# Patient Record
Sex: FEMALE | Race: WHITE | Employment: UNEMPLOYED | URBAN - METROPOLITAN AREA
[De-identification: names, ages, dates, MRNs, and addresses within clinical notes are randomized per-mention and may not be internally consistent; named-entity substitution may affect disease eponyms.]

---

## 2017-01-03 ENCOUNTER — HOSPITAL ENCOUNTER (OUTPATIENT)
Dept: PHYSICAL THERAPY | Age: 62
Discharge: HOME | End: 2017-01-03
Payer: MEDICARE

## 2017-01-03 PROCEDURE — 97035 APP MDLTY 1+ULTRASOUND EA 15: CPT

## 2017-01-03 PROCEDURE — 97110 THERAPEUTIC EXERCISES: CPT

## 2017-01-03 NOTE — PROGRESS NOTES
PT DAILY TREATMENT NOTE - [facility] 3-16    Patient Name: Charissa Miguel  Date:1/3/2017  : 1955  [x]  Patient  Verified  Payor: Rosas Scales / Plan: [place] & [place] / Product Type: Medicare /    In time:1:00  Out time:2:04  Total Treatment Time (min):53   Total Timed Codes (min): 43  1:1 Treatment Time ( [address] only): 43  Visit #: 7 of 10    Treatment Area: Other idiopathic scoliosis, site unspecified [M41.20]  Other intervertebral disc degeneration, lumbar region [M51.36]    SUBJECTIVE  Pain Level (0-10 scale): 3  Any medication changes, allergies to medications, adverse drug reactions, diagnosis change, or new procedure performed?: [x] No    [] Yes (see summary sheet for update)  Subjective functional status/changes:   [] No changes reported  Hurts  A little  Bit.     OBJECTIVE    Modality rationale: decrease edema, decrease inflammation, decrease pain and increase tissue extensibility to improve the patients ability to perform ADL   Min Type Additional Details    [] Estim:  []Unatt       []IFC  []Premod                        []Other:  []w/ice   []w/heat  Position:  Location:    [] Estim: []Att    []TENS instruct  []NMES                    []Other:  []w/US   []w/ice   []w/heat  Position:  Location:    []  Traction: [] Cervical       []Lumbar                       [] Prone          []Supine                       []Intermittent   []Continuous Lbs:  [] before manual  [] after manual    [x]  Ultrasound: [x]Continuous   [] Pulsed                8           [x]1MHz   []3MHz Location:(B) LSP  W/cm2:1.3    []  Iontophoresis with dexamethasone         Location: [] Take home patch   [] In clinic   10 []  Ice     [x]  Heat  post  []  Ice massage  []  Laser   []  Anodyne Position:prone  Location:(B) LSP    []  Laser with stim  []  Other: Position:  Location:    []  Vasopneumatic Device Pressure:       [] lo [] med [] hi   Temperature: [] lo [] med [] hi   [x] Skin assessment post-treatment:  [x]intact []redness- no adverse reaction    []redness  adverse reaction:      min []Eval                  []Re-Eval       35 min Therapeutic Exercise:  [x] See flow sheet :   Rationale: increase ROM and increase strength to improve the patients ability to perform ADL     min Therapeutic Activity:  []  See flow sheet :   Rationale:   to improve the patients ability to       min Neuromuscular Re-education:  []  See flow sheet :   Rationale:   to improve the patients ability to      min Manual Therapy:     Rationale: decrease pain, increase ROM, increase tissue extensibility and decrease edema  to perform ADL      min Gait Training:  ___ feet with ___ device on level surfaces with ___ level of assist   Rationale: With   [x] TE   [] TA   [] neuro   [] other: Patient Education: [x] Review HEP    [] Progressed/Changed HEP based on:   [] positioning   [] body mechanics   [] transfers   [] heat/ice application    [] other:      Other Objective/Functional Measures:  Completed  Each there ex  Fairly weli. Pain Level (0-10 scale) post treatment:  0    ASSESSMENT/Changes in Function: Benefited  With  Treatment  Today. Patient will continue to benefit from skilled PT services to address functional mobility deficits, address ROM deficits, address strength deficits, analyze and address soft tissue restrictions, analyze and cue movement patterns and instruct in home and community integration to attain remaining goals. [x]  See Plan of Care  []  See progress note/recertification  []  See Discharge Summary         Progress towards goals / Updated goals:  1 patient will have FOTO improved to 58 to show improved function and tolerance to ADLs  PN 47  CURRENT  41 12/23/16  2 patient will tolerate TM 1/4-1/2 mile and no increase pain to aid with increase tolerance to community ambulation for activities    PN not initiated  CURRENT . 10 miles, TM 5 minutes 12/28/16  3 patient will have tolerance to 3 way hip core strengthening exercises Blue band 10 reps for increase tolerance to ADLS    PN green 35C  CURRENT   Green 15x 1/3/17  4 Patient will have average pain 3/10 to aid with increase tolerance to ADLs and activities  PN 7  CURRENT no number \"hurts a little bit\" 1/3/17    PLAN  []  Upgrade activities as tolerated     [x]  Continue plan of care  []  Update interventions per flow sheet       []  Discharge due to:_  []  Other:_      Heidy Billings, PTA 1/3/2017  1:06 PM

## 2017-01-05 ENCOUNTER — HOSPITAL ENCOUNTER (OUTPATIENT)
Dept: PHYSICAL THERAPY | Age: 62
Discharge: HOME | End: 2017-01-05
Payer: MEDICARE

## 2017-01-05 PROCEDURE — 97110 THERAPEUTIC EXERCISES: CPT

## 2017-01-05 PROCEDURE — 97032 APPL MODALITY 1+ESTIM EA 15: CPT

## 2017-01-05 NOTE — PROGRESS NOTES
PT DAILY TREATMENT NOTE - [facility] 3-16    Patient Name: Pipe Melissa  Date:2017  : 1955  [x]  Patient  Verified  Payor: Luis Elise / Plan: [place] MEDICARE PART A & B / Product Type: Medicare /    In time:12:04  Out time:1:01  Total Treatment Time (min): 48  Total Timed Codes (min): 38  1:1 Treatment Time ( [address] only): 38  Visit #: 8 of 10    Treatment Area: Other idiopathic scoliosis, site unspecified [M41.20]  Other intervertebral disc degeneration, lumbar region [M51.36]    SUBJECTIVE  Pain Level (0-10 scale): 1  Any medication changes, allergies to medications, adverse drug reactions, diagnosis change, or new procedure performed?: [x] No    [] Yes (see summary sheet for update)  Subjective functional status/changes:   [] No changes reported  Little pain.     OBJECTIVE    Modality rationale: decrease edema, decrease inflammation, decrease pain and increase tissue extensibility to improve the patients ability to perform ADL   Min Type Additional Details    [] Estim:  []Unatt       []IFC  []Premod                        []Other:  []w/ice   []w/heat  Position:  Location:    [] Estim: []Att    []TENS instruct  []NMES                    []Other:  []w/US   []w/ice   []w/heat  Position:  Location:    []  Traction: [] Cervical       []Lumbar                       [] Prone          []Supine                       []Intermittent   []Continuous Lbs:  [] before manual  [] after manual    [x]  Ultrasound: [x]Continuous   [] Pulsed                  8         [x]1MHz   []3MHz Location:(B) LSP  W/cm2:1.3    []  Iontophoresis with dexamethasone         Location: [] Take home patch   [] In clinic   10 []  Ice     [x]  Heat post  []  Ice massage  []  Laser   []  Anodyne Position:prone  Location:(B) LSP    []  Laser with stim  []  Other: Position:  Location:    []  Vasopneumatic Device Pressure:       [] lo [] med [] hi   Temperature: [] lo [] med [] hi   [x] Skin assessment post-treatment:  [x]intact []redness- no adverse reaction    []redness  adverse reaction:      min []Eval                  []Re-Eval       30 min Therapeutic Exercise:  [x] See flow sheet :   Rationale: increase ROM and increase strength to improve the patients ability to perform ADL     min Therapeutic Activity:  []  See flow sheet :   Rationale:   to improve the patients ability to       min Neuromuscular Re-education:  []  See flow sheet :   Rationale:   to improve the patients ability to      min Manual Therapy:     Rationale:  to      min Gait Training:  ___ feet with ___ device on level surfaces with ___ level of assist   Rationale: With   [x] TE   [] TA   [] neuro   [] other: Patient Education: [x] Review HEP    [] Progressed/Changed HEP based on:   [] positioning   [] body mechanics   [] transfers   [] heat/ice application    [] other:      Other Objective/Functional Measures:  Increased  Rep per  Flow  sheet    Pain Level (0-10 scale) post treatment: 0    ASSESSMENT/Changes in Function:  Completed  Each there ex  Fairly  Well  With cues. Patient will continue to benefit from skilled PT services to address functional mobility deficits, address ROM deficits, address strength deficits, analyze and address soft tissue restrictions, analyze and cue movement patterns and instruct in home and community integration to attain remaining goals. [x]  See Plan of Care  []  See progress note/recertification  []  See Discharge Summary         Progress towards goals / Updated goals:  1 patient will have FOTO improved to 58 to show improved function and tolerance to ADLs  PN 47  CURRENT  41 12/23/16  2 patient will tolerate TM 1/4-1/2 mile and no increase pain to aid with increase tolerance to community ambulation for activities    PN not initiated  CURRENT . 10 miles, TM 5 minutes 12/28/16  3 patient will have tolerance to 3 way hip core strengthening exercises Blue band 10 reps for increase tolerance to ADLS    PN green 13G  CURRENT   Green 15x 1/3/17  4 Patient will have average pain 3/10 to aid with increase tolerance to ADLs and activities  PN 7  CURRENT no number \"hurts a little bit\" 1/3/17    PLAN  []  Upgrade activities as tolerated     [x]  Continue plan of care  []  Update interventions per flow sheet       []  Discharge due to:_  []  Other:_      Heidy Billings, ZULEYKA 1/5/2017  12:53 PM

## 2017-01-06 DIAGNOSIS — M41.20 OTHER IDIOPATHIC SCOLIOSIS: ICD-10-CM

## 2017-01-06 DIAGNOSIS — M51.36 DDD (DEGENERATIVE DISC DISEASE), LUMBAR: ICD-10-CM

## 2017-01-10 ENCOUNTER — APPOINTMENT (OUTPATIENT)
Dept: PHYSICAL THERAPY | Age: 62
End: 2017-01-10
Payer: MEDICARE

## 2017-01-12 ENCOUNTER — HOSPITAL ENCOUNTER (OUTPATIENT)
Dept: PHYSICAL THERAPY | Age: 62
Discharge: HOME | End: 2017-01-12
Payer: MEDICARE

## 2017-01-12 PROCEDURE — 97530 THERAPEUTIC ACTIVITIES: CPT

## 2017-01-12 PROCEDURE — 97110 THERAPEUTIC EXERCISES: CPT

## 2017-01-12 PROCEDURE — 97035 APP MDLTY 1+ULTRASOUND EA 15: CPT

## 2017-01-12 NOTE — PROGRESS NOTES
PT DAILY TREATMENT NOTE - [facility] 3-16    Patient Name: Bailey Celeste  Date:2017  : 1955  [x]  Patient  Verified  Payor: Annalise Vazquez / Plan: [place] MEDICARE PART A & B / Product Type: Medicare /    In time:2:00  Out time:3:12  Total Treatment Time (min): 63  Total Timed Codes (min): 53  1:1 Treatment Time ( [address] only):  53  Visit #: 9 of 10    Treatment Area:  Other idiopathic scoliosis, site unspecified [M41.20]  Other intervertebral disc degeneration, lumbar region [M51.36]    SUBJECTIVE  Pain Level (0-10 scale): 1  Any medication changes, allergies to medications, adverse drug reactions, diagnosis change, or new procedure performed?: [x] No    [] Yes (see summary sheet for update)  Subjective functional status/changes:   [] No changes reported  Little  Bit  Of  pain    OBJECTIVE    Modality rationale: decrease edema, decrease inflammation, decrease pain and increase tissue extensibility to improve the patients ability to perform ADL   Min Type Additional Details    [] Estim:  []Unatt       []IFC  []Premod                        []Other:  []w/ice   []w/heat  Position:  Location:    [] Estim: []Att    []TENS instruct  []NMES                    []Other:  []w/US   []w/ice   []w/heat  Position:  Location:    []  Traction: [] Cervical       []Lumbar                       [] Prone          []Supine                       []Intermittent   []Continuous Lbs:  [] before manual  [] after manual    [x]  Ultrasound: [x]Continuous   [] Pulsed               8            [x]1MHz   []3MHz Location:(B) LSP  W/cm2:1.3    []  Iontophoresis with dexamethasone         Location: [] Take home patch   [] In clinic   10 []  Ice     [x]  Heat  post  []  Ice massage  []  Laser   []  Anodyne Position:prone  Location:(B) LSP    []  Laser with stim  []  Other: Position:  Location:    []  Vasopneumatic Device Pressure:       [] lo [] med [] hi   Temperature: [] lo [] med [] hi   [x] Skin assessment post-treatment:  [x]intact []redness- no adverse reaction    []redness  adverse reaction:      min []Eval                  []Re-Eval       32 min Therapeutic Exercise:  [x] See flow sheet :   Rationale: increase ROM and increase strength to improve the patients ability to perform ADL    13 min Therapeutic Activity:  []  See flow sheet :   Rationale:   to improve the patients ability to      min Neuromuscular Re-education:  []  See flow sheet :   Rationale:   to improve the patients ability to      min Manual Therapy:     Rationale: decrease pain, increase ROM, increase tissue extensibility and decrease edema  to perform ADL     min Gait Training:  ___ feet with ___ device on level surfaces with ___ level of assist   Rationale: With   [x] TE   [] TA   [] neuro   [] other: Patient Education: [x] Review HEP    [] Progressed/Changed HEP based on:   [] positioning   [] body mechanics   [] transfers   [] heat/ice application    [] other:      Other Objective/Functional Measures: TM  .15  Miles intermittent  Decreased  Heel  strike     Pain Level (0-10 scale) post treatment: 0    ASSESSMENT/Changes in Function: Completed each there ex  Fairly well  With cues. Patient will continue to benefit from skilled PT services to address functional mobility deficits, address ROM deficits, address strength deficits, analyze and address soft tissue restrictions, analyze and cue movement patterns and instruct in home and community integration to attain remaining goals. [x]  See Plan of Care  []  See progress note/recertification  []  See Discharge Summary         Progress towards goals / Updated goals:  1 patient will have FOTO improved to 58 to show improved function and tolerance to ADLs  PN 47  CURRENT  41 12/23/16  2 patient will tolerate TM 1/4-1/2 mile and no increase pain to aid with increase tolerance to community ambulation for activities    PN not initiated  CURRENT . 15 miles, TM  8 minutes 1/12/17  met  3 patient will have tolerance to 3 way hip core strengthening exercises Blue band 10 reps for increase tolerance to ADLS    PN green 09G  CURRENT   Green 15x 1/3/17  4 Patient will have average pain 3/10 to aid with increase tolerance to ADLs and activities  PN 7  CURRENT no number \"hurts a little bit\" 1/3/17    PLAN  []  Upgrade activities as tolerated     [x]  Continue plan of care  []  Update interventions per flow sheet       []  Discharge due to:_  []  Other:_      Heidy Billings, ZULEYKA 1/12/2017  2:32 PM

## 2017-01-17 ENCOUNTER — HOSPITAL ENCOUNTER (OUTPATIENT)
Dept: PHYSICAL THERAPY | Age: 62
Discharge: HOME | End: 2017-01-17
Payer: MEDICARE

## 2017-01-17 PROCEDURE — 97110 THERAPEUTIC EXERCISES: CPT

## 2017-01-17 PROCEDURE — 97035 APP MDLTY 1+ULTRASOUND EA 15: CPT

## 2017-01-17 NOTE — PROGRESS NOTES
PT DAILY TREATMENT NOTE - [facility] 3-16    Patient Name: Thomas Nuñez  Date:2017  : 1955  [x]  Patient  Verified  Payor: Maritza Pierce / Plan: [place] MEDICARE PART A & B / Product Type: Medicare /    In time:1:05  Out time:2:00  Total Treatment Time (min): 44  Total Timed Codes (min): 44  1:1 Treatment Time ( [address] only): 44  Visit #: 10 of 10    Treatment Area: Other idiopathic scoliosis, site unspecified [M41.20]  Other intervertebral disc degeneration, lumbar region [M51.36]    SUBJECTIVE  Pain Level (0-10 scale): 1  Any medication changes, allergies to medications, adverse drug reactions, diagnosis change, or new procedure performed?: [x] No    [] Yes (see summary sheet for update)  Subjective functional status/changes:   [] No changes reported  Little  Pain.     OBJECTIVE    Modality rationale: decrease edema, decrease inflammation, decrease pain and increase tissue extensibility to improve the patients ability to perform ADL   Min Type Additional Details    [] Estim:  []Unatt       []IFC  []Premod                        []Other:  []w/ice   []w/heat  Position:  Location:    [] Estim: []Att    []TENS instruct  []NMES                    []Other:  []w/US   []w/ice   []w/heat  Position:  Location:    []  Traction: [] Cervical       []Lumbar                       [] Prone          []Supine                       []Intermittent   []Continuous Lbs:  [] before manual  [] after manual    [x]  Ultrasound: [x]Continuous   [] Pulsed                8           [x]1MHz   []3MHz Location:(R) LSP  W/cm2:1.3    []  Iontophoresis with dexamethasone         Location: [] Take home patch   [] In clinic    []  Ice     []  heat  []  Ice massage  []  Laser   []  Anodyne Position:  Location:    []  Laser with stim  []  Other: Position:  Location:    []  Vasopneumatic Device Pressure:       [] lo [] med [] hi   Temperature: [] lo [] med [] hi   [x] Skin assessment post-treatment:  [x]intact []redness- no adverse reaction    []redness  adverse reaction:      min []Eval                  []Re-Eval       36 min Therapeutic Exercise:  [x] See flow sheet :   Rationale: increase ROM and increase strength to improve the patients ability to perform ADL     min Therapeutic Activity:  []  See flow sheet :   Rationale:   to improve the patients ability to       min Neuromuscular Re-education:  []  See flow sheet :   Rationale:   to improve the patients ability to      min Manual Therapy:     Rationale: decrease pain, increase ROM, increase tissue extensibility and decrease edema  to perform ADL     min Gait Training:  ___ feet with ___ device on level surfaces with ___ level of assist   Rationale: With   [x] TE   [] TA   [] neuro   [] other: Patient Education: [x] Review HEP    [] Progressed/Changed HEP based on:   [] positioning   [] body mechanics   [] transfers   [] heat/ice application    [x] other: REASSESS GOALS     Other Objective/Functional Measures: FOTO:35    Pain Level (0-10 scale) post treatment: 0    ASSESSMENT/Changes in Function: Ms. Jose Holley  Reports  80%  Improvement. Ambulated  On TM . [address] no difficulty. FOTO has decreased  Slightly but  Reports  She's better. Patient will continue to benefit from skilled PT services to address functional mobility deficits, address ROM deficits, address strength deficits, analyze and address soft tissue restrictions and instruct in home and community integration to attain remaining goals. [x]  See Plan of Care  []  See progress note/recertification  []  See Discharge Summary         Progress towards goals / Updated goals:  1 patient will have FOTO improved to 58 to show improved function and tolerance to ADLs  PN 47  CURRENT 35 1/17/16  2 patient will tolerate TM 1/4-1/2 mile and no increase pain to aid with increase tolerance to community ambulation for activities    PN not initiated  CURRENT . 15 miles, TM 8 minutes 1/12/17   3 patient will have tolerance to 3 way hip core strengthening exercises Blue band 10 reps for increase tolerance to ADLS    PN green 73N  CURRENT   blue 15x 1/17/17  met  4 Patient will have average pain 3/10 to aid with increase tolerance to ADLs and activities  PN 7  CURRENT no number \"hurts a little bit\" 1/17/17 met       PLAN  []  Upgrade activities as tolerated     []  Continue plan of care  []  Update interventions per flow sheet       []  Discharge due to:_  [x]  Other:_ FAX  MD  NOTE     [address] [facility] 1/17/2017  1:07 PM

## 2017-01-18 NOTE — PROGRESS NOTES
[facility]  [address] [facility], [address] [address]  [phone] fax    Medicare Progress Report    Patient name: Charissa Miguel Start of Care: 10/3/16   Referral source: Laura Kee MD : 1955   Medical/Treatment Diagnosis: Other idiopathic scoliosis, site unspecified [M41.20]  Other intervertebral disc degeneration, lumbar region [M51.36] Onset Date:longstanding     Prior Hospitalization: see medical history Provider#: [id number]   Medications: Verified on Patient Summary List    Comorbidities:DM, Thyroid problems, Scoliosis, visual impaired  Prior Level of Function:I all areas of ADLs and activities, no AD, Disabled, enjoys reading , and watching TV     Visits from Start of Care: 17    Missed Visits: 0    Reporting Period:10/3/16 to 17    Subjective Reports: residual reports of LBP varies from right to left. Due to her cognitive issues obtaining an accurate pain scale is sometimes difficult. Current Status/ treatment goals Objective measures   1. patient will have FOTO improved to 58 to show improved function and tolerance to ADLs   [] met                 [x] not met  [] progressing 35    2. patient will tolerate TM 1/4-1/2 mile and no increase pain to aid with increase tolerance to community ambulation    [] met                 [x] not met  [] progressing Not initiated   3. patient will have tolerance to 3 way hip core strengthening exercises Blue band 10 reps for increase tolerance to ADLS     [x] met                 [] not met  [] progressing Blue band 15 repetitions   4. Patient will have average pain 3/10 to aid with increase tolerance to ADLs and activities [x] met                 [] not met  [x] progressing Latest report 1/10 with verbal reports average \"little bit\", \"sore\"     Key functional changes:no significant improvements seen via [employer].  Her score declined from previous 47 to 35                              She tolerates all exercises well. Problems/ barriers to goal attainment: residual C/O LBP that are chronic and longstanding     Assessment / Recommendations:patient appears appropriate to attempt self management at this time. She has had 17 sessions over a 3 month time frame. She tolerates sessions well. With her chronic history and our familiarity with her condition we anticipate intervention in the future and wish to remain aware of insurance cap issues. Please advise . Patient has MD follow up in February. Problem List: pain affecting function, decrease ADL/ functional abilitiies, decrease flexibility/ joint mobility and other LATEST FOTO 35   Treatment Plan: Other: attempt self management    Patient Goal (s) has been updated and includes: continue therapy     Updated Goals to be accomplished in  TBA pending MD response    Frequency / Duration: pending MD response  G-Codes (GP)  Mobility  [id number] Current  CL= 60-79%   Goal  CK= 40-59%  D/C  CL= 60-79%   The severity rating is based on clinical judgment and the FOTO score.         Emerita Felix, PT 1/18/2017 7:26 AM

## 2017-01-19 ENCOUNTER — APPOINTMENT (OUTPATIENT)
Dept: PHYSICAL THERAPY | Age: 62
End: 2017-01-19
Payer: MEDICARE

## 2017-01-25 DIAGNOSIS — M51.36 DDD (DEGENERATIVE DISC DISEASE), LUMBAR: ICD-10-CM

## 2017-01-25 DIAGNOSIS — M41.20 OTHER IDIOPATHIC SCOLIOSIS: ICD-10-CM

## 2017-01-25 RX ORDER — TRAMADOL HYDROCHLORIDE 50 MG/1
TABLET ORAL
Qty: 270 TAB | Refills: 0 | OUTPATIENT
Start: 2017-01-25 | End: 2020-01-01

## 2017-01-25 NOTE — TELEPHONE ENCOUNTER
PHONE IN RX    Last Visit: 09/28/2016 with MD Ortiz Millard    Next Appointment: 03/29/2017 with MD Ortiz Millard   Previous Refill Encounters: 09/28/2016 per MD Ortiz iMllard #[id number]     Requested Prescriptions     Pending Prescriptions Disp Refills    traMADol (ULTRAM) 50 mg tablet 270 Tab 0     Sig: Take 1/2-1 tab po daily-tid prn pain

## 2017-02-09 NOTE — PROGRESS NOTES
[facility]  [address] [facility] [address] [facility], [address] [address]  [phone] fax    Discharge Summary    Patient name: Shaista Morales     Start of Care:10/3/16  Referral source: Donna Navarrete MD    : 1955  Medical/Treatment Diagnosis: Other idiopathic scoliosis, site unspecified [M41.20]  Other intervertebral disc degeneration, lumbar region [M51.36]  Onset Date:longstanding  Prior Hospitalization: see medical history   Provider#: [id number]  Comorbidities: DM, Thyroid problems, Scoliosis, visual impaired  Prior Level of Function:I all areas of ADLs and activities, no AD, Disabled, enjoys reading , and watching TV      Medications: Verified on Patient Summary List    Visits from Start of Care: 17    Missed Visits: 0  Reporting Period : 10/3/16 to 17      Summary of Care:Please see latest MD note for specifics . Patient was to follow up with MD on 2/3/17 and there has been no further contact. She has exercises for a HEP and should continue to remain compliant with them. She should follow up with her MD as needed. Thank you.    Isabel Gregg will have established and be independent with HEP to aid with progression of skilled PT program  Status at last note/certification:eval  Status at discharge: met    Goal:patient will have FOTO improved to 49 to show improved function and tolerance to ADLs  Status at last note/certification:eval  Status at discharge: not met    Goal:patient will tolerate bike 6-8 minutes to aid with improved hip and pelvic mobility and not have any increased painStatus at last note/certification:eval  Status at discharge: met    Goal: patient will have tolerance to 3 way hip core strengthening exercises red band 10 reps for increase tolerance to ADLS   Status at last note/certification:eval  Status at discharge: met    G-Codes (GP)     NO G CODES TO REPORT AFTER NO FURTHER CONTACT FOLLOWING MD VISIT    ASSESSMENT/RECOMMENDATIONS:  []Discontinue therapy progressing towards or have reached established goals  []Discontinue therapy due to lack of appreciable progress towards goals  [x]Discontinue therapy due to lack of attendance or compliance  [x]Other:NO FURTHER CONTACT AFTER MD NOTE SENT/MD FOLLOW UP  Thank you for this referral.     Parisa Oconnell, PT 2/9/2017 12:49 PM

## 2017-04-06 ENCOUNTER — TELEPHONE (OUTPATIENT)
Dept: ORTHOPEDIC SURGERY | Age: 62
End: 2017-04-06

## 2017-06-08 ENCOUNTER — PATIENT OUTREACH (OUTPATIENT)
Dept: INTERNAL MEDICINE CLINIC | Age: 62
End: 2017-06-08

## 2017-06-08 NOTE — PROGRESS NOTES
Nurse Navigator contacted Mrs. Bao Prater, the patients mother to follow up on missed appointment with Dr. Ronny Saucedo today. Mrs. Cline Cargo for missing the appointment and said she was just about to call to reschedule. Appointment rescheduled for 6/28/2017 at 1300, per Dr. Ronny Saucedo, Mrs. Bao Prater agreed. Physical address for office provided to Mrs. Bao Prater.

## 2017-06-10 RX ORDER — LISINOPRIL 20 MG/1
TABLET ORAL
Qty: 90 TAB | Refills: 1 | Status: SHIPPED | OUTPATIENT
Start: 2017-06-10 | End: 2017-12-19 | Stop reason: SDUPTHER

## 2017-06-29 ENCOUNTER — PATIENT OUTREACH (OUTPATIENT)
Dept: INTERNAL MEDICINE CLINIC | Age: 62
End: 2017-06-29

## 2017-06-29 NOTE — PROGRESS NOTES
Nurse Navigator contacted Mrs. Dada Link, the patients mother to follow up on missed appointment with Dr. Nava oRss yesterday. Mrs. Talat Arboleda for missing the appointment and said she got lost and called to reschedule. Appointment rescheduled for 10/11/2017 at 1100. Physical address for office provided to Mrs. Dada Link.

## 2017-10-06 ENCOUNTER — TELEPHONE (OUTPATIENT)
Dept: INTERNAL MEDICINE CLINIC | Age: 62
End: 2017-10-06

## 2017-10-06 DIAGNOSIS — I10 ESSENTIAL HYPERTENSION: Primary | ICD-10-CM

## 2017-10-06 DIAGNOSIS — N18.30 TYPE 2 DIABETES MELLITUS WITH STAGE 3 CHRONIC KIDNEY DISEASE, WITHOUT LONG-TERM CURRENT USE OF INSULIN (HCC): ICD-10-CM

## 2017-10-06 DIAGNOSIS — E11.22 TYPE 2 DIABETES MELLITUS WITH STAGE 3 CHRONIC KIDNEY DISEASE, WITHOUT LONG-TERM CURRENT USE OF INSULIN (HCC): ICD-10-CM

## 2017-10-06 DIAGNOSIS — N18.30 CHRONIC KIDNEY DISEASE, STAGE III (MODERATE) (HCC): ICD-10-CM

## 2017-10-06 DIAGNOSIS — I42.0 DILATED CARDIOMYOPATHY (HCC): ICD-10-CM

## 2017-10-06 DIAGNOSIS — I48.0 PAROXYSMAL ATRIAL FIBRILLATION (HCC): ICD-10-CM

## 2017-10-06 DIAGNOSIS — E03.9 HYPOTHYROIDISM, UNSPECIFIED TYPE: ICD-10-CM

## 2017-10-06 NOTE — TELEPHONE ENCOUNTER
Please schedule lab appointment. Orders placed. Please confirm that she and her mother will come to appointments on Wednesday as has been an issue in the past.    Thanks.

## 2017-10-09 NOTE — TELEPHONE ENCOUNTER
Spoke to Mother. She didn't think they could get labs. She took down information and will give it to daughter.

## 2017-12-14 ENCOUNTER — TELEPHONE (OUTPATIENT)
Dept: INTERNAL MEDICINE CLINIC | Age: 62
End: 2017-12-14

## 2017-12-19 RX ORDER — LISINOPRIL 20 MG/1
20 TABLET ORAL DAILY
Qty: 90 TAB | Refills: 1 | Status: SHIPPED | OUTPATIENT
Start: 2017-12-19 | End: 2020-01-01

## 2017-12-19 NOTE — TELEPHONE ENCOUNTER
Last Visit: 09/06/2016 with MD Adam Whitley    Next Appointment: noted to f/u in 3 months   Previous Refill Encounters: 06/10/2017 per MD Adam Whitley #[id number] with 1 refill     Requested Prescriptions     Pending Prescriptions Disp Refills    lisinopril (PRINIVIL, ZESTRIL) 20 mg tablet 90 Tab 1     Sig: Take 1 Tab by mouth daily.

## 2018-05-29 RX ORDER — LISINOPRIL 20 MG/1
TABLET ORAL
Qty: 90 TAB | Refills: 1 | OUTPATIENT
Start: 2018-05-29

## 2018-05-30 NOTE — TELEPHONE ENCOUNTER
Called mother. Says patient has not been to doctor. Mother has been sick and hasn't been able to take her. She said if we schedule appt she will try and get her here to at least refill the medication.  Appt scheduled with Pasquale Salinas
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
## 2020-01-01 ENCOUNTER — APPOINTMENT (OUTPATIENT)
Dept: CT IMAGING | Age: 65
DRG: 291 | End: 2020-01-01
Attending: EMERGENCY MEDICINE
Payer: MEDICARE

## 2020-01-01 ENCOUNTER — APPOINTMENT (OUTPATIENT)
Dept: GENERAL RADIOLOGY | Age: 65
DRG: 208 | End: 2020-01-01
Attending: STUDENT IN AN ORGANIZED HEALTH CARE EDUCATION/TRAINING PROGRAM
Payer: MEDICARE

## 2020-01-01 ENCOUNTER — HOSPITAL ENCOUNTER (OUTPATIENT)
Dept: CT IMAGING | Age: 65
Discharge: HOME | DRG: 208 | End: 2020-10-20
Attending: EMERGENCY MEDICINE
Payer: MEDICARE

## 2020-01-01 ENCOUNTER — HOSPITAL ENCOUNTER (OUTPATIENT)
Dept: LAB | Age: 65
Discharge: HOME | End: 2020-09-29

## 2020-01-01 ENCOUNTER — APPOINTMENT (OUTPATIENT)
Dept: GENERAL RADIOLOGY | Age: 65
DRG: 208 | End: 2020-01-01
Attending: EMERGENCY MEDICINE
Payer: MEDICARE

## 2020-01-01 ENCOUNTER — OFFICE VISIT (OUTPATIENT)
Dept: CARDIOLOGY CLINIC | Age: 65
End: 2020-01-01

## 2020-01-01 ENCOUNTER — APPOINTMENT (OUTPATIENT)
Dept: GENERAL RADIOLOGY | Age: 65
End: 2020-01-01
Attending: EMERGENCY MEDICINE
Payer: MEDICARE

## 2020-01-01 ENCOUNTER — APPOINTMENT (OUTPATIENT)
Dept: VASCULAR SURGERY | Age: 65
DRG: 291 | End: 2020-01-01
Attending: EMERGENCY MEDICINE
Payer: MEDICARE

## 2020-01-01 ENCOUNTER — HOSPITAL ENCOUNTER (INPATIENT)
Age: 65
LOS: 4 days | Discharge: NURSING, REHAB OR LONG-TERM CARE | DRG: 291 | End: 2020-08-29
Attending: EMERGENCY MEDICINE | Admitting: EMERGENCY MEDICINE
Payer: MEDICARE

## 2020-01-01 ENCOUNTER — APPOINTMENT (OUTPATIENT)
Dept: GENERAL RADIOLOGY | Age: 65
DRG: 308 | End: 2020-01-01
Attending: EMERGENCY MEDICINE
Payer: MEDICARE

## 2020-01-01 ENCOUNTER — HOSPITAL ENCOUNTER (INPATIENT)
Age: 65
LOS: 2 days | DRG: 208 | End: 2020-10-22
Attending: EMERGENCY MEDICINE | Admitting: INTERNAL MEDICINE
Payer: MEDICARE

## 2020-01-01 ENCOUNTER — HOSPITAL ENCOUNTER (EMERGENCY)
Age: 65
Discharge: HOME | End: 2020-10-07
Attending: EMERGENCY MEDICINE | Admitting: EMERGENCY MEDICINE
Payer: MEDICARE

## 2020-01-01 ENCOUNTER — PATIENT OUTREACH (OUTPATIENT)
Dept: CASE MANAGEMENT | Age: 65
End: 2020-01-01

## 2020-01-01 ENCOUNTER — HOSPITAL ENCOUNTER (OUTPATIENT)
Dept: LAB | Age: 65
Discharge: HOME | End: 2020-08-31

## 2020-01-01 ENCOUNTER — ANESTHESIA (OUTPATIENT)
Dept: EMERGENCY DEPT | Age: 65
DRG: 291 | End: 2020-01-01
Payer: MEDICARE

## 2020-01-01 ENCOUNTER — APPOINTMENT (OUTPATIENT)
Dept: ULTRASOUND IMAGING | Age: 65
DRG: 208 | End: 2020-01-01
Attending: STUDENT IN AN ORGANIZED HEALTH CARE EDUCATION/TRAINING PROGRAM
Payer: MEDICARE

## 2020-01-01 ENCOUNTER — APPOINTMENT (OUTPATIENT)
Dept: CT IMAGING | Age: 65
DRG: 308 | End: 2020-01-01
Attending: PSYCHIATRY & NEUROLOGY
Payer: MEDICARE

## 2020-01-01 ENCOUNTER — APPOINTMENT (OUTPATIENT)
Dept: VASCULAR SURGERY | Age: 65
DRG: 308 | End: 2020-01-01
Attending: INTERNAL MEDICINE
Payer: MEDICARE

## 2020-01-01 ENCOUNTER — ANESTHESIA EVENT (OUTPATIENT)
Dept: EMERGENCY DEPT | Age: 65
DRG: 291 | End: 2020-01-01
Payer: MEDICARE

## 2020-01-01 ENCOUNTER — APPOINTMENT (OUTPATIENT)
Dept: CT IMAGING | Age: 65
DRG: 308 | End: 2020-01-01
Attending: EMERGENCY MEDICINE
Payer: MEDICARE

## 2020-01-01 ENCOUNTER — APPOINTMENT (OUTPATIENT)
Dept: NON INVASIVE DIAGNOSTICS | Age: 65
DRG: 308 | End: 2020-01-01
Attending: INTERNAL MEDICINE
Payer: MEDICARE

## 2020-01-01 ENCOUNTER — APPOINTMENT (OUTPATIENT)
Dept: NON INVASIVE DIAGNOSTICS | Age: 65
DRG: 208 | End: 2020-01-01
Attending: NURSE PRACTITIONER
Payer: MEDICARE

## 2020-01-01 ENCOUNTER — APPOINTMENT (OUTPATIENT)
Dept: GENERAL RADIOLOGY | Age: 65
DRG: 291 | End: 2020-01-01
Attending: EMERGENCY MEDICINE
Payer: MEDICARE

## 2020-01-01 ENCOUNTER — HOSPITAL ENCOUNTER (INPATIENT)
Age: 65
LOS: 12 days | Discharge: NURSING, REHAB OR LONG-TERM CARE | DRG: 308 | End: 2020-07-20
Attending: EMERGENCY MEDICINE | Admitting: INTERNAL MEDICINE
Payer: MEDICARE

## 2020-01-01 VITALS
DIASTOLIC BLOOD PRESSURE: 56 MMHG | WEIGHT: 130.73 LBS | OXYGEN SATURATION: 94 % | HEIGHT: 60 IN | HEART RATE: 94 BPM | SYSTOLIC BLOOD PRESSURE: 112 MMHG | TEMPERATURE: 98.2 F | RESPIRATION RATE: 20 BRPM | BODY MASS INDEX: 25.67 KG/M2

## 2020-01-01 VITALS
BODY MASS INDEX: 25.09 KG/M2 | RESPIRATION RATE: 20 BRPM | HEART RATE: 78 BPM | SYSTOLIC BLOOD PRESSURE: 137 MMHG | DIASTOLIC BLOOD PRESSURE: 82 MMHG | TEMPERATURE: 97.4 F | OXYGEN SATURATION: 96 % | HEIGHT: 63 IN | WEIGHT: 141.6 LBS

## 2020-01-01 VITALS
HEART RATE: 142 BPM | DIASTOLIC BLOOD PRESSURE: 99 MMHG | SYSTOLIC BLOOD PRESSURE: 114 MMHG | OXYGEN SATURATION: 100 % | BODY MASS INDEX: 24.99 KG/M2 | WEIGHT: 150 LBS | HEIGHT: 65 IN | RESPIRATION RATE: 18 BRPM | TEMPERATURE: 97.8 F

## 2020-01-01 VITALS — HEART RATE: 92 BPM | OXYGEN SATURATION: 92 % | DIASTOLIC BLOOD PRESSURE: 85 MMHG | SYSTOLIC BLOOD PRESSURE: 130 MMHG

## 2020-01-01 VITALS
RESPIRATION RATE: 19 BRPM | OXYGEN SATURATION: 97 % | SYSTOLIC BLOOD PRESSURE: 121 MMHG | TEMPERATURE: 97.8 F | HEART RATE: 93 BPM | DIASTOLIC BLOOD PRESSURE: 79 MMHG

## 2020-01-01 DIAGNOSIS — I50.43 ACUTE ON CHRONIC COMBINED SYSTOLIC AND DIASTOLIC ACC/AHA STAGE C CONGESTIVE HEART FAILURE (HCC): ICD-10-CM

## 2020-01-01 DIAGNOSIS — N17.9 AKI (ACUTE KIDNEY INJURY) (HCC): ICD-10-CM

## 2020-01-01 DIAGNOSIS — I50.23 ACUTE ON CHRONIC SYSTOLIC CONGESTIVE HEART FAILURE (HCC): Primary | ICD-10-CM

## 2020-01-01 DIAGNOSIS — J96.01 ACUTE RESPIRATORY FAILURE WITH HYPOXIA (HCC): ICD-10-CM

## 2020-01-01 DIAGNOSIS — I63.9 CEREBROVASCULAR ACCIDENT (CVA), UNSPECIFIED MECHANISM (HCC): ICD-10-CM

## 2020-01-01 DIAGNOSIS — I50.9 ACUTE ON CHRONIC CONGESTIVE HEART FAILURE, UNSPECIFIED HEART FAILURE TYPE (HCC): ICD-10-CM

## 2020-01-01 DIAGNOSIS — J18.9 COMMUNITY ACQUIRED PNEUMONIA, UNSPECIFIED LATERALITY: Primary | ICD-10-CM

## 2020-01-01 DIAGNOSIS — I50.82 BIVENTRICULAR CONGESTIVE HEART FAILURE (HCC): ICD-10-CM

## 2020-01-01 DIAGNOSIS — F79 INTELLECTUAL DISABILITY: ICD-10-CM

## 2020-01-01 DIAGNOSIS — Z71.89 GOALS OF CARE, COUNSELING/DISCUSSION: ICD-10-CM

## 2020-01-01 DIAGNOSIS — H10.33 ACUTE BACTERIAL CONJUNCTIVITIS OF BOTH EYES: ICD-10-CM

## 2020-01-01 DIAGNOSIS — I48.0 PAROXYSMAL ATRIAL FIBRILLATION (HCC): ICD-10-CM
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
## 2020-01-01 DIAGNOSIS — I42.0 DILATED CARDIOMYOPATHY (HCC): ICD-10-CM

## 2020-01-01 DIAGNOSIS — R53.81 DEBILITY: ICD-10-CM

## 2020-01-01 DIAGNOSIS — I48.0 PAROXYSMAL ATRIAL FIBRILLATION WITH RVR (HCC): Primary | ICD-10-CM

## 2020-01-01 DIAGNOSIS — I10 ESSENTIAL HYPERTENSION: ICD-10-CM

## 2020-01-01 DIAGNOSIS — N18.30 CHRONIC KIDNEY DISEASE, STAGE III (MODERATE) (HCC): ICD-10-CM

## 2020-01-01 DIAGNOSIS — I48.0 PAROXYSMAL ATRIAL FIBRILLATION (HCC): Primary | ICD-10-CM

## 2020-01-01 LAB
25(OH)D3 SERPL-MCNC: 22.7 NG/ML (ref 30–100)
ALBUMIN SERPL-MCNC: 2.1 G/DL (ref 3.4–5)
ALBUMIN SERPL-MCNC: 2.3 G/DL (ref 3.4–5)
ALBUMIN SERPL-MCNC: 2.5 G/DL (ref 3.4–5)
ALBUMIN SERPL-MCNC: 2.5 G/DL (ref 3.4–5)
ALBUMIN SERPL-MCNC: 2.6 G/DL (ref 3.4–5)
ALBUMIN SERPL-MCNC: 2.8 G/DL (ref 3.4–5)
ALBUMIN/GLOB SERPL: 0.7 {RATIO} (ref 0.8–1.7)
ALBUMIN/GLOB SERPL: 0.7 {RATIO} (ref 0.8–1.7)
ALBUMIN/GLOB SERPL: 0.8 {RATIO} (ref 0.8–1.7)
ALP SERPL-CCNC: 115 U/L (ref 45–117)
ALP SERPL-CCNC: 138 U/L (ref 45–117)
ALP SERPL-CCNC: 148 U/L (ref 45–117)
ALP SERPL-CCNC: 172 U/L (ref 45–117)
ALP SERPL-CCNC: 223 U/L (ref 45–117)
ALP SERPL-CCNC: 90 U/L (ref 45–117)
ALT SERPL-CCNC: 112 U/L (ref 13–56)
ALT SERPL-CCNC: 149 U/L (ref 13–56)
ALT SERPL-CCNC: 208 U/L (ref 13–56)
ALT SERPL-CCNC: 38 U/L (ref 13–56)
ALT SERPL-CCNC: 42 U/L (ref 13–56)
ALT SERPL-CCNC: 52 U/L (ref 13–56)
AMMONIA PLAS-SCNC: 60 UMOL/L (ref 11–32)
ANION GAP SERPL CALC-SCNC: 10 MMOL/L (ref 3–18)
ANION GAP SERPL CALC-SCNC: 11 MMOL/L (ref 3–18)
ANION GAP SERPL CALC-SCNC: 3 MMOL/L (ref 3–18)
ANION GAP SERPL CALC-SCNC: 3 MMOL/L (ref 3–18)
ANION GAP SERPL CALC-SCNC: 4 MMOL/L (ref 3–18)
ANION GAP SERPL CALC-SCNC: 5 MMOL/L (ref 3–18)
ANION GAP SERPL CALC-SCNC: 6 MMOL/L (ref 3–18)
ANION GAP SERPL CALC-SCNC: 6 MMOL/L (ref 3–18)
ANION GAP SERPL CALC-SCNC: 7 MMOL/L (ref 3–18)
ANION GAP SERPL CALC-SCNC: 7 MMOL/L (ref 3–18)
ANION GAP SERPL CALC-SCNC: 8 MMOL/L (ref 3–18)
ANION GAP SERPL CALC-SCNC: 9 MMOL/L (ref 3–18)
APAP SERPL-MCNC: 7 UG/ML (ref 10–30)
APPEARANCE UR: CLEAR
APPEARANCE UR: CLEAR
APTT PPP: 146.4 SEC (ref 23–36.4)
APTT PPP: 170.7 SEC (ref 23–36.4)
APTT PPP: 29.1 SEC (ref 23–36.4)
APTT PPP: 31 SEC (ref 23–36.4)
APTT PPP: 31.8 SEC (ref 23–36.4)
APTT PPP: 32.7 SEC (ref 23–36.4)
APTT PPP: 76.7 SEC (ref 23–36.4)
APTT PPP: 80.2 SEC (ref 23–36.4)
APTT PPP: 82 SEC (ref 23–36.4)
APTT PPP: 88.4 SEC (ref 23–36.4)
APTT PPP: >180 SEC (ref 23–36.4)
ARTERIAL PATENCY WRIST A: ABNORMAL
ARTERIAL PATENCY WRIST A: NO
ARTERIAL PATENCY WRIST A: NO
ARTERIAL PATENCY WRIST A: YES
ARTERIAL PATENCY WRIST A: YES
AST SERPL-CCNC: 110 U/L (ref 10–38)
AST SERPL-CCNC: 22 U/L (ref 10–38)
AST SERPL-CCNC: 263 U/L (ref 10–38)
AST SERPL-CCNC: 35 U/L (ref 10–38)
AST SERPL-CCNC: 49 U/L (ref 10–38)
AST SERPL-CCNC: 55 U/L (ref 10–38)
ATRIAL RATE: 104 BPM
ATRIAL RATE: 144 BPM
ATRIAL RATE: 147 BPM
ATRIAL RATE: 187 BPM
ATRIAL RATE: 258 BPM
ATRIAL RATE: 81 BPM
BACTERIA SPEC CULT: NORMAL
BACTERIA SPEC CULT: NORMAL
BACTERIA URNS QL MICRO: ABNORMAL /HPF
BASE DEFICIT BLD-SCNC: 2 MMOL/L
BASE EXCESS BLD CALC-SCNC: 2 MMOL/L
BASE EXCESS BLD CALC-SCNC: 4 MMOL/L
BASE EXCESS BLD CALC-SCNC: 6 MMOL/L
BASE EXCESS BLD CALC-SCNC: 7 MMOL/L
BASOPHILS # BLD: 0 K/UL (ref 0–0.1)
BASOPHILS NFR BLD: 0 % (ref 0–2)
BDY SITE: ABNORMAL
BILIRUB SERPL-MCNC: 0.3 MG/DL (ref 0.2–1)
BILIRUB SERPL-MCNC: 0.3 MG/DL (ref 0.2–1)
BILIRUB SERPL-MCNC: 0.6 MG/DL (ref 0.2–1)
BILIRUB SERPL-MCNC: 0.6 MG/DL (ref 0.2–1)
BILIRUB SERPL-MCNC: 0.7 MG/DL (ref 0.2–1)
BILIRUB SERPL-MCNC: 0.8 MG/DL (ref 0.2–1)
BILIRUB UR QL: NEGATIVE
BILIRUB UR QL: NEGATIVE
BNP SERPL-MCNC: ABNORMAL PG/ML (ref 0–900)
BODY TEMPERATURE: 36.6
BODY TEMPERATURE: 36.9
BODY TEMPERATURE: 37
BUN SERPL-MCNC: 27 MG/DL (ref 7–18)
BUN SERPL-MCNC: 28 MG/DL (ref 7–18)
BUN SERPL-MCNC: 29 MG/DL (ref 7–18)
BUN SERPL-MCNC: 29 MG/DL (ref 7–18)
BUN SERPL-MCNC: 31 MG/DL (ref 7–18)
BUN SERPL-MCNC: 35 MG/DL (ref 7–18)
BUN SERPL-MCNC: 39 MG/DL (ref 7–18)
BUN SERPL-MCNC: 41 MG/DL (ref 7–18)
BUN SERPL-MCNC: 46 MG/DL (ref 7–18)
BUN SERPL-MCNC: 47 MG/DL (ref 7–18)
BUN SERPL-MCNC: 53 MG/DL (ref 7–18)
BUN SERPL-MCNC: 53 MG/DL (ref 7–18)
BUN SERPL-MCNC: 54 MG/DL (ref 7–18)
BUN SERPL-MCNC: 59 MG/DL (ref 7–18)
BUN SERPL-MCNC: 66 MG/DL (ref 7–18)
BUN SERPL-MCNC: 78 MG/DL (ref 7–18)
BUN SERPL-MCNC: 80 MG/DL (ref 7–18)
BUN/CREAT SERPL: 21 (ref 12–20)
BUN/CREAT SERPL: 22 (ref 12–20)
BUN/CREAT SERPL: 25 (ref 12–20)
BUN/CREAT SERPL: 26 (ref 12–20)
BUN/CREAT SERPL: 28 (ref 12–20)
BUN/CREAT SERPL: 30 (ref 12–20)
BUN/CREAT SERPL: 31 (ref 12–20)
BUN/CREAT SERPL: 33 (ref 12–20)
BUN/CREAT SERPL: 36 (ref 12–20)
BUN/CREAT SERPL: 38 (ref 12–20)
BUN/CREAT SERPL: 40 (ref 12–20)
BUN/CREAT SERPL: 43 (ref 12–20)
C TRACH RRNA SPEC QL NAA+PROBE: NEGATIVE
CALCIUM SERPL-MCNC: 7.2 MG/DL (ref 8.5–10.1)
CALCIUM SERPL-MCNC: 8 MG/DL (ref 8.5–10.1)
CALCIUM SERPL-MCNC: 8.2 MG/DL (ref 8.5–10.1)
CALCIUM SERPL-MCNC: 8.2 MG/DL (ref 8.5–10.1)
CALCIUM SERPL-MCNC: 8.5 MG/DL (ref 8.5–10.1)
CALCIUM SERPL-MCNC: 8.5 MG/DL (ref 8.5–10.1)
CALCIUM SERPL-MCNC: 8.7 MG/DL (ref 8.5–10.1)
CALCIUM SERPL-MCNC: 8.8 MG/DL (ref 8.5–10.1)
CALCIUM SERPL-MCNC: 8.9 MG/DL (ref 8.5–10.1)
CALCIUM SERPL-MCNC: 8.9 MG/DL (ref 8.5–10.1)
CALCIUM SERPL-MCNC: 9 MG/DL (ref 8.5–10.1)
CALCIUM SERPL-MCNC: 9 MG/DL (ref 8.5–10.1)
CALCIUM SERPL-MCNC: 9.3 MG/DL (ref 8.5–10.1)
CALCIUM SERPL-MCNC: 9.4 MG/DL (ref 8.5–10.1)
CALCULATED R AXIS, ECG10: -20 DEGREES
CALCULATED R AXIS, ECG10: -21 DEGREES
CALCULATED R AXIS, ECG10: -26 DEGREES
CALCULATED R AXIS, ECG10: -49 DEGREES
CALCULATED R AXIS, ECG10: -76 DEGREES
CALCULATED R AXIS, ECG10: -8 DEGREES
CALCULATED T AXIS, ECG11: -100 DEGREES
CALCULATED T AXIS, ECG11: -102 DEGREES
CALCULATED T AXIS, ECG11: -125 DEGREES
CALCULATED T AXIS, ECG11: -126 DEGREES
CALCULATED T AXIS, ECG11: -129 DEGREES
CALCULATED T AXIS, ECG11: 101 DEGREES
CHLORIDE SERPL-SCNC: 102 MMOL/L (ref 100–111)
CHLORIDE SERPL-SCNC: 103 MMOL/L (ref 100–111)
CHLORIDE SERPL-SCNC: 104 MMOL/L (ref 100–111)
CHLORIDE SERPL-SCNC: 105 MMOL/L (ref 100–111)
CHLORIDE SERPL-SCNC: 106 MMOL/L (ref 100–111)
CHLORIDE SERPL-SCNC: 107 MMOL/L (ref 100–111)
CHLORIDE SERPL-SCNC: 107 MMOL/L (ref 100–111)
CHLORIDE SERPL-SCNC: 109 MMOL/L (ref 100–111)
CHLORIDE SERPL-SCNC: 110 MMOL/L (ref 100–111)
CHLORIDE SERPL-SCNC: 110 MMOL/L (ref 100–111)
CHLORIDE SERPL-SCNC: 112 MMOL/L (ref 100–111)
CHLORIDE SERPL-SCNC: 113 MMOL/L (ref 100–111)
CHLORIDE SERPL-SCNC: 114 MMOL/L (ref 100–111)
CHLORIDE SERPL-SCNC: 115 MMOL/L (ref 100–111)
CHOLEST SERPL-MCNC: 145 MG/DL
CK MB CFR SERPL CALC: 3.3 % (ref 0–4)
CK MB CFR SERPL CALC: 4.3 % (ref 0–4)
CK MB CFR SERPL CALC: 4.4 % (ref 0–4)
CK MB CFR SERPL CALC: 5.2 % (ref 0–4)
CK MB CFR SERPL CALC: 5.5 % (ref 0–4)
CK MB CFR SERPL CALC: 6.3 % (ref 0–4)
CK MB CFR SERPL CALC: 6.7 % (ref 0–4)
CK MB CFR SERPL CALC: ABNORMAL % (ref 0–4)
CK MB SERPL-MCNC: 1.5 NG/ML (ref 5–25)
CK MB SERPL-MCNC: 1.7 NG/ML (ref 5–25)
CK MB SERPL-MCNC: 1.8 NG/ML (ref 5–25)
CK MB SERPL-MCNC: 17.9 NG/ML (ref 5–25)
CK MB SERPL-MCNC: 2.3 NG/ML (ref 5–25)
CK MB SERPL-MCNC: 2.7 NG/ML (ref 5–25)
CK MB SERPL-MCNC: 2.9 NG/ML (ref 5–25)
CK MB SERPL-MCNC: 3 NG/ML (ref 5–25)
CK SERPL-CCNC: 29 U/L (ref 26–192)
CK SERPL-CCNC: 41 U/L (ref 26–192)
CK SERPL-CCNC: 42 U/L (ref 26–192)
CK SERPL-CCNC: 43 U/L (ref 26–192)
CK SERPL-CCNC: 43 U/L (ref 26–192)
CK SERPL-CCNC: 52 U/L (ref 26–192)
CK SERPL-CCNC: 566 U/L (ref 26–192)
CK SERPL-CCNC: 70 U/L (ref 26–192)
CK SERPL-CCNC: <7 U/L (ref 26–192)
CO2 SERPL-SCNC: 21 MMOL/L (ref 21–32)
CO2 SERPL-SCNC: 24 MMOL/L (ref 21–32)
CO2 SERPL-SCNC: 25 MMOL/L (ref 21–32)
CO2 SERPL-SCNC: 26 MMOL/L (ref 21–32)
CO2 SERPL-SCNC: 26 MMOL/L (ref 21–32)
CO2 SERPL-SCNC: 28 MMOL/L (ref 21–32)
CO2 SERPL-SCNC: 29 MMOL/L (ref 21–32)
CO2 SERPL-SCNC: 29 MMOL/L (ref 21–32)
CO2 SERPL-SCNC: 30 MMOL/L (ref 21–32)
CO2 SERPL-SCNC: 31 MMOL/L (ref 21–32)
CO2 SERPL-SCNC: 31 MMOL/L (ref 21–32)
CO2 SERPL-SCNC: 33 MMOL/L (ref 21–32)
CO2 SERPL-SCNC: 34 MMOL/L (ref 21–32)
CO2 SERPL-SCNC: 35 MMOL/L (ref 21–32)
COHGB MFR BLD: 2.8 % (ref 0–3.6)
COLOR UR: YELLOW
COLOR UR: YELLOW
CREAT SERPL-MCNC: 1.04 MG/DL (ref 0.6–1.3)
CREAT SERPL-MCNC: 1.3 MG/DL (ref 0.6–1.3)
CREAT SERPL-MCNC: 1.31 MG/DL (ref 0.6–1.3)
CREAT SERPL-MCNC: 1.31 MG/DL (ref 0.6–1.3)
CREAT SERPL-MCNC: 1.33 MG/DL (ref 0.6–1.3)
CREAT SERPL-MCNC: 1.36 MG/DL (ref 0.6–1.3)
CREAT SERPL-MCNC: 1.44 MG/DL (ref 0.6–1.3)
CREAT SERPL-MCNC: 1.53 MG/DL (ref 0.6–1.3)
CREAT SERPL-MCNC: 1.58 MG/DL (ref 0.6–1.3)
CREAT SERPL-MCNC: 1.6 MG/DL (ref 0.6–1.3)
CREAT SERPL-MCNC: 1.63 MG/DL (ref 0.6–1.3)
CREAT SERPL-MCNC: 1.72 MG/DL (ref 0.6–1.3)
CREAT SERPL-MCNC: 1.76 MG/DL (ref 0.6–1.3)
CREAT SERPL-MCNC: 1.81 MG/DL (ref 0.6–1.3)
CREAT SERPL-MCNC: 1.88 MG/DL (ref 0.6–1.3)
CREAT SERPL-MCNC: 1.94 MG/DL (ref 0.6–1.3)
CREAT SERPL-MCNC: 1.94 MG/DL (ref 0.6–1.3)
DIAGNOSIS, 93000: NORMAL
DIFFERENTIAL METHOD BLD: ABNORMAL
ECHO AO ROOT DIAM: 2.97 CM
ECHO LA AREA 4C: 20.9 CM2
ECHO LA VOL 2C: 80.05 ML (ref 22–52)
ECHO LA VOL 4C: 56.88 ML (ref 22–52)
ECHO LA VOL BP: 73.77 ML (ref 22–52)
ECHO LA VOL/BSA BIPLANE: 51.15 ML/M2 (ref 16–28)
ECHO LA VOLUME INDEX A2C: 55.51 ML/M2 (ref 16–28)
ECHO LA VOLUME INDEX A4C: 39.44 ML/M2 (ref 16–28)
ECHO LV EDV TEICHHOLZ: 0.75 ML
ECHO LV ESV TEICHHOLZ: 0.56 ML
ECHO LV INTERNAL DIMENSION DIASTOLIC: 4.54 CM (ref 3.9–5.3)
ECHO LV INTERNAL DIMENSION DIASTOLIC: 4.93 CM (ref 3.9–5.3)
ECHO LV INTERNAL DIMENSION SYSTOLIC: 4.35 CM
ECHO LV INTERNAL DIMENSION SYSTOLIC: 4.39 CM
ECHO LV IVSD: 1.2 CM (ref 0.6–0.9)
ECHO LV IVSD: 1.53 CM (ref 0.6–0.9)
ECHO LV MASS 2D: 224.6 G (ref 67–162)
ECHO LV MASS 2D: 237.2 G (ref 67–162)
ECHO LV MASS INDEX 2D: 135.5 G/M2 (ref 43–95)
ECHO LV MASS INDEX 2D: 155.7 G/M2 (ref 43–95)
ECHO LV POSTERIOR WALL DIASTOLIC: 1.16 CM (ref 0.6–0.9)
ECHO LV POSTERIOR WALL DIASTOLIC: 1.17 CM (ref 0.6–0.9)
ECHO LVOT DIAM: 1.89 CM
ECHO LVOT PEAK GRADIENT: 2.2 MMHG
ECHO LVOT PEAK VELOCITY: 73.91 CM/S
ECHO LVOT SV: 35.7 ML
ECHO LVOT VTI: 12.73 CM
ECHO TV REGURGITANT MAX VELOCITY: 277.11 CM/S
ECHO TV REGURGITANT PEAK GRADIENT: 30.7 MMHG
EOSINOPHIL # BLD: 0 K/UL (ref 0–0.4)
EOSINOPHIL # BLD: 0 K/UL (ref 0–0.4)
EOSINOPHIL # BLD: 0.1 K/UL (ref 0–0.4)
EOSINOPHIL # BLD: 0.2 K/UL (ref 0–0.4)
EOSINOPHIL # BLD: 0.2 K/UL (ref 0–0.4)
EOSINOPHIL NFR BLD: 0 % (ref 0–5)
EOSINOPHIL NFR BLD: 0 % (ref 0–5)
EOSINOPHIL NFR BLD: 1 % (ref 0–5)
EOSINOPHIL NFR BLD: 2 % (ref 0–5)
EOSINOPHIL NFR BLD: 2 % (ref 0–5)
EOSINOPHIL NFR BLD: 3 % (ref 0–5)
EPITH CASTS URNS QL MICRO: ABNORMAL /LPF (ref 0–5)
ERYTHROCYTE [DISTWIDTH] IN BLOOD BY AUTOMATED COUNT: 14.1 % (ref 11.6–14.5)
ERYTHROCYTE [DISTWIDTH] IN BLOOD BY AUTOMATED COUNT: 14.1 % (ref 11.6–14.5)
ERYTHROCYTE [DISTWIDTH] IN BLOOD BY AUTOMATED COUNT: 14.2 % (ref 11.6–14.5)
ERYTHROCYTE [DISTWIDTH] IN BLOOD BY AUTOMATED COUNT: 14.3 % (ref 11.6–14.5)
ERYTHROCYTE [DISTWIDTH] IN BLOOD BY AUTOMATED COUNT: 14.3 % (ref 11.6–14.5)
ERYTHROCYTE [DISTWIDTH] IN BLOOD BY AUTOMATED COUNT: 14.5 % (ref 11.6–14.5)
ERYTHROCYTE [DISTWIDTH] IN BLOOD BY AUTOMATED COUNT: 15.6 % (ref 11.6–14.5)
ERYTHROCYTE [DISTWIDTH] IN BLOOD BY AUTOMATED COUNT: 15.7 % (ref 11.6–14.5)
ERYTHROCYTE [DISTWIDTH] IN BLOOD BY AUTOMATED COUNT: 15.8 % (ref 11.6–14.5)
ERYTHROCYTE [DISTWIDTH] IN BLOOD BY AUTOMATED COUNT: 15.9 % (ref 11.6–14.5)
ERYTHROCYTE [DISTWIDTH] IN BLOOD BY AUTOMATED COUNT: 16.2 % (ref 11.6–14.5)
EST. AVERAGE GLUCOSE BLD GHB EST-MCNC: 177 MG/DL
EST. AVERAGE GLUCOSE BLD GHB EST-MCNC: 200 MG/DL
FOLATE SERPL-MCNC: >20 NG/ML (ref 3.1–17.5)
GAS FLOW.O2 O2 DELIVERY SYS: ABNORMAL L/MIN
GAS FLOW.O2 SETTING OXYMISER: 12 BPM
GAS FLOW.O2 SETTING OXYMISER: 12 BPM
GAS FLOW.O2 SETTING OXYMISER: 15 BPM
GAS FLOW.O2 SETTING OXYMISER: 17 BPM
GAS FLOW.O2 SETTING OXYMISER: 3 L/M
GGT SERPL-CCNC: 395 U/L (ref 5–55)
GLOBULIN SER CALC-MCNC: 2.7 G/DL (ref 2–4)
GLOBULIN SER CALC-MCNC: 3 G/DL (ref 2–4)
GLOBULIN SER CALC-MCNC: 3.2 G/DL (ref 2–4)
GLOBULIN SER CALC-MCNC: 3.3 G/DL (ref 2–4)
GLOBULIN SER CALC-MCNC: 3.7 G/DL (ref 2–4)
GLOBULIN SER CALC-MCNC: 4 G/DL (ref 2–4)
GLUCOSE BLD STRIP.AUTO-MCNC: 100 MG/DL (ref 70–110)
GLUCOSE BLD STRIP.AUTO-MCNC: 102 MG/DL (ref 70–110)
GLUCOSE BLD STRIP.AUTO-MCNC: 104 MG/DL (ref 70–110)
GLUCOSE BLD STRIP.AUTO-MCNC: 106 MG/DL (ref 70–110)
GLUCOSE BLD STRIP.AUTO-MCNC: 108 MG/DL (ref 70–110)
GLUCOSE BLD STRIP.AUTO-MCNC: 108 MG/DL (ref 70–110)
GLUCOSE BLD STRIP.AUTO-MCNC: 110 MG/DL (ref 70–110)
GLUCOSE BLD STRIP.AUTO-MCNC: 110 MG/DL (ref 70–110)
GLUCOSE BLD STRIP.AUTO-MCNC: 115 MG/DL (ref 70–110)
GLUCOSE BLD STRIP.AUTO-MCNC: 116 MG/DL (ref 70–110)
GLUCOSE BLD STRIP.AUTO-MCNC: 118 MG/DL (ref 70–110)
GLUCOSE BLD STRIP.AUTO-MCNC: 121 MG/DL (ref 70–110)
GLUCOSE BLD STRIP.AUTO-MCNC: 121 MG/DL (ref 70–110)
GLUCOSE BLD STRIP.AUTO-MCNC: 124 MG/DL (ref 70–110)
GLUCOSE BLD STRIP.AUTO-MCNC: 126 MG/DL (ref 70–110)
GLUCOSE BLD STRIP.AUTO-MCNC: 128 MG/DL (ref 70–110)
GLUCOSE BLD STRIP.AUTO-MCNC: 132 MG/DL (ref 70–110)
GLUCOSE BLD STRIP.AUTO-MCNC: 135 MG/DL (ref 70–110)
GLUCOSE BLD STRIP.AUTO-MCNC: 136 MG/DL (ref 70–110)
GLUCOSE BLD STRIP.AUTO-MCNC: 137 MG/DL (ref 70–110)
GLUCOSE BLD STRIP.AUTO-MCNC: 140 MG/DL (ref 70–110)
GLUCOSE BLD STRIP.AUTO-MCNC: 141 MG/DL (ref 70–110)
GLUCOSE BLD STRIP.AUTO-MCNC: 143 MG/DL (ref 70–110)
GLUCOSE BLD STRIP.AUTO-MCNC: 147 MG/DL (ref 70–110)
GLUCOSE BLD STRIP.AUTO-MCNC: 149 MG/DL (ref 70–110)
GLUCOSE BLD STRIP.AUTO-MCNC: 154 MG/DL (ref 70–110)
GLUCOSE BLD STRIP.AUTO-MCNC: 155 MG/DL (ref 70–110)
GLUCOSE BLD STRIP.AUTO-MCNC: 156 MG/DL (ref 70–110)
GLUCOSE BLD STRIP.AUTO-MCNC: 158 MG/DL (ref 70–110)
GLUCOSE BLD STRIP.AUTO-MCNC: 163 MG/DL (ref 70–110)
GLUCOSE BLD STRIP.AUTO-MCNC: 169 MG/DL (ref 70–110)
GLUCOSE BLD STRIP.AUTO-MCNC: 170 MG/DL (ref 70–110)
GLUCOSE BLD STRIP.AUTO-MCNC: 172 MG/DL (ref 70–110)
GLUCOSE BLD STRIP.AUTO-MCNC: 173 MG/DL (ref 70–110)
GLUCOSE BLD STRIP.AUTO-MCNC: 175 MG/DL (ref 70–110)
GLUCOSE BLD STRIP.AUTO-MCNC: 175 MG/DL (ref 70–110)
GLUCOSE BLD STRIP.AUTO-MCNC: 176 MG/DL (ref 70–110)
GLUCOSE BLD STRIP.AUTO-MCNC: 181 MG/DL (ref 70–110)
GLUCOSE BLD STRIP.AUTO-MCNC: 181 MG/DL (ref 70–110)
GLUCOSE BLD STRIP.AUTO-MCNC: 185 MG/DL (ref 70–110)
GLUCOSE BLD STRIP.AUTO-MCNC: 188 MG/DL (ref 70–110)
GLUCOSE BLD STRIP.AUTO-MCNC: 196 MG/DL (ref 70–110)
GLUCOSE BLD STRIP.AUTO-MCNC: 196 MG/DL (ref 70–110)
GLUCOSE BLD STRIP.AUTO-MCNC: 200 MG/DL (ref 70–110)
GLUCOSE BLD STRIP.AUTO-MCNC: 201 MG/DL (ref 70–110)
GLUCOSE BLD STRIP.AUTO-MCNC: 203 MG/DL (ref 70–110)
GLUCOSE BLD STRIP.AUTO-MCNC: 204 MG/DL (ref 70–110)
GLUCOSE BLD STRIP.AUTO-MCNC: 207 MG/DL (ref 70–110)
GLUCOSE BLD STRIP.AUTO-MCNC: 208 MG/DL (ref 70–110)
GLUCOSE BLD STRIP.AUTO-MCNC: 208 MG/DL (ref 70–110)
GLUCOSE BLD STRIP.AUTO-MCNC: 213 MG/DL (ref 70–110)
GLUCOSE BLD STRIP.AUTO-MCNC: 214 MG/DL (ref 70–110)
GLUCOSE BLD STRIP.AUTO-MCNC: 217 MG/DL (ref 70–110)
GLUCOSE BLD STRIP.AUTO-MCNC: 218 MG/DL (ref 70–110)
GLUCOSE BLD STRIP.AUTO-MCNC: 221 MG/DL (ref 70–110)
GLUCOSE BLD STRIP.AUTO-MCNC: 226 MG/DL (ref 70–110)
GLUCOSE BLD STRIP.AUTO-MCNC: 228 MG/DL (ref 70–110)
GLUCOSE BLD STRIP.AUTO-MCNC: 231 MG/DL (ref 70–110)
GLUCOSE BLD STRIP.AUTO-MCNC: 232 MG/DL (ref 70–110)
GLUCOSE BLD STRIP.AUTO-MCNC: 238 MG/DL (ref 70–110)
GLUCOSE BLD STRIP.AUTO-MCNC: 265 MG/DL (ref 70–110)
GLUCOSE BLD STRIP.AUTO-MCNC: 296 MG/DL (ref 70–110)
GLUCOSE BLD STRIP.AUTO-MCNC: 304 MG/DL (ref 70–110)
GLUCOSE BLD STRIP.AUTO-MCNC: 337 MG/DL (ref 70–110)
GLUCOSE BLD STRIP.AUTO-MCNC: 38 MG/DL (ref 70–110)
GLUCOSE BLD STRIP.AUTO-MCNC: 39 MG/DL (ref 70–110)
GLUCOSE BLD STRIP.AUTO-MCNC: 56 MG/DL (ref 70–110)
GLUCOSE BLD STRIP.AUTO-MCNC: 61 MG/DL (ref 70–110)
GLUCOSE BLD STRIP.AUTO-MCNC: 65 MG/DL (ref 70–110)
GLUCOSE BLD STRIP.AUTO-MCNC: 71 MG/DL (ref 70–110)
GLUCOSE BLD STRIP.AUTO-MCNC: 76 MG/DL (ref 70–110)
GLUCOSE BLD STRIP.AUTO-MCNC: 81 MG/DL (ref 70–110)
GLUCOSE BLD STRIP.AUTO-MCNC: 87 MG/DL (ref 70–110)
GLUCOSE BLD STRIP.AUTO-MCNC: 87 MG/DL (ref 70–110)
GLUCOSE BLD STRIP.AUTO-MCNC: 92 MG/DL (ref 70–110)
GLUCOSE BLD STRIP.AUTO-MCNC: 96 MG/DL (ref 70–110)
GLUCOSE SERPL-MCNC: 122 MG/DL (ref 74–99)
GLUCOSE SERPL-MCNC: 123 MG/DL (ref 74–99)
GLUCOSE SERPL-MCNC: 133 MG/DL (ref 74–99)
GLUCOSE SERPL-MCNC: 135 MG/DL (ref 74–99)
GLUCOSE SERPL-MCNC: 143 MG/DL (ref 74–99)
GLUCOSE SERPL-MCNC: 169 MG/DL (ref 74–99)
GLUCOSE SERPL-MCNC: 196 MG/DL (ref 74–99)
GLUCOSE SERPL-MCNC: 201 MG/DL (ref 74–99)
GLUCOSE SERPL-MCNC: 204 MG/DL (ref 74–99)
GLUCOSE SERPL-MCNC: 208 MG/DL (ref 74–99)
GLUCOSE SERPL-MCNC: 234 MG/DL (ref 74–99)
GLUCOSE SERPL-MCNC: 267 MG/DL (ref 74–99)
GLUCOSE SERPL-MCNC: 276 MG/DL (ref 74–99)
GLUCOSE SERPL-MCNC: 338 MG/DL (ref 74–99)
GLUCOSE SERPL-MCNC: 41 MG/DL (ref 74–99)
GLUCOSE SERPL-MCNC: 64 MG/DL (ref 74–99)
GLUCOSE SERPL-MCNC: 75 MG/DL (ref 74–99)
GLUCOSE UR STRIP.AUTO-MCNC: NEGATIVE MG/DL
GLUCOSE UR STRIP.AUTO-MCNC: NEGATIVE MG/DL
HBA1C MFR BLD: 7.8 % (ref 4.2–5.6)
HBA1C MFR BLD: 8.6 % (ref 4.2–5.6)
HCO3 BLD-SCNC: 22.1 MMOL/L (ref 22–26)
HCO3 BLD-SCNC: 28 MMOL/L (ref 22–26)
HCO3 BLD-SCNC: 28 MMOL/L (ref 22–26)
HCO3 BLD-SCNC: 28.1 MMOL/L (ref 22–26)
HCO3 BLD-SCNC: 30.1 MMOL/L (ref 22–26)
HCT VFR BLD AUTO: 34.6 % (ref 35–45)
HCT VFR BLD AUTO: 38 % (ref 35–45)
HCT VFR BLD AUTO: 39.3 % (ref 35–45)
HCT VFR BLD AUTO: 39.8 % (ref 35–45)
HCT VFR BLD AUTO: 40 % (ref 35–45)
HCT VFR BLD AUTO: 40.4 % (ref 35–45)
HCT VFR BLD AUTO: 40.7 % (ref 35–45)
HCT VFR BLD AUTO: 41 % (ref 35–45)
HCT VFR BLD AUTO: 41.6 % (ref 35–45)
HCT VFR BLD AUTO: 42.4 % (ref 35–45)
HCT VFR BLD AUTO: 42.8 % (ref 35–45)
HCT VFR BLD AUTO: 43.5 % (ref 35–45)
HCT VFR BLD AUTO: 45.4 % (ref 35–45)
HCT VFR BLD AUTO: 46 % (ref 35–45)
HCT VFR BLD AUTO: 53.1 % (ref 35–45)
HDLC SERPL-MCNC: 56 MG/DL (ref 40–60)
HDLC SERPL: 2.6 {RATIO} (ref 0–5)
HGB BLD-MCNC: 11.5 G/DL (ref 12–16)
HGB BLD-MCNC: 12.7 G/DL (ref 12–16)
HGB BLD-MCNC: 12.8 G/DL (ref 12–16)
HGB BLD-MCNC: 13.3 G/DL (ref 12–16)
HGB BLD-MCNC: 13.4 G/DL (ref 12–16)
HGB BLD-MCNC: 13.4 G/DL (ref 12–16)
HGB BLD-MCNC: 13.5 G/DL (ref 12–16)
HGB BLD-MCNC: 13.7 G/DL (ref 12–16)
HGB BLD-MCNC: 13.8 G/DL (ref 12–16)
HGB BLD-MCNC: 13.9 G/DL (ref 12–16)
HGB BLD-MCNC: 14.1 G/DL (ref 12–16)
HGB BLD-MCNC: 14.2 G/DL (ref 12–16)
HGB BLD-MCNC: 14.4 G/DL (ref 12–16)
HGB BLD-MCNC: 15 G/DL (ref 12–16)
HGB BLD-MCNC: 18.8 G/DL (ref 12–16)
HGB UR QL STRIP: ABNORMAL
HGB UR QL STRIP: ABNORMAL
HYALINE CASTS URNS QL MICRO: ABNORMAL /LPF (ref 0–2)
HYALINE CASTS URNS QL MICRO: NORMAL /LPF (ref 0–2)
INR PPP: 1.2 (ref 0.8–1.2)
INR PPP: 1.3 (ref 0.8–1.2)
INSPIRATION.DURATION SETTING TIME VENT: 0.9 SEC
INSPIRATION.DURATION SETTING TIME VENT: 0.9 SEC
INSPIRATION.DURATION SETTING TIME VENT: 1 SEC
INSPIRATION.DURATION SETTING TIME VENT: 1.2 SEC
IRON SATN MFR SERPL: 13 % (ref 20–50)
IRON SERPL-MCNC: 28 UG/DL (ref 50–175)
KETONES UR QL STRIP.AUTO: NEGATIVE MG/DL
KETONES UR QL STRIP.AUTO: NEGATIVE MG/DL
LACTATE BLD-SCNC: 1.59 MMOL/L (ref 0.4–2)
LACTATE BLD-SCNC: 2.29 MMOL/L (ref 0.4–2)
LACTATE BLD-SCNC: 3.7 MMOL/L (ref 0.4–2)
LACTATE SERPL-SCNC: 1.8 MMOL/L (ref 0.4–2)
LDLC SERPL CALC-MCNC: 69 MG/DL (ref 0–100)
LEUKOCYTE ESTERASE UR QL STRIP.AUTO: NEGATIVE
LEUKOCYTE ESTERASE UR QL STRIP.AUTO: NEGATIVE
LIPID PROFILE,FLP: NORMAL
LVFS 2D: 11.87 %
LVOT MG: 1.16 MMHG
LVOT MV: 0.49 CM/S
LVSV (TEICH): 20.08 ML
LYMPHOCYTES # BLD: 1 K/UL (ref 0.9–3.6)
LYMPHOCYTES # BLD: 1.1 K/UL (ref 0.9–3.6)
LYMPHOCYTES # BLD: 1.2 K/UL (ref 0.9–3.6)
LYMPHOCYTES # BLD: 1.4 K/UL (ref 0.9–3.6)
LYMPHOCYTES # BLD: 1.5 K/UL (ref 0.9–3.6)
LYMPHOCYTES # BLD: 1.5 K/UL (ref 0.9–3.6)
LYMPHOCYTES # BLD: 1.6 K/UL (ref 0.9–3.6)
LYMPHOCYTES # BLD: 1.6 K/UL (ref 0.9–3.6)
LYMPHOCYTES # BLD: 1.7 K/UL (ref 0.9–3.6)
LYMPHOCYTES # BLD: 2 K/UL (ref 0.9–3.6)
LYMPHOCYTES NFR BLD: 10 % (ref 21–52)
LYMPHOCYTES NFR BLD: 10 % (ref 21–52)
LYMPHOCYTES NFR BLD: 14 % (ref 21–52)
LYMPHOCYTES NFR BLD: 16 % (ref 21–52)
LYMPHOCYTES NFR BLD: 17 % (ref 21–52)
LYMPHOCYTES NFR BLD: 18 % (ref 21–52)
LYMPHOCYTES NFR BLD: 18 % (ref 21–52)
LYMPHOCYTES NFR BLD: 19 % (ref 21–52)
LYMPHOCYTES NFR BLD: 20 % (ref 21–52)
LYMPHOCYTES NFR BLD: 22 % (ref 21–52)
MAGNESIUM SERPL-MCNC: 1.6 MG/DL (ref 1.6–2.6)
MAGNESIUM SERPL-MCNC: 1.7 MG/DL (ref 1.6–2.6)
MAGNESIUM SERPL-MCNC: 1.9 MG/DL (ref 1.6–2.6)
MAGNESIUM SERPL-MCNC: 1.9 MG/DL (ref 1.6–2.6)
MAGNESIUM SERPL-MCNC: 2 MG/DL (ref 1.6–2.6)
MCH RBC QN AUTO: 31.6 PG (ref 24–34)
MCH RBC QN AUTO: 31.7 PG (ref 24–34)
MCH RBC QN AUTO: 31.8 PG (ref 24–34)
MCH RBC QN AUTO: 32.1 PG (ref 24–34)
MCH RBC QN AUTO: 32.3 PG (ref 24–34)
MCH RBC QN AUTO: 32.7 PG (ref 24–34)
MCH RBC QN AUTO: 32.8 PG (ref 24–34)
MCH RBC QN AUTO: 33 PG (ref 24–34)
MCH RBC QN AUTO: 33.2 PG (ref 24–34)
MCH RBC QN AUTO: 33.3 PG (ref 24–34)
MCH RBC QN AUTO: 33.4 PG (ref 24–34)
MCH RBC QN AUTO: 33.4 PG (ref 24–34)
MCH RBC QN AUTO: 33.5 PG (ref 24–34)
MCH RBC QN AUTO: 33.6 PG (ref 24–34)
MCH RBC QN AUTO: 33.8 PG (ref 24–34)
MCHC RBC AUTO-ENTMCNC: 31.3 G/DL (ref 31–37)
MCHC RBC AUTO-ENTMCNC: 32 G/DL (ref 31–37)
MCHC RBC AUTO-ENTMCNC: 32.5 G/DL (ref 31–37)
MCHC RBC AUTO-ENTMCNC: 32.6 G/DL (ref 31–37)
MCHC RBC AUTO-ENTMCNC: 32.9 G/DL (ref 31–37)
MCHC RBC AUTO-ENTMCNC: 32.9 G/DL (ref 31–37)
MCHC RBC AUTO-ENTMCNC: 33 G/DL (ref 31–37)
MCHC RBC AUTO-ENTMCNC: 33.2 G/DL (ref 31–37)
MCHC RBC AUTO-ENTMCNC: 33.3 G/DL (ref 31–37)
MCHC RBC AUTO-ENTMCNC: 33.4 G/DL (ref 31–37)
MCHC RBC AUTO-ENTMCNC: 33.5 G/DL (ref 31–37)
MCHC RBC AUTO-ENTMCNC: 33.7 G/DL (ref 31–37)
MCHC RBC AUTO-ENTMCNC: 33.7 G/DL (ref 31–37)
MCHC RBC AUTO-ENTMCNC: 33.9 G/DL (ref 31–37)
MCHC RBC AUTO-ENTMCNC: 35.4 G/DL (ref 31–37)
MCV RBC AUTO: 100.7 FL (ref 74–97)
MCV RBC AUTO: 100.9 FL (ref 74–97)
MCV RBC AUTO: 102.6 FL (ref 74–97)
MCV RBC AUTO: 105.5 FL (ref 74–97)
MCV RBC AUTO: 105.8 FL (ref 74–97)
MCV RBC AUTO: 94.6 FL (ref 74–97)
MCV RBC AUTO: 94.8 FL (ref 74–97)
MCV RBC AUTO: 95.2 FL (ref 74–97)
MCV RBC AUTO: 95.3 FL (ref 74–97)
MCV RBC AUTO: 95.6 FL (ref 74–97)
MCV RBC AUTO: 96 FL (ref 74–97)
MCV RBC AUTO: 99.5 FL (ref 74–97)
MCV RBC AUTO: 99.8 FL (ref 74–97)
MONOCYTES # BLD: 0.6 K/UL (ref 0.05–1.2)
MONOCYTES # BLD: 0.7 K/UL (ref 0.05–1.2)
MONOCYTES # BLD: 0.8 K/UL (ref 0.05–1.2)
MONOCYTES # BLD: 1 K/UL (ref 0.05–1.2)
MONOCYTES # BLD: 1 K/UL (ref 0.05–1.2)
MONOCYTES # BLD: 1.1 K/UL (ref 0.05–1.2)
MONOCYTES # BLD: 1.1 K/UL (ref 0.05–1.2)
MONOCYTES NFR BLD: 10 % (ref 3–10)
MONOCYTES NFR BLD: 12 % (ref 3–10)
MONOCYTES NFR BLD: 6 % (ref 3–10)
MONOCYTES NFR BLD: 7 % (ref 3–10)
MONOCYTES NFR BLD: 8 % (ref 3–10)
MONOCYTES NFR BLD: 8 % (ref 3–10)
MONOCYTES NFR BLD: 9 % (ref 3–10)
MUCOUS THREADS URNS QL MICRO: ABNORMAL /LPF
MYOGLOBIN UR QL: POSITIVE
MYOGLOBIN UR-MCNC: 2 NG/ML (ref 0–13)
N GONORRHOEA RRNA SPEC QL NAA+PROBE: NEGATIVE
NEUTS SEG # BLD: 5.3 K/UL (ref 1.8–8)
NEUTS SEG # BLD: 5.5 K/UL (ref 1.8–8)
NEUTS SEG # BLD: 5.7 K/UL (ref 1.8–8)
NEUTS SEG # BLD: 5.8 K/UL (ref 1.8–8)
NEUTS SEG # BLD: 6 K/UL (ref 1.8–8)
NEUTS SEG # BLD: 6.1 K/UL (ref 1.8–8)
NEUTS SEG # BLD: 6.1 K/UL (ref 1.8–8)
NEUTS SEG # BLD: 6.3 K/UL (ref 1.8–8)
NEUTS SEG # BLD: 6.3 K/UL (ref 1.8–8)
NEUTS SEG # BLD: 6.7 K/UL (ref 1.8–8)
NEUTS SEG # BLD: 7.7 K/UL (ref 1.8–8)
NEUTS SEG # BLD: 8.2 K/UL (ref 1.8–8)
NEUTS SEG NFR BLD: 64 % (ref 40–73)
NEUTS SEG NFR BLD: 67 % (ref 40–73)
NEUTS SEG NFR BLD: 70 % (ref 40–73)
NEUTS SEG NFR BLD: 70 % (ref 40–73)
NEUTS SEG NFR BLD: 71 % (ref 40–73)
NEUTS SEG NFR BLD: 72 % (ref 40–73)
NEUTS SEG NFR BLD: 73 % (ref 40–73)
NEUTS SEG NFR BLD: 73 % (ref 40–73)
NEUTS SEG NFR BLD: 76 % (ref 40–73)
NEUTS SEG NFR BLD: 77 % (ref 40–73)
NEUTS SEG NFR BLD: 82 % (ref 40–73)
NEUTS SEG NFR BLD: 83 % (ref 40–73)
NITRITE UR QL STRIP.AUTO: NEGATIVE
NITRITE UR QL STRIP.AUTO: NEGATIVE
O2/TOTAL GAS SETTING VFR VENT: 28 %
O2/TOTAL GAS SETTING VFR VENT: 28 %
O2/TOTAL GAS SETTING VFR VENT: 32 %
O2/TOTAL GAS SETTING VFR VENT: 35 %
O2/TOTAL GAS SETTING VFR VENT: 50 %
PCO2 BLD: 32.1 MMHG (ref 35–45)
PCO2 BLD: 35 MMHG (ref 35–45)
PCO2 BLD: 35.2 MMHG (ref 35–45)
PCO2 BLD: 39.2 MMHG (ref 35–45)
PCO2 BLD: 49.4 MMHG (ref 35–45)
PEEP RESPIRATORY: 5 CMH2O
PEEP RESPIRATORY: 5 CMH2O
PEEP RESPIRATORY: 6 CMH2O
PEEP RESPIRATORY: 6 CMH2O
PH BLD: 7.36 [PH] (ref 7.35–7.45)
PH BLD: 7.41 [PH] (ref 7.35–7.45)
PH BLD: 7.46 [PH] (ref 7.35–7.45)
PH BLD: 7.54 [PH] (ref 7.35–7.45)
PH BLD: 7.55 [PH] (ref 7.35–7.45)
PH UR STRIP: 5 [PH] (ref 5–8)
PH UR STRIP: 6.5 [PH] (ref 5–8)
PHOSPHATE SERPL-MCNC: 2.3 MG/DL (ref 2.5–4.9)
PHOSPHATE SERPL-MCNC: 3.5 MG/DL (ref 2.5–4.9)
PHOSPHATE SERPL-MCNC: 3.6 MG/DL (ref 2.5–4.9)
PHOSPHATE SERPL-MCNC: 3.7 MG/DL (ref 2.5–4.9)
PHOSPHATE SERPL-MCNC: 4 MG/DL (ref 2.5–4.9)
PIP ISTAT,IPIP: 20
PIP ISTAT,IPIP: 21
PIP ISTAT,IPIP: 22
PLATELET # BLD AUTO: 145 K/UL (ref 135–420)
PLATELET # BLD AUTO: 146 K/UL (ref 135–420)
PLATELET # BLD AUTO: 154 K/UL (ref 135–420)
PLATELET # BLD AUTO: 160 K/UL (ref 135–420)
PLATELET # BLD AUTO: 161 K/UL (ref 135–420)
PLATELET # BLD AUTO: 216 K/UL (ref 135–420)
PLATELET # BLD AUTO: 218 K/UL (ref 135–420)
PLATELET # BLD AUTO: 219 K/UL (ref 135–420)
PLATELET # BLD AUTO: 244 K/UL (ref 135–420)
PLATELET # BLD AUTO: 246 K/UL (ref 135–420)
PLATELET # BLD AUTO: 247 K/UL (ref 135–420)
PLATELET # BLD AUTO: 248 K/UL (ref 135–420)
PLATELET # BLD AUTO: 253 K/UL (ref 135–420)
PLATELET # BLD AUTO: 254 K/UL (ref 135–420)
PLATELET # BLD AUTO: 284 K/UL (ref 135–420)
PMV BLD AUTO: 11 FL (ref 9.2–11.8)
PMV BLD AUTO: 11.2 FL (ref 9.2–11.8)
PMV BLD AUTO: 11.3 FL (ref 9.2–11.8)
PMV BLD AUTO: 11.4 FL (ref 9.2–11.8)
PMV BLD AUTO: 11.5 FL (ref 9.2–11.8)
PMV BLD AUTO: 11.6 FL (ref 9.2–11.8)
PMV BLD AUTO: 11.6 FL (ref 9.2–11.8)
PMV BLD AUTO: 11.8 FL (ref 9.2–11.8)
PMV BLD AUTO: 12.3 FL (ref 9.2–11.8)
PMV BLD AUTO: 12.6 FL (ref 9.2–11.8)
PMV BLD AUTO: 12.9 FL (ref 9.2–11.8)
PO2 BLD: 125 MMHG (ref 80–100)
PO2 BLD: 159 MMHG (ref 80–100)
PO2 BLD: 79 MMHG (ref 80–100)
PO2 BLD: 91 MMHG (ref 80–100)
PO2 BLD: 96 MMHG (ref 80–100)
POTASSIUM SERPL-SCNC: 3.2 MMOL/L (ref 3.5–5.5)
POTASSIUM SERPL-SCNC: 3.3 MMOL/L (ref 3.5–5.5)
POTASSIUM SERPL-SCNC: 3.4 MMOL/L (ref 3.5–5.5)
POTASSIUM SERPL-SCNC: 3.5 MMOL/L (ref 3.5–5.5)
POTASSIUM SERPL-SCNC: 3.6 MMOL/L (ref 3.5–5.5)
POTASSIUM SERPL-SCNC: 3.8 MMOL/L (ref 3.5–5.5)
POTASSIUM SERPL-SCNC: 4 MMOL/L (ref 3.5–5.5)
POTASSIUM SERPL-SCNC: 4 MMOL/L (ref 3.5–5.5)
POTASSIUM SERPL-SCNC: 4.1 MMOL/L (ref 3.5–5.5)
POTASSIUM SERPL-SCNC: 4.1 MMOL/L (ref 3.5–5.5)
POTASSIUM SERPL-SCNC: 4.2 MMOL/L (ref 3.5–5.5)
POTASSIUM SERPL-SCNC: 4.3 MMOL/L (ref 3.5–5.5)
POTASSIUM SERPL-SCNC: 4.4 MMOL/L (ref 3.5–5.5)
POTASSIUM SERPL-SCNC: 4.5 MMOL/L (ref 3.5–5.5)
POTASSIUM SERPL-SCNC: 4.6 MMOL/L (ref 3.5–5.5)
POTASSIUM SERPL-SCNC: 4.9 MMOL/L (ref 3.5–5.5)
POTASSIUM SERPL-SCNC: 5 MMOL/L (ref 3.5–5.5)
POTASSIUM SERPL-SCNC: 5.9 MMOL/L (ref 3.5–5.5)
PROCALCITONIN SERPL-MCNC: 0.76 NG/ML
PROCALCITONIN SERPL-MCNC: 2.94 NG/ML
PROT SERPL-MCNC: 4.8 G/DL (ref 6.4–8.2)
PROT SERPL-MCNC: 5.3 G/DL (ref 6.4–8.2)
PROT SERPL-MCNC: 5.7 G/DL (ref 6.4–8.2)
PROT SERPL-MCNC: 5.9 G/DL (ref 6.4–8.2)
PROT SERPL-MCNC: 6.2 G/DL (ref 6.4–8.2)
PROT SERPL-MCNC: 6.8 G/DL (ref 6.4–8.2)
PROT UR STRIP-MCNC: 100 MG/DL
PROT UR STRIP-MCNC: 100 MG/DL
PROTHROMBIN TIME: 15.4 SEC (ref 11.5–15.2)
PROTHROMBIN TIME: 15.6 SEC (ref 11.5–15.2)
Q-T INTERVAL, ECG07: 260 MS
Q-T INTERVAL, ECG07: 314 MS
Q-T INTERVAL, ECG07: 320 MS
Q-T INTERVAL, ECG07: 372 MS
Q-T INTERVAL, ECG07: 402 MS
Q-T INTERVAL, ECG07: 438 MS
QRS DURATION, ECG06: 104 MS
QRS DURATION, ECG06: 72 MS
QRS DURATION, ECG06: 76 MS
QRS DURATION, ECG06: 86 MS
QRS DURATION, ECG06: 88 MS
QRS DURATION, ECG06: 96 MS
QTC CALCULATION (BEZET), ECG08: 392 MS
QTC CALCULATION (BEZET), ECG08: 411 MS
QTC CALCULATION (BEZET), ECG08: 466 MS
QTC CALCULATION (BEZET), ECG08: 498 MS
QTC CALCULATION (BEZET), ECG08: 511 MS
QTC CALCULATION (BEZET), ECG08: 544 MS
RBC # BLD AUTO: 3.63 M/UL (ref 4.2–5.3)
RBC # BLD AUTO: 3.83 M/UL (ref 4.2–5.3)
RBC # BLD AUTO: 3.99 M/UL (ref 4.2–5.3)
RBC # BLD AUTO: 4 M/UL (ref 4.2–5.3)
RBC # BLD AUTO: 4.01 M/UL (ref 4.2–5.3)
RBC # BLD AUTO: 4.08 M/UL (ref 4.2–5.3)
RBC # BLD AUTO: 4.11 M/UL (ref 4.2–5.3)
RBC # BLD AUTO: 4.13 M/UL (ref 4.2–5.3)
RBC # BLD AUTO: 4.24 M/UL (ref 4.2–5.3)
RBC # BLD AUTO: 4.25 M/UL (ref 4.2–5.3)
RBC # BLD AUTO: 4.27 M/UL (ref 4.2–5.3)
RBC # BLD AUTO: 4.27 M/UL (ref 4.2–5.3)
RBC # BLD AUTO: 4.36 M/UL (ref 4.2–5.3)
RBC # BLD AUTO: 4.75 M/UL (ref 4.2–5.3)
RBC # BLD AUTO: 5.6 M/UL (ref 4.2–5.3)
RBC #/AREA URNS HPF: ABNORMAL /HPF (ref 0–5)
RBC #/AREA URNS HPF: NORMAL /HPF (ref 0–5)
SAO2 % BLD: 96 % (ref 92–97)
SAO2 % BLD: 98 % (ref 92–97)
SAO2 % BLD: 98 % (ref 92–97)
SAO2 % BLD: 99 % (ref 92–97)
SAO2 % BLD: 99 % (ref 92–97)
SARS-COV-2, COV2NT: NOT DETECTED
SERVICE CMNT-IMP: ABNORMAL
SERVICE CMNT-IMP: NORMAL
SODIUM SERPL-SCNC: 137 MMOL/L (ref 136–145)
SODIUM SERPL-SCNC: 138 MMOL/L (ref 136–145)
SODIUM SERPL-SCNC: 139 MMOL/L (ref 136–145)
SODIUM SERPL-SCNC: 141 MMOL/L (ref 136–145)
SODIUM SERPL-SCNC: 142 MMOL/L (ref 136–145)
SODIUM SERPL-SCNC: 143 MMOL/L (ref 136–145)
SODIUM SERPL-SCNC: 143 MMOL/L (ref 136–145)
SODIUM SERPL-SCNC: 144 MMOL/L (ref 136–145)
SODIUM SERPL-SCNC: 144 MMOL/L (ref 136–145)
SODIUM SERPL-SCNC: 145 MMOL/L (ref 136–145)
SODIUM SERPL-SCNC: 145 MMOL/L (ref 136–145)
SODIUM SERPL-SCNC: 146 MMOL/L (ref 136–145)
SODIUM SERPL-SCNC: 146 MMOL/L (ref 136–145)
SODIUM SERPL-SCNC: 147 MMOL/L (ref 136–145)
SODIUM SERPL-SCNC: 148 MMOL/L (ref 136–145)
SOURCE, COVRS: NORMAL
SP GR UR REFRACTOMETRY: 1.01 (ref 1–1.03)
SP GR UR REFRACTOMETRY: 1.02 (ref 1–1.03)
SPECIMEN SOURCE: NORMAL
SPECIMEN TYPE, XMCV1T: NORMAL
SPECIMEN TYPE, XMCV1T: NORMAL
SPECIMEN TYPE: ABNORMAL
T3FREE SERPL-MCNC: 0.7 PG/ML (ref 2.18–3.98)
T4 FREE SERPL-MCNC: 0.6 NG/DL (ref 0.7–1.5)
T4 FREE SERPL-MCNC: 0.7 NG/DL (ref 0.7–1.5)
T4 FREE SERPL-MCNC: 0.8 NG/DL (ref 0.7–1.5)
T4 FREE SERPL-MCNC: 0.8 NG/DL (ref 0.7–1.5)
T4 FREE SERPL-MCNC: 1.4 NG/DL (ref 0.7–1.5)
TIBC SERPL-MCNC: 223 UG/DL (ref 250–450)
TOTAL RESP. RATE, ITRR: 12
TOTAL RESP. RATE, ITRR: 15
TOTAL RESP. RATE, ITRR: 15
TOTAL RESP. RATE, ITRR: 17
TOTAL RESP. RATE, ITRR: 18
TRIGL SERPL-MCNC: 100 MG/DL (ref ?–150)
TROPONIN I SERPL-MCNC: 0.05 NG/ML (ref 0–0.04)
TROPONIN I SERPL-MCNC: 0.12 NG/ML (ref 0–0.04)
TROPONIN I SERPL-MCNC: 0.13 NG/ML (ref 0–0.04)
TROPONIN I SERPL-MCNC: 0.13 NG/ML (ref 0–0.04)
TROPONIN I SERPL-MCNC: 0.15 NG/ML (ref 0–0.04)
TROPONIN I SERPL-MCNC: 0.53 NG/ML (ref 0–0.04)
TROPONIN I SERPL-MCNC: 0.62 NG/ML (ref 0–0.04)
TROPONIN I SERPL-MCNC: 0.72 NG/ML (ref 0–0.04)
TROPONIN I SERPL-MCNC: 0.8 NG/ML (ref 0–0.04)
TROPONIN I SERPL-MCNC: 0.86 NG/ML (ref 0–0.04)
TSH SERPL DL<=0.05 MIU/L-ACNC: 0.9 UIU/ML (ref 0.36–3.74)
TSH SERPL DL<=0.05 MIU/L-ACNC: 4.9 UIU/ML (ref 0.36–3.74)
UROBILINOGEN UR QL STRIP.AUTO: 0.2 EU/DL (ref 0.2–1)
UROBILINOGEN UR QL STRIP.AUTO: 1 EU/DL (ref 0.2–1)
VENTILATION MODE VENT: ABNORMAL
VENTRICULAR RATE, ECG03: 103 BPM
VENTRICULAR RATE, ECG03: 129 BPM
VENTRICULAR RATE, ECG03: 137 BPM
VENTRICULAR RATE, ECG03: 146 BPM
VENTRICULAR RATE, ECG03: 81 BPM
VENTRICULAR RATE, ECG03: 82 BPM
VIT B1 BLD-SCNC: 122.3 NMOL/L (ref 66.5–200)
VIT B12 SERPL-MCNC: >2000 PG/ML (ref 211–911)
VIT C SERPL-MCNC: 0.4 MG/DL (ref 0.4–2)
VLDLC SERPL CALC-MCNC: 20 MG/DL
VOLUME CONTROL PLUS IVLCP: YES
VOLUME CONTROL PLUS IVLCP: YES
VT SETTING VENT: 400 ML
WBC # BLD AUTO: 10.3 K/UL (ref 4.6–13.2)
WBC # BLD AUTO: 7.1 K/UL (ref 4.6–13.2)
WBC # BLD AUTO: 7.6 K/UL (ref 4.6–13.2)
WBC # BLD AUTO: 8.1 K/UL (ref 4.6–13.2)
WBC # BLD AUTO: 8.2 K/UL (ref 4.6–13.2)
WBC # BLD AUTO: 8.4 K/UL (ref 4.6–13.2)
WBC # BLD AUTO: 8.5 K/UL (ref 4.6–13.2)
WBC # BLD AUTO: 8.7 K/UL (ref 4.6–13.2)
WBC # BLD AUTO: 8.8 K/UL (ref 4.6–13.2)
WBC # BLD AUTO: 9 K/UL (ref 4.6–13.2)
WBC # BLD AUTO: 9 K/UL (ref 4.6–13.2)
WBC # BLD AUTO: 9.3 K/UL (ref 4.6–13.2)
WBC # BLD AUTO: 9.4 K/UL (ref 4.6–13.2)
WBC # BLD AUTO: 9.5 K/UL (ref 4.6–13.2)
WBC # BLD AUTO: 9.9 K/UL (ref 4.6–13.2)
WBC URNS QL MICRO: ABNORMAL /HPF (ref 0–4)
WBC URNS QL MICRO: NORMAL /HPF (ref 0–4)
WET PREP GENITAL: NORMAL

## 2020-01-01 PROCEDURE — 82962 GLUCOSE BLOOD TEST: CPT

## 2020-01-01 PROCEDURE — 74011250636 HC RX REV CODE- 250/636: Performed by: STUDENT IN AN ORGANIZED HEALTH CARE EDUCATION/TRAINING PROGRAM

## 2020-01-01 PROCEDURE — 94400 HC END TIDAL CO2 RESPONSE CURVE: CPT

## 2020-01-01 PROCEDURE — 65660000000 HC RM CCU STEPDOWN

## 2020-01-01 PROCEDURE — 74011250637 HC RX REV CODE- 250/637: Performed by: INTERNAL MEDICINE

## 2020-01-01 PROCEDURE — 65610000006 HC RM INTENSIVE CARE

## 2020-01-01 PROCEDURE — 74011250636 HC RX REV CODE- 250/636: Performed by: PHYSICIAN ASSISTANT

## 2020-01-01 PROCEDURE — 74011250637 HC RX REV CODE- 250/637: Performed by: NURSE PRACTITIONER

## 2020-01-01 PROCEDURE — 74011000250 HC RX REV CODE- 250: Performed by: EMERGENCY MEDICINE

## 2020-01-01 PROCEDURE — 83735 ASSAY OF MAGNESIUM: CPT

## 2020-01-01 PROCEDURE — 76450000000

## 2020-01-01 PROCEDURE — 83880 ASSAY OF NATRIURETIC PEPTIDE: CPT

## 2020-01-01 PROCEDURE — 93971 EXTREMITY STUDY: CPT

## 2020-01-01 PROCEDURE — 76060000032 HC ANESTHESIA 0.5 TO 1 HR

## 2020-01-01 PROCEDURE — 93005 ELECTROCARDIOGRAM TRACING: CPT

## 2020-01-01 PROCEDURE — 74011250636 HC RX REV CODE- 250/636: Performed by: INTERNAL MEDICINE

## 2020-01-01 PROCEDURE — 36600 WITHDRAWAL OF ARTERIAL BLOOD: CPT

## 2020-01-01 PROCEDURE — 74011250637 HC RX REV CODE- 250/637: Performed by: EMERGENCY MEDICINE

## 2020-01-01 PROCEDURE — 65270000029 HC RM PRIVATE

## 2020-01-01 PROCEDURE — 85730 THROMBOPLASTIN TIME PARTIAL: CPT

## 2020-01-01 PROCEDURE — 74011000636 HC RX REV CODE- 636: Performed by: EMERGENCY MEDICINE

## 2020-01-01 PROCEDURE — 80048 BASIC METABOLIC PNL TOTAL CA: CPT

## 2020-01-01 PROCEDURE — 71045 X-RAY EXAM CHEST 1 VIEW: CPT

## 2020-01-01 PROCEDURE — 99285 EMERGENCY DEPT VISIT HI MDM: CPT

## 2020-01-01 PROCEDURE — 97166 OT EVAL MOD COMPLEX 45 MIN: CPT

## 2020-01-01 PROCEDURE — 74011636637 HC RX REV CODE- 636/637: Performed by: EMERGENCY MEDICINE

## 2020-01-01 PROCEDURE — 77030040393 HC DRSG OPTIFOAM GENT MDII -B

## 2020-01-01 PROCEDURE — 74011636637 HC RX REV CODE- 636/637: Performed by: HOSPITALIST

## 2020-01-01 PROCEDURE — 82306 VITAMIN D 25 HYDROXY: CPT

## 2020-01-01 PROCEDURE — 36415 COLL VENOUS BLD VENIPUNCTURE: CPT

## 2020-01-01 PROCEDURE — 82977 ASSAY OF GGT: CPT

## 2020-01-01 PROCEDURE — 87491 CHLMYD TRACH DNA AMP PROBE: CPT

## 2020-01-01 PROCEDURE — 84100 ASSAY OF PHOSPHORUS: CPT

## 2020-01-01 PROCEDURE — 84439 ASSAY OF FREE THYROXINE: CPT

## 2020-01-01 PROCEDURE — 77030040392 HC DRSG OPTIFOAM MDII -A

## 2020-01-01 PROCEDURE — 82140 ASSAY OF AMMONIA: CPT

## 2020-01-01 PROCEDURE — 94762 N-INVAS EAR/PLS OXIMTRY CONT: CPT

## 2020-01-01 PROCEDURE — 82550 ASSAY OF CK (CPK): CPT

## 2020-01-01 PROCEDURE — 99291 CRITICAL CARE FIRST HOUR: CPT | Performed by: INTERNAL MEDICINE

## 2020-01-01 PROCEDURE — 84145 PROCALCITONIN (PCT): CPT

## 2020-01-01 PROCEDURE — C9113 INJ PANTOPRAZOLE SODIUM, VIA: HCPCS | Performed by: STUDENT IN AN ORGANIZED HEALTH CARE EDUCATION/TRAINING PROGRAM

## 2020-01-01 PROCEDURE — 74011000250 HC RX REV CODE- 250: Performed by: INTERNAL MEDICINE

## 2020-01-01 PROCEDURE — 99222 1ST HOSP IP/OBS MODERATE 55: CPT | Performed by: NURSE PRACTITIONER

## 2020-01-01 PROCEDURE — 51702 INSERT TEMP BLADDER CATH: CPT

## 2020-01-01 PROCEDURE — 92526 ORAL FUNCTION THERAPY: CPT

## 2020-01-01 PROCEDURE — 94002 VENT MGMT INPAT INIT DAY: CPT

## 2020-01-01 PROCEDURE — 74011250636 HC RX REV CODE- 250/636: Performed by: EMERGENCY MEDICINE

## 2020-01-01 PROCEDURE — 74011636637 HC RX REV CODE- 636/637: Performed by: INTERNAL MEDICINE

## 2020-01-01 PROCEDURE — 80053 COMPREHEN METABOLIC PANEL: CPT

## 2020-01-01 PROCEDURE — 96365 THER/PROPH/DIAG IV INF INIT: CPT

## 2020-01-01 PROCEDURE — 82375 ASSAY CARBOXYHB QUANT: CPT

## 2020-01-01 PROCEDURE — 97110 THERAPEUTIC EXERCISES: CPT

## 2020-01-01 PROCEDURE — 93306 TTE W/DOPPLER COMPLETE: CPT

## 2020-01-01 PROCEDURE — P9047 ALBUMIN (HUMAN), 25%, 50ML: HCPCS | Performed by: PHYSICIAN ASSISTANT

## 2020-01-01 PROCEDURE — 83540 ASSAY OF IRON: CPT

## 2020-01-01 PROCEDURE — 97162 PT EVAL MOD COMPLEX 30 MIN: CPT

## 2020-01-01 PROCEDURE — 96361 HYDRATE IV INFUSION ADD-ON: CPT

## 2020-01-01 PROCEDURE — 74011000250 HC RX REV CODE- 250: Performed by: NURSE ANESTHETIST, CERTIFIED REGISTERED

## 2020-01-01 PROCEDURE — 97530 THERAPEUTIC ACTIVITIES: CPT

## 2020-01-01 PROCEDURE — 77010033678 HC OXYGEN DAILY

## 2020-01-01 PROCEDURE — 82803 BLOOD GASES ANY COMBINATION: CPT

## 2020-01-01 PROCEDURE — 81002 URINALYSIS NONAUTO W/O SCOPE: CPT

## 2020-01-01 PROCEDURE — 84443 ASSAY THYROID STIM HORMONE: CPT

## 2020-01-01 PROCEDURE — 83036 HEMOGLOBIN GLYCOSYLATED A1C: CPT

## 2020-01-01 PROCEDURE — 85027 COMPLETE CBC AUTOMATED: CPT

## 2020-01-01 PROCEDURE — 83605 ASSAY OF LACTIC ACID: CPT

## 2020-01-01 PROCEDURE — 85610 PROTHROMBIN TIME: CPT

## 2020-01-01 PROCEDURE — 74011000250 HC RX REV CODE- 250

## 2020-01-01 PROCEDURE — 85025 COMPLETE CBC W/AUTO DIFF WBC: CPT

## 2020-01-01 PROCEDURE — 96366 THER/PROPH/DIAG IV INF ADDON: CPT

## 2020-01-01 PROCEDURE — 74011000258 HC RX REV CODE- 258: Performed by: EMERGENCY MEDICINE

## 2020-01-01 PROCEDURE — 82180 ASSAY OF ASCORBIC ACID: CPT

## 2020-01-01 PROCEDURE — 74176 CT ABD & PELVIS W/O CONTRAST: CPT

## 2020-01-01 PROCEDURE — 74011250636 HC RX REV CODE- 250/636

## 2020-01-01 PROCEDURE — 81001 URINALYSIS AUTO W/SCOPE: CPT

## 2020-01-01 PROCEDURE — 97535 SELF CARE MNGMENT TRAINING: CPT

## 2020-01-01 PROCEDURE — 74011636637 HC RX REV CODE- 636/637: Performed by: STUDENT IN AN ORGANIZED HEALTH CARE EDUCATION/TRAINING PROGRAM

## 2020-01-01 PROCEDURE — 74011250637 HC RX REV CODE- 250/637: Performed by: HOSPITALIST

## 2020-01-01 PROCEDURE — 86580 TB INTRADERMAL TEST: CPT | Performed by: HOSPITALIST

## 2020-01-01 PROCEDURE — 0BH17EZ INSERTION OF ENDOTRACHEAL AIRWAY INTO TRACHEA, VIA NATURAL OR ARTIFICIAL OPENING: ICD-10-PCS | Performed by: EMERGENCY MEDICINE

## 2020-01-01 PROCEDURE — 92610 EVALUATE SWALLOWING FUNCTION: CPT

## 2020-01-01 PROCEDURE — 77030038269 HC DRN EXT URIN PURWCK BARD -A

## 2020-01-01 PROCEDURE — 96374 THER/PROPH/DIAG INJ IV PUSH: CPT

## 2020-01-01 PROCEDURE — 87040 BLOOD CULTURE FOR BACTERIA: CPT

## 2020-01-01 PROCEDURE — 74011250637 HC RX REV CODE- 250/637: Performed by: PHYSICIAN ASSISTANT

## 2020-01-01 PROCEDURE — 82607 VITAMIN B-12: CPT

## 2020-01-01 PROCEDURE — 87635 SARS-COV-2 COVID-19 AMP PRB: CPT

## 2020-01-01 PROCEDURE — 74011000250 HC RX REV CODE- 250: Performed by: STUDENT IN AN ORGANIZED HEALTH CARE EDUCATION/TRAINING PROGRAM

## 2020-01-01 PROCEDURE — 84484 ASSAY OF TROPONIN QUANT: CPT

## 2020-01-01 PROCEDURE — 80307 DRUG TEST PRSMV CHEM ANLYZR: CPT

## 2020-01-01 PROCEDURE — 70450 CT HEAD/BRAIN W/O DYE: CPT

## 2020-01-01 PROCEDURE — 74011250637 HC RX REV CODE- 250/637: Performed by: STUDENT IN AN ORGANIZED HEALTH CARE EDUCATION/TRAINING PROGRAM

## 2020-01-01 PROCEDURE — 74011250636 HC RX REV CODE- 250/636: Performed by: NURSE PRACTITIONER

## 2020-01-01 PROCEDURE — 74011000258 HC RX REV CODE- 258: Performed by: NURSE ANESTHETIST, CERTIFIED REGISTERED

## 2020-01-01 PROCEDURE — 5A1945Z RESPIRATORY VENTILATION, 24-96 CONSECUTIVE HOURS: ICD-10-PCS | Performed by: EMERGENCY MEDICINE

## 2020-01-01 PROCEDURE — 77030038554 HC DRSG WND THERAHONEY MDII -Z

## 2020-01-01 PROCEDURE — 94003 VENT MGMT INPAT SUBQ DAY: CPT

## 2020-01-01 PROCEDURE — 80061 LIPID PANEL: CPT

## 2020-01-01 PROCEDURE — 77030040831 HC BAG URINE DRNG MDII -A

## 2020-01-01 PROCEDURE — 31500 INSERT EMERGENCY AIRWAY: CPT

## 2020-01-01 PROCEDURE — 77030037878 HC DRSG MEPILEX >48IN BORD MOLN -B

## 2020-01-01 PROCEDURE — 74011000258 HC RX REV CODE- 258: Performed by: INTERNAL MEDICINE

## 2020-01-01 PROCEDURE — P9045 ALBUMIN (HUMAN), 5%, 250 ML: HCPCS | Performed by: PHYSICIAN ASSISTANT

## 2020-01-01 PROCEDURE — 83874 ASSAY OF MYOGLOBIN: CPT

## 2020-01-01 PROCEDURE — 72192 CT PELVIS W/O DYE: CPT

## 2020-01-01 PROCEDURE — 74011000258 HC RX REV CODE- 258: Performed by: PHYSICIAN ASSISTANT

## 2020-01-01 PROCEDURE — 93970 EXTREMITY STUDY: CPT

## 2020-01-01 PROCEDURE — 2709999900 HC NON-CHARGEABLE SUPPLY

## 2020-01-01 PROCEDURE — 77030040830 HC CATH URETH FOL MDII -A

## 2020-01-01 PROCEDURE — 74011000302 HC RX REV CODE- 302: Performed by: HOSPITALIST

## 2020-01-01 PROCEDURE — 74011250636 HC RX REV CODE- 250/636: Performed by: NURSE ANESTHETIST, CERTIFIED REGISTERED

## 2020-01-01 PROCEDURE — 97168 OT RE-EVAL EST PLAN CARE: CPT

## 2020-01-01 PROCEDURE — 87210 SMEAR WET MOUNT SALINE/INK: CPT

## 2020-01-01 PROCEDURE — 84481 FREE ASSAY (FT-3): CPT

## 2020-01-01 PROCEDURE — 96375 TX/PRO/DX INJ NEW DRUG ADDON: CPT

## 2020-01-01 PROCEDURE — 77030041247 HC PROTECTOR HEEL HEELMEDIX MDII -B

## 2020-01-01 PROCEDURE — 71275 CT ANGIOGRAPHY CHEST: CPT

## 2020-01-01 PROCEDURE — 84132 ASSAY OF SERUM POTASSIUM: CPT

## 2020-01-01 PROCEDURE — 84425 ASSAY OF VITAMIN B-1: CPT

## 2020-01-01 PROCEDURE — 77030012890

## 2020-01-01 PROCEDURE — 93308 TTE F-UP OR LMTD: CPT

## 2020-01-01 PROCEDURE — 82553 CREATINE MB FRACTION: CPT

## 2020-01-01 RX ORDER — AMIODARONE HYDROCHLORIDE 150 MG/3ML
INJECTION, SOLUTION INTRAVENOUS
Status: DISPENSED
Start: 2020-01-01 | End: 2020-01-01

## 2020-01-01 RX ORDER — AMIODARONE HYDROCHLORIDE 150 MG/3ML
150 INJECTION, SOLUTION INTRAVENOUS
Status: ACTIVE | OUTPATIENT
Start: 2020-01-01 | End: 2020-01-01

## 2020-01-01 RX ORDER — CEFPODOXIME PROXETIL 100 MG/1
100 TABLET, FILM COATED ORAL 2 TIMES DAILY
Qty: 8 TAB | Refills: 0 | Status: SHIPPED
Start: 2020-01-01 | End: 2020-01-01

## 2020-01-01 RX ORDER — MILRINONE LACTATE 0.2 MG/ML
0.38 INJECTION, SOLUTION INTRAVENOUS CONTINUOUS
Status: DISCONTINUED | OUTPATIENT
Start: 2020-01-01 | End: 2020-01-01

## 2020-01-01 RX ORDER — FENTANYL CITRATE 50 UG/ML
50-100 INJECTION, SOLUTION INTRAMUSCULAR; INTRAVENOUS
Status: DISCONTINUED | OUTPATIENT
Start: 2020-01-01 | End: 2020-10-23 | Stop reason: HOSPADM

## 2020-01-01 RX ORDER — CHLORHEXIDINE GLUCONATE 1.2 MG/ML
10 RINSE ORAL EVERY 12 HOURS
Status: DISCONTINUED | OUTPATIENT
Start: 2020-01-01 | End: 2020-01-01

## 2020-01-01 RX ORDER — ACETAMINOPHEN 325 MG/1
650 TABLET ORAL
Status: DISCONTINUED | OUTPATIENT
Start: 2020-01-01 | End: 2020-10-23 | Stop reason: HOSPADM

## 2020-01-01 RX ORDER — DIGOXIN 0.25 MG/ML
125 INJECTION INTRAMUSCULAR; INTRAVENOUS DAILY
Status: DISCONTINUED | OUTPATIENT
Start: 2020-10-23 | End: 2020-01-01

## 2020-01-01 RX ORDER — MIDAZOLAM IN 0.9 % SOD.CHLORID 1 MG/ML
0-10 PLASTIC BAG, INJECTION (ML) INTRAVENOUS
Status: DISCONTINUED | OUTPATIENT
Start: 2020-01-01 | End: 2020-10-23 | Stop reason: HOSPADM

## 2020-01-01 RX ORDER — PROMETHAZINE HYDROCHLORIDE 12.5 MG/1
12.5 TABLET ORAL
Status: DISCONTINUED | OUTPATIENT
Start: 2020-01-01 | End: 2020-01-01 | Stop reason: HOSPADM

## 2020-01-01 RX ORDER — GUAIFENESIN 100 MG/5ML
324 LIQUID (ML) ORAL
Status: COMPLETED | OUTPATIENT
Start: 2020-01-01 | End: 2020-01-01

## 2020-01-01 RX ORDER — AMOXICILLIN 250 MG
2 CAPSULE ORAL
Qty: 30 TAB | Refills: 0 | Status: SHIPPED
Start: 2020-01-01

## 2020-01-01 RX ORDER — FENTANYL CITRATE 50 UG/ML
50-100 INJECTION, SOLUTION INTRAMUSCULAR; INTRAVENOUS
Status: DISCONTINUED | OUTPATIENT
Start: 2020-01-01 | End: 2020-01-01 | Stop reason: SDUPTHER

## 2020-01-01 RX ORDER — GLIPIZIDE 2.5 MG/1
2.5 TABLET, EXTENDED RELEASE ORAL DAILY
Qty: 30 TAB | Refills: 0 | Status: SHIPPED
Start: 2020-01-01 | End: 2020-01-01

## 2020-01-01 RX ORDER — HEPARIN SODIUM 10000 [USP'U]/100ML
18-36 INJECTION, SOLUTION INTRAVENOUS
Status: DISCONTINUED | OUTPATIENT
Start: 2020-01-01 | End: 2020-01-01

## 2020-01-01 RX ORDER — LANOLIN ALCOHOL/MO/W.PET/CERES
100 CREAM (GRAM) TOPICAL DAILY
Status: DISCONTINUED | OUTPATIENT
Start: 2020-01-01 | End: 2020-01-01 | Stop reason: HOSPADM

## 2020-01-01 RX ORDER — LANOLIN ALCOHOL/MO/W.PET/CERES
325 CREAM (GRAM) TOPICAL
Qty: 30 TAB | Refills: 0 | Status: SHIPPED
Start: 2020-01-01

## 2020-01-01 RX ORDER — LANOLIN ALCOHOL/MO/W.PET/CERES
325 CREAM (GRAM) TOPICAL
Status: DISCONTINUED | OUTPATIENT
Start: 2020-01-01 | End: 2020-01-01

## 2020-01-01 RX ORDER — DEXTROSE 50 % IN WATER (D50W) INTRAVENOUS SYRINGE
Status: COMPLETED
Start: 2020-01-01 | End: 2020-01-01

## 2020-01-01 RX ORDER — FENTANYL CITRATE 50 UG/ML
INJECTION, SOLUTION INTRAMUSCULAR; INTRAVENOUS
Status: COMPLETED
Start: 2020-01-01 | End: 2020-01-01

## 2020-01-01 RX ORDER — INSULIN GLARGINE 100 [IU]/ML
4 INJECTION, SOLUTION SUBCUTANEOUS
Status: DISCONTINUED | OUTPATIENT
Start: 2020-01-01 | End: 2020-01-01

## 2020-01-01 RX ORDER — LEVOTHYROXINE SODIUM 100 UG/1
100 TABLET ORAL
Qty: 30 TAB | Refills: 0 | Status: SHIPPED
Start: 2020-01-01

## 2020-01-01 RX ORDER — POLYETHYLENE GLYCOL 3350 17 G/17G
17 POWDER, FOR SOLUTION ORAL
Qty: 15 PACKET | Refills: 0 | Status: SHIPPED
Start: 2020-01-01

## 2020-01-01 RX ORDER — DIGOXIN 0.25 MG/ML
250 INJECTION INTRAMUSCULAR; INTRAVENOUS
Status: COMPLETED | OUTPATIENT
Start: 2020-01-01 | End: 2020-01-01

## 2020-01-01 RX ORDER — FENTANYL CITRATE 50 UG/ML
100 INJECTION, SOLUTION INTRAMUSCULAR; INTRAVENOUS
Status: COMPLETED | OUTPATIENT
Start: 2020-01-01 | End: 2020-01-01

## 2020-01-01 RX ORDER — HEPARIN SODIUM 1000 [USP'U]/ML
80 INJECTION, SOLUTION INTRAVENOUS; SUBCUTANEOUS ONCE
Status: COMPLETED | OUTPATIENT
Start: 2020-01-01 | End: 2020-01-01

## 2020-01-01 RX ORDER — GLYCOPYRROLATE 0.2 MG/ML
0.2 INJECTION INTRAMUSCULAR; INTRAVENOUS
Status: CANCELLED | OUTPATIENT
Start: 2020-01-01

## 2020-01-01 RX ORDER — LORAZEPAM 2 MG/ML
1 INJECTION INTRAMUSCULAR
Status: DISCONTINUED | OUTPATIENT
Start: 2020-01-01 | End: 2020-01-01 | Stop reason: HOSPADM

## 2020-01-01 RX ORDER — FAMOTIDINE 20 MG/1
20 TABLET, FILM COATED ORAL 2 TIMES DAILY
Status: DISCONTINUED | OUTPATIENT
Start: 2020-01-01 | End: 2020-01-01

## 2020-01-01 RX ORDER — AMOXICILLIN 250 MG
2 CAPSULE ORAL
Status: DISCONTINUED | OUTPATIENT
Start: 2020-01-01 | End: 2020-01-01 | Stop reason: HOSPADM

## 2020-01-01 RX ORDER — INSULIN LISPRO 100 [IU]/ML
INJECTION, SOLUTION INTRAVENOUS; SUBCUTANEOUS
Status: DISCONTINUED | OUTPATIENT
Start: 2020-01-01 | End: 2020-01-01 | Stop reason: HOSPADM

## 2020-01-01 RX ORDER — GUAIFENESIN 100 MG/5ML
81 LIQUID (ML) ORAL DAILY
Status: DISCONTINUED | OUTPATIENT
Start: 2020-01-01 | End: 2020-01-01

## 2020-01-01 RX ORDER — FUROSEMIDE 10 MG/ML
80 INJECTION INTRAMUSCULAR; INTRAVENOUS
Status: COMPLETED | OUTPATIENT
Start: 2020-01-01 | End: 2020-01-01

## 2020-01-01 RX ORDER — INSULIN GLARGINE 100 [IU]/ML
4 INJECTION, SOLUTION SUBCUTANEOUS
Qty: 1 VIAL | Refills: 0 | Status: SHIPPED
Start: 2020-01-01

## 2020-01-01 RX ORDER — DEXTROSE 50 % IN WATER (D50W) INTRAVENOUS SYRINGE
25
Status: COMPLETED | OUTPATIENT
Start: 2020-01-01 | End: 2020-01-01

## 2020-01-01 RX ORDER — ACETAMINOPHEN 325 MG/1
650 TABLET ORAL
Status: DISCONTINUED | OUTPATIENT
Start: 2020-01-01 | End: 2020-01-01 | Stop reason: HOSPADM

## 2020-01-01 RX ORDER — PROPOFOL 10 MG/ML
0-50 VIAL (ML) INTRAVENOUS
Status: DISCONTINUED | OUTPATIENT
Start: 2020-01-01 | End: 2020-01-01

## 2020-01-01 RX ORDER — FUROSEMIDE 10 MG/ML
INJECTION INTRAMUSCULAR; INTRAVENOUS
Status: COMPLETED
Start: 2020-01-01 | End: 2020-01-01

## 2020-01-01 RX ORDER — FUROSEMIDE 10 MG/ML
40 INJECTION INTRAMUSCULAR; INTRAVENOUS ONCE
Status: COMPLETED | OUTPATIENT
Start: 2020-01-01 | End: 2020-01-01

## 2020-01-01 RX ORDER — FUROSEMIDE 20 MG/1
20 TABLET ORAL DAILY
Qty: 30 TAB | Refills: 0 | Status: SHIPPED | OUTPATIENT
Start: 2020-01-01

## 2020-01-01 RX ORDER — CHOLECALCIFEROL (VITAMIN D3) 125 MCG
5000 CAPSULE ORAL DAILY
Status: DISCONTINUED | OUTPATIENT
Start: 2020-01-01 | End: 2020-01-01 | Stop reason: HOSPADM

## 2020-01-01 RX ORDER — ONDANSETRON 2 MG/ML
4 INJECTION INTRAMUSCULAR; INTRAVENOUS
Status: CANCELLED | OUTPATIENT
Start: 2020-01-01

## 2020-01-01 RX ORDER — HALOPERIDOL 5 MG/ML
2 INJECTION INTRAMUSCULAR
Status: CANCELLED | OUTPATIENT
Start: 2020-01-01

## 2020-01-01 RX ORDER — FAMOTIDINE 20 MG/1
20 TABLET, FILM COATED ORAL DAILY
Status: DISCONTINUED | OUTPATIENT
Start: 2020-01-01 | End: 2020-01-01 | Stop reason: HOSPADM

## 2020-01-01 RX ORDER — HYDRALAZINE HYDROCHLORIDE 20 MG/ML
10 INJECTION INTRAMUSCULAR; INTRAVENOUS
Status: DISCONTINUED | OUTPATIENT
Start: 2020-01-01 | End: 2020-01-01 | Stop reason: HOSPADM

## 2020-01-01 RX ORDER — INSULIN GLARGINE 100 [IU]/ML
5 INJECTION, SOLUTION SUBCUTANEOUS DAILY
Status: DISCONTINUED | OUTPATIENT
Start: 2020-01-01 | End: 2020-01-01 | Stop reason: HOSPADM

## 2020-01-01 RX ORDER — CHLORHEXIDINE GLUCONATE 1.2 MG/ML
10 RINSE ORAL EVERY 12 HOURS
Status: CANCELLED | OUTPATIENT
Start: 2020-01-01

## 2020-01-01 RX ORDER — SODIUM CHLORIDE 0.9 % (FLUSH) 0.9 %
5-40 SYRINGE (ML) INJECTION EVERY 8 HOURS
Status: DISCONTINUED | OUTPATIENT
Start: 2020-01-01 | End: 2020-01-01 | Stop reason: HOSPADM

## 2020-01-01 RX ORDER — FAMOTIDINE 20 MG/1
20 TABLET, FILM COATED ORAL DAILY
Status: DISCONTINUED | OUTPATIENT
Start: 2020-01-01 | End: 2020-01-01

## 2020-01-01 RX ORDER — PANTOPRAZOLE SODIUM 40 MG/1
40 TABLET, DELAYED RELEASE ORAL DAILY
Status: DISCONTINUED | OUTPATIENT
Start: 2020-01-01 | End: 2020-01-01 | Stop reason: HOSPADM

## 2020-01-01 RX ORDER — FUROSEMIDE 10 MG/ML
40 INJECTION INTRAMUSCULAR; INTRAVENOUS
Status: COMPLETED | OUTPATIENT
Start: 2020-01-01 | End: 2020-01-01

## 2020-01-01 RX ORDER — PROMETHAZINE HYDROCHLORIDE 25 MG/1
12.5 TABLET ORAL
Status: DISCONTINUED | OUTPATIENT
Start: 2020-01-01 | End: 2020-01-01

## 2020-01-01 RX ORDER — AMIODARONE HYDROCHLORIDE 200 MG/1
200 TABLET ORAL
Status: COMPLETED | OUTPATIENT
Start: 2020-01-01 | End: 2020-01-01

## 2020-01-01 RX ORDER — MIDAZOLAM HYDROCHLORIDE 5 MG/ML
2.5 INJECTION INTRAMUSCULAR; INTRAVENOUS
Status: CANCELLED | OUTPATIENT
Start: 2020-01-01

## 2020-01-01 RX ORDER — MIDAZOLAM HYDROCHLORIDE 1 MG/ML
2 INJECTION, SOLUTION INTRAMUSCULAR; INTRAVENOUS ONCE
Status: COMPLETED | OUTPATIENT
Start: 2020-01-01 | End: 2020-01-01

## 2020-01-01 RX ORDER — ALBUMIN HUMAN 50 G/1000ML
12.5 SOLUTION INTRAVENOUS ONCE
Status: COMPLETED | OUTPATIENT
Start: 2020-01-01 | End: 2020-01-01

## 2020-01-01 RX ORDER — INFANT FORMULA WITH IRON
POWDER (GRAM) ORAL 2 TIMES DAILY
Status: DISCONTINUED | OUTPATIENT
Start: 2020-01-01 | End: 2020-01-01 | Stop reason: HOSPADM

## 2020-01-01 RX ORDER — LABETALOL HCL 20 MG/4 ML
5 SYRINGE (ML) INTRAVENOUS
Status: DISCONTINUED | OUTPATIENT
Start: 2020-01-01 | End: 2020-01-01 | Stop reason: HOSPADM

## 2020-01-01 RX ORDER — DEXTROSE MONOHYDRATE 100 MG/ML
10 INJECTION, SOLUTION INTRAVENOUS CONTINUOUS
Status: DISCONTINUED | OUTPATIENT
Start: 2020-01-01 | End: 2020-01-01

## 2020-01-01 RX ORDER — LANOLIN ALCOHOL/MO/W.PET/CERES
1 CREAM (GRAM) TOPICAL
Status: DISCONTINUED | OUTPATIENT
Start: 2020-01-01 | End: 2020-01-01 | Stop reason: HOSPADM

## 2020-01-01 RX ORDER — HALOPERIDOL 2 MG/ML
2 SOLUTION ORAL
Status: CANCELLED | OUTPATIENT
Start: 2020-01-01

## 2020-01-01 RX ORDER — DEXTROSE MONOHYDRATE 100 MG/ML
125-250 INJECTION, SOLUTION INTRAVENOUS AS NEEDED
Status: DISCONTINUED | OUTPATIENT
Start: 2020-01-01 | End: 2020-01-01 | Stop reason: HOSPADM

## 2020-01-01 RX ORDER — MAGNESIUM SULFATE 100 %
4 CRYSTALS MISCELLANEOUS AS NEEDED
Status: DISCONTINUED | OUTPATIENT
Start: 2020-01-01 | End: 2020-01-01 | Stop reason: HOSPADM

## 2020-01-01 RX ORDER — ACETAMINOPHEN 325 MG/1
650 TABLET ORAL
Qty: 30 TAB | Refills: 0 | Status: SHIPPED
Start: 2020-01-01

## 2020-01-01 RX ORDER — MAGNESIUM SULFATE 1 G/100ML
1 INJECTION INTRAVENOUS ONCE
Status: COMPLETED | OUTPATIENT
Start: 2020-01-01 | End: 2020-01-01

## 2020-01-01 RX ORDER — POTASSIUM CHLORIDE 7.45 MG/ML
10 INJECTION INTRAVENOUS
Status: COMPLETED | OUTPATIENT
Start: 2020-01-01 | End: 2020-01-01

## 2020-01-01 RX ORDER — MORPHINE SULFATE 2 MG/ML
2 INJECTION, SOLUTION INTRAMUSCULAR; INTRAVENOUS
Status: DISCONTINUED | OUTPATIENT
Start: 2020-01-01 | End: 2020-10-23 | Stop reason: HOSPADM

## 2020-01-01 RX ORDER — MORPHINE SULFATE 4 MG/ML
4 INJECTION, SOLUTION INTRAMUSCULAR; INTRAVENOUS
Status: DISCONTINUED | OUTPATIENT
Start: 2020-01-01 | End: 2020-01-01

## 2020-01-01 RX ORDER — METOPROLOL TARTRATE 5 MG/5ML
5 INJECTION INTRAVENOUS
Status: COMPLETED | OUTPATIENT
Start: 2020-01-01 | End: 2020-01-01

## 2020-01-01 RX ORDER — MIDAZOLAM HYDROCHLORIDE 1 MG/ML
2 INJECTION, SOLUTION INTRAMUSCULAR; INTRAVENOUS DAILY PRN
Status: CANCELLED | OUTPATIENT
Start: 2020-01-01

## 2020-01-01 RX ORDER — PROPOFOL 10 MG/ML
VIAL (ML) INTRAVENOUS
Status: DISCONTINUED | OUTPATIENT
Start: 2020-01-01 | End: 2020-01-01 | Stop reason: HOSPADM

## 2020-01-01 RX ORDER — ATORVASTATIN CALCIUM 40 MG/1
80 TABLET, FILM COATED ORAL
Status: DISCONTINUED | OUTPATIENT
Start: 2020-01-01 | End: 2020-01-01 | Stop reason: HOSPADM

## 2020-01-01 RX ORDER — SODIUM CHLORIDE 0.9 % (FLUSH) 0.9 %
5-40 SYRINGE (ML) INJECTION AS NEEDED
Status: DISCONTINUED | OUTPATIENT
Start: 2020-01-01 | End: 2020-01-01 | Stop reason: HOSPADM

## 2020-01-01 RX ORDER — MIDAZOLAM HYDROCHLORIDE 1 MG/ML
INJECTION, SOLUTION INTRAMUSCULAR; INTRAVENOUS
Status: COMPLETED
Start: 2020-01-01 | End: 2020-01-01

## 2020-01-01 RX ORDER — FENTANYL CITRATE 50 UG/ML
50-100 INJECTION, SOLUTION INTRAMUSCULAR; INTRAVENOUS
Status: CANCELLED | OUTPATIENT
Start: 2020-01-01

## 2020-01-01 RX ORDER — ONDANSETRON 2 MG/ML
4 INJECTION INTRAMUSCULAR; INTRAVENOUS
Status: DISCONTINUED | OUTPATIENT
Start: 2020-01-01 | End: 2020-10-23 | Stop reason: HOSPADM

## 2020-01-01 RX ORDER — ATORVASTATIN CALCIUM 40 MG/1
40 TABLET, FILM COATED ORAL
Status: DISCONTINUED | OUTPATIENT
Start: 2020-01-01 | End: 2020-01-01 | Stop reason: HOSPADM

## 2020-01-01 RX ORDER — FENTANYL CITRATE 50 UG/ML
100 INJECTION, SOLUTION INTRAMUSCULAR; INTRAVENOUS
Status: DISCONTINUED | OUTPATIENT
Start: 2020-01-01 | End: 2020-01-01 | Stop reason: SDUPTHER

## 2020-01-01 RX ORDER — ERYTHROMYCIN 5 MG/G
OINTMENT OPHTHALMIC EVERY 4 HOURS
Status: DISCONTINUED | OUTPATIENT
Start: 2020-01-01 | End: 2020-01-01 | Stop reason: HOSPADM

## 2020-01-01 RX ORDER — SODIUM CHLORIDE 9 MG/ML
125 INJECTION, SOLUTION INTRAVENOUS CONTINUOUS
Status: DISCONTINUED | OUTPATIENT
Start: 2020-01-01 | End: 2020-01-01

## 2020-01-01 RX ORDER — POLYETHYLENE GLYCOL 3350 17 G/17G
17 POWDER, FOR SOLUTION ORAL DAILY PRN
Status: DISCONTINUED | OUTPATIENT
Start: 2020-01-01 | End: 2020-01-01

## 2020-01-01 RX ORDER — KETAMINE HCL 50MG/ML(1)
150 SYRINGE (ML) INTRAVENOUS ONCE
Status: COMPLETED | OUTPATIENT
Start: 2020-01-01 | End: 2020-01-01

## 2020-01-01 RX ORDER — ATORVASTATIN CALCIUM 40 MG/1
80 TABLET, FILM COATED ORAL
Status: DISCONTINUED | OUTPATIENT
Start: 2020-01-01 | End: 2020-01-01

## 2020-01-01 RX ORDER — SODIUM CHLORIDE 0.9 % (FLUSH) 0.9 %
5-10 SYRINGE (ML) INJECTION AS NEEDED
Status: DISCONTINUED | OUTPATIENT
Start: 2020-01-01 | End: 2020-01-01 | Stop reason: HOSPADM

## 2020-01-01 RX ORDER — MORPHINE SULFATE 2 MG/ML
2 INJECTION, SOLUTION INTRAMUSCULAR; INTRAVENOUS
Status: CANCELLED | OUTPATIENT
Start: 2020-01-01

## 2020-01-01 RX ORDER — VITAMIN E 1000 UNIT
1000 CAPSULE ORAL 2 TIMES DAILY
Status: DISCONTINUED | OUTPATIENT
Start: 2020-01-01 | End: 2020-01-01 | Stop reason: HOSPADM

## 2020-01-01 RX ORDER — SODIUM CHLORIDE 0.9 % (FLUSH) 0.9 %
5-40 SYRINGE (ML) INJECTION EVERY 8 HOURS
Status: DISCONTINUED | OUTPATIENT
Start: 2020-01-01 | End: 2020-01-01

## 2020-01-01 RX ORDER — POTASSIUM CHLORIDE 20 MEQ/1
20 TABLET, EXTENDED RELEASE ORAL ONCE
Status: COMPLETED | OUTPATIENT
Start: 2020-01-01 | End: 2020-01-01

## 2020-01-01 RX ORDER — FUROSEMIDE 10 MG/ML
40 INJECTION INTRAMUSCULAR; INTRAVENOUS DAILY
Status: DISCONTINUED | OUTPATIENT
Start: 2020-01-01 | End: 2020-01-01

## 2020-01-01 RX ORDER — GLYCOPYRROLATE 0.2 MG/ML
0.2 INJECTION INTRAMUSCULAR; INTRAVENOUS
Status: DISCONTINUED | OUTPATIENT
Start: 2020-01-01 | End: 2020-10-23 | Stop reason: HOSPADM

## 2020-01-01 RX ORDER — SODIUM CHLORIDE 9 MG/ML
10 INJECTION INTRAMUSCULAR; INTRAVENOUS; SUBCUTANEOUS
Status: COMPLETED | OUTPATIENT
Start: 2020-01-01 | End: 2020-01-01

## 2020-01-01 RX ORDER — PERMETHRIN 50 MG/G
CREAM TOPICAL
Status: COMPLETED | OUTPATIENT
Start: 2020-01-01 | End: 2020-01-01

## 2020-01-01 RX ORDER — FAMOTIDINE 20 MG/1
20 TABLET, FILM COATED ORAL 2 TIMES DAILY
Status: DISCONTINUED | OUTPATIENT
Start: 2020-01-01 | End: 2020-01-01 | Stop reason: DRUGHIGH

## 2020-01-01 RX ORDER — ALBUMIN HUMAN 250 G/1000ML
25 SOLUTION INTRAVENOUS ONCE
Status: COMPLETED | OUTPATIENT
Start: 2020-01-01 | End: 2020-01-01

## 2020-01-01 RX ORDER — HEPARIN SODIUM 5000 [USP'U]/ML
5000 INJECTION, SOLUTION INTRAVENOUS; SUBCUTANEOUS EVERY 12 HOURS
Status: DISCONTINUED | OUTPATIENT
Start: 2020-01-01 | End: 2020-01-01 | Stop reason: SDUPTHER

## 2020-01-01 RX ORDER — METOPROLOL TARTRATE 50 MG/1
50 TABLET ORAL
Status: COMPLETED | OUTPATIENT
Start: 2020-01-01 | End: 2020-01-01

## 2020-01-01 RX ORDER — CARVEDILOL 6.25 MG/1
6.25 TABLET ORAL 2 TIMES DAILY WITH MEALS
Status: DISCONTINUED | OUTPATIENT
Start: 2020-01-01 | End: 2020-01-01 | Stop reason: HOSPADM

## 2020-01-01 RX ORDER — ONDANSETRON 2 MG/ML
4 INJECTION INTRAMUSCULAR; INTRAVENOUS
Status: DISCONTINUED | OUTPATIENT
Start: 2020-01-01 | End: 2020-01-01 | Stop reason: HOSPADM

## 2020-01-01 RX ORDER — FUROSEMIDE 10 MG/ML
20 INJECTION INTRAMUSCULAR; INTRAVENOUS ONCE
Status: COMPLETED | OUTPATIENT
Start: 2020-01-01 | End: 2020-01-01

## 2020-01-01 RX ORDER — ACETAMINOPHEN 650 MG/1
650 SUPPOSITORY RECTAL
Status: DISCONTINUED | OUTPATIENT
Start: 2020-01-01 | End: 2020-01-01 | Stop reason: HOSPADM

## 2020-01-01 RX ORDER — LIDOCAINE HYDROCHLORIDE 20 MG/ML
INJECTION, SOLUTION EPIDURAL; INFILTRATION; INTRACAUDAL; PERINEURAL AS NEEDED
Status: DISCONTINUED | OUTPATIENT
Start: 2020-01-01 | End: 2020-01-01 | Stop reason: HOSPADM

## 2020-01-01 RX ORDER — SODIUM CHLORIDE 0.9 % (FLUSH) 0.9 %
5-40 SYRINGE (ML) INJECTION AS NEEDED
Status: DISCONTINUED | OUTPATIENT
Start: 2020-01-01 | End: 2020-10-23 | Stop reason: HOSPADM

## 2020-01-01 RX ORDER — MIDAZOLAM HYDROCHLORIDE 1 MG/ML
2 INJECTION, SOLUTION INTRAMUSCULAR; INTRAVENOUS DAILY PRN
Status: DISCONTINUED | OUTPATIENT
Start: 2020-01-01 | End: 2020-01-01

## 2020-01-01 RX ORDER — CARVEDILOL 6.25 MG/1
6.25 TABLET ORAL 2 TIMES DAILY
Status: DISCONTINUED | OUTPATIENT
Start: 2020-01-01 | End: 2020-01-01 | Stop reason: HOSPADM

## 2020-01-01 RX ORDER — LEVOTHYROXINE SODIUM ANHYDROUS 200 UG/5ML
100 INJECTION, POWDER, LYOPHILIZED, FOR SOLUTION INTRAVENOUS DAILY
Status: DISCONTINUED | OUTPATIENT
Start: 2020-01-01 | End: 2020-01-01

## 2020-01-01 RX ORDER — DEXTROSE 50 % IN WATER (D50W) INTRAVENOUS SYRINGE
25-50 AS NEEDED
Status: DISCONTINUED | OUTPATIENT
Start: 2020-01-01 | End: 2020-01-01

## 2020-01-01 RX ORDER — SODIUM CHLORIDE 9 MG/ML
250 INJECTION, SOLUTION INTRAVENOUS ONCE
Status: COMPLETED | OUTPATIENT
Start: 2020-01-01 | End: 2020-01-01

## 2020-01-01 RX ORDER — LANOLIN ALCOHOL/MO/W.PET/CERES
100 CREAM (GRAM) TOPICAL DAILY
Qty: 30 TAB | Refills: 0 | Status: SHIPPED
Start: 2020-01-01 | End: 2020-01-01

## 2020-01-01 RX ORDER — ACETAMINOPHEN 325 MG/1
650 TABLET ORAL
Status: DISCONTINUED | OUTPATIENT
Start: 2020-01-01 | End: 2020-01-01 | Stop reason: SDUPTHER

## 2020-01-01 RX ORDER — MIDAZOLAM HYDROCHLORIDE 1 MG/ML
1 INJECTION, SOLUTION INTRAMUSCULAR; INTRAVENOUS ONCE
Status: COMPLETED | OUTPATIENT
Start: 2020-01-01 | End: 2020-01-01

## 2020-01-01 RX ORDER — SODIUM CHLORIDE 0.9 % (FLUSH) 0.9 %
5-10 SYRINGE (ML) INJECTION AS NEEDED
Status: DISCONTINUED | OUTPATIENT
Start: 2020-01-01 | End: 2020-01-01

## 2020-01-01 RX ORDER — CARVEDILOL 6.25 MG/1
6.25 TABLET ORAL 2 TIMES DAILY
Status: DISCONTINUED | OUTPATIENT
Start: 2020-01-01 | End: 2020-01-01

## 2020-01-01 RX ORDER — POLYETHYLENE GLYCOL 3350 17 G/17G
17 POWDER, FOR SOLUTION ORAL DAILY PRN
Status: DISCONTINUED | OUTPATIENT
Start: 2020-01-01 | End: 2020-01-01 | Stop reason: HOSPADM

## 2020-01-01 RX ORDER — LORAZEPAM 2 MG/ML
1 INJECTION INTRAMUSCULAR ONCE
Status: COMPLETED | OUTPATIENT
Start: 2020-01-01 | End: 2020-01-01

## 2020-01-01 RX ORDER — LORAZEPAM 2 MG/ML
1 INJECTION INTRAMUSCULAR
Status: CANCELLED | OUTPATIENT
Start: 2020-01-01

## 2020-01-01 RX ORDER — DEXTROSE 50 % IN WATER (D50W) INTRAVENOUS SYRINGE
25-50 AS NEEDED
Status: DISCONTINUED | OUTPATIENT
Start: 2020-01-01 | End: 2020-01-01 | Stop reason: HOSPADM

## 2020-01-01 RX ORDER — LEVOTHYROXINE SODIUM 100 UG/1
100 TABLET ORAL
Status: DISCONTINUED | OUTPATIENT
Start: 2020-01-01 | End: 2020-01-01 | Stop reason: HOSPADM

## 2020-01-01 RX ORDER — CARVEDILOL 3.12 MG/1
3.12 TABLET ORAL 2 TIMES DAILY
Status: DISCONTINUED | OUTPATIENT
Start: 2020-01-01 | End: 2020-01-01

## 2020-01-01 RX ORDER — INSULIN LISPRO 100 [IU]/ML
INJECTION, SOLUTION INTRAVENOUS; SUBCUTANEOUS
Qty: 1 VIAL | Refills: 0 | Status: SHIPPED
Start: 2020-01-01

## 2020-01-01 RX ORDER — ACETAMINOPHEN 650 MG/1
650 SUPPOSITORY RECTAL
Status: DISCONTINUED | OUTPATIENT
Start: 2020-01-01 | End: 2020-01-01

## 2020-01-01 RX ORDER — ROCURONIUM BROMIDE 10 MG/ML
62 INJECTION, SOLUTION INTRAVENOUS
Status: COMPLETED | OUTPATIENT
Start: 2020-01-01 | End: 2020-01-01

## 2020-01-01 RX ORDER — AZITHROMYCIN 500 MG/1
500 TABLET, FILM COATED ORAL DAILY
Qty: 2 TAB | Refills: 0 | Status: SHIPPED | OUTPATIENT
Start: 2020-01-01 | End: 2020-01-01

## 2020-01-01 RX ORDER — PROPOFOL 10 MG/ML
10 VIAL (ML) INTRAVENOUS
Status: DISCONTINUED | OUTPATIENT
Start: 2020-01-01 | End: 2020-01-01

## 2020-01-01 RX ORDER — ONDANSETRON 2 MG/ML
4 INJECTION INTRAMUSCULAR; INTRAVENOUS
Status: DISCONTINUED | OUTPATIENT
Start: 2020-01-01 | End: 2020-01-01

## 2020-01-01 RX ORDER — MIDAZOLAM HYDROCHLORIDE 1 MG/ML
1-2 INJECTION, SOLUTION INTRAMUSCULAR; INTRAVENOUS
Status: DISCONTINUED | OUTPATIENT
Start: 2020-01-01 | End: 2020-01-01

## 2020-01-01 RX ORDER — LEVOTHYROXINE SODIUM 100 UG/1
100 TABLET ORAL
Status: DISCONTINUED | OUTPATIENT
Start: 2020-01-01 | End: 2020-10-23 | Stop reason: HOSPADM

## 2020-01-01 RX ORDER — LORAZEPAM 2 MG/ML
1 INJECTION INTRAMUSCULAR
Status: DISCONTINUED | OUTPATIENT
Start: 2020-01-01 | End: 2020-10-23 | Stop reason: HOSPADM

## 2020-01-01 RX ORDER — CARVEDILOL 12.5 MG/1
12.5 TABLET ORAL 2 TIMES DAILY WITH MEALS
Status: DISCONTINUED | OUTPATIENT
Start: 2020-01-01 | End: 2020-01-01

## 2020-01-01 RX ORDER — ATORVASTATIN CALCIUM 80 MG/1
80 TABLET, FILM COATED ORAL
Qty: 30 TAB | Refills: 0 | Status: SHIPPED
Start: 2020-01-01

## 2020-01-01 RX ORDER — KETAMINE HCL 50MG/ML(1)
SYRINGE (ML) INTRAVENOUS
Status: COMPLETED
Start: 2020-01-01 | End: 2020-01-01

## 2020-01-01 RX ADMIN — Medication: at 18:10

## 2020-01-01 RX ADMIN — Medication 10 ML: at 14:33

## 2020-01-01 RX ADMIN — LORAZEPAM 1 MG: 2 INJECTION INTRAMUSCULAR; INTRAVENOUS at 00:35

## 2020-01-01 RX ADMIN — Medication 1000 MG: at 18:26

## 2020-01-01 RX ADMIN — AZITHROMYCIN MONOHYDRATE 500 MG: 500 INJECTION, POWDER, LYOPHILIZED, FOR SOLUTION INTRAVENOUS at 22:45

## 2020-01-01 RX ADMIN — Medication 5000 UNITS: at 10:28

## 2020-01-01 RX ADMIN — Medication 5000 UNITS: at 09:46

## 2020-01-01 RX ADMIN — ERYTHROMYCIN: 5 OINTMENT OPHTHALMIC at 04:43

## 2020-01-01 RX ADMIN — APIXABAN 2.5 MG: 2.5 TABLET, FILM COATED ORAL at 18:09

## 2020-01-01 RX ADMIN — LEVOTHYROXINE SODIUM 100 MCG: 100 TABLET ORAL at 05:46

## 2020-01-01 RX ADMIN — ERYTHROMYCIN: 5 OINTMENT OPHTHALMIC at 21:12

## 2020-01-01 RX ADMIN — INSULIN LISPRO 6 UNITS: 100 INJECTION, SOLUTION INTRAVENOUS; SUBCUTANEOUS at 10:31

## 2020-01-01 RX ADMIN — LIDOCAINE HYDROCHLORIDE 20 MG: 20 INJECTION, SOLUTION EPIDURAL; INFILTRATION; INTRACAUDAL; PERINEURAL at 15:49

## 2020-01-01 RX ADMIN — ATORVASTATIN CALCIUM 80 MG: 40 TABLET, FILM COATED ORAL at 23:38

## 2020-01-01 RX ADMIN — Medication 1000 MG: at 16:56

## 2020-01-01 RX ADMIN — APIXABAN 2.5 MG: 2.5 TABLET, FILM COATED ORAL at 11:50

## 2020-01-01 RX ADMIN — INSULIN LISPRO 6 UNITS: 100 INJECTION, SOLUTION INTRAVENOUS; SUBCUTANEOUS at 12:46

## 2020-01-01 RX ADMIN — CARVEDILOL 6.25 MG: 6.25 TABLET, FILM COATED ORAL at 17:21

## 2020-01-01 RX ADMIN — FERROUS SULFATE TAB 325 MG (65 MG ELEMENTAL FE) 325 MG: 325 (65 FE) TAB at 11:50

## 2020-01-01 RX ADMIN — HEPARIN SODIUM 4020 UNITS: 1000 INJECTION, SOLUTION INTRAVENOUS; SUBCUTANEOUS at 04:14

## 2020-01-01 RX ADMIN — APIXABAN 2.5 MG: 2.5 TABLET, FILM COATED ORAL at 17:40

## 2020-01-01 RX ADMIN — INSULIN GLARGINE 5 UNITS: 100 INJECTION, SOLUTION SUBCUTANEOUS at 08:00

## 2020-01-01 RX ADMIN — Medication 10 ML: at 14:25

## 2020-01-01 RX ADMIN — POLYETHYLENE GLYCOL 3350 17 G: 17 POWDER, FOR SOLUTION ORAL at 16:16

## 2020-01-01 RX ADMIN — FUROSEMIDE 40 MG: 10 INJECTION, SOLUTION INTRAMUSCULAR; INTRAVENOUS at 11:30

## 2020-01-01 RX ADMIN — ERYTHROMYCIN: 5 OINTMENT OPHTHALMIC at 05:00

## 2020-01-01 RX ADMIN — APIXABAN 2.5 MG: 2.5 TABLET, FILM COATED ORAL at 10:22

## 2020-01-01 RX ADMIN — Medication 150 MG: at 08:28

## 2020-01-01 RX ADMIN — FENTANYL CITRATE 100 MCG: 50 INJECTION, SOLUTION INTRAMUSCULAR; INTRAVENOUS at 05:05

## 2020-01-01 RX ADMIN — ATORVASTATIN CALCIUM 80 MG: 40 TABLET, FILM COATED ORAL at 22:59

## 2020-01-01 RX ADMIN — LEVOTHYROXINE SODIUM 100 MCG: 100 TABLET ORAL at 06:41

## 2020-01-01 RX ADMIN — DEXMEDETOMIDINE HYDROCHLORIDE 2 MCG: 100 INJECTION, SOLUTION, CONCENTRATE INTRAVENOUS at 15:46

## 2020-01-01 RX ADMIN — AMIODARONE HYDROCHLORIDE 0.5 MG/MIN: 1.8 INJECTION, SOLUTION INTRAVENOUS at 11:54

## 2020-01-01 RX ADMIN — Medication 100 MG: at 09:39

## 2020-01-01 RX ADMIN — ERYTHROMYCIN: 5 OINTMENT OPHTHALMIC at 23:47

## 2020-01-01 RX ADMIN — CHLORHEXIDINE GLUCONATE 0.12% ORAL RINSE 10 ML: 1.2 LIQUID ORAL at 22:18

## 2020-01-01 RX ADMIN — APIXABAN 2.5 MG: 2.5 TABLET, FILM COATED ORAL at 17:21

## 2020-01-01 RX ADMIN — INSULIN LISPRO 9 UNITS: 100 INJECTION, SOLUTION INTRAVENOUS; SUBCUTANEOUS at 11:38

## 2020-01-01 RX ADMIN — INSULIN GLARGINE 5 UNITS: 100 INJECTION, SOLUTION SUBCUTANEOUS at 10:42

## 2020-01-01 RX ADMIN — CARVEDILOL 3.12 MG: 3.12 TABLET, FILM COATED ORAL at 18:27

## 2020-01-01 RX ADMIN — FENTANYL CITRATE 50 MCG: 50 INJECTION, SOLUTION INTRAMUSCULAR; INTRAVENOUS at 10:30

## 2020-01-01 RX ADMIN — INSULIN LISPRO 3 UNITS: 100 INJECTION, SOLUTION INTRAVENOUS; SUBCUTANEOUS at 13:34

## 2020-01-01 RX ADMIN — FERROUS SULFATE TAB 325 MG (65 MG ELEMENTAL FE) 325 MG: 325 (65 FE) TAB at 10:40

## 2020-01-01 RX ADMIN — DEXTROSE 50 % IN WATER (D50W) INTRAVENOUS SYRINGE 25 G: at 16:01

## 2020-01-01 RX ADMIN — POTASSIUM CHLORIDE 10 MEQ: 7.46 INJECTION, SOLUTION INTRAVENOUS at 11:00

## 2020-01-01 RX ADMIN — INSULIN LISPRO 3 UNITS: 100 INJECTION, SOLUTION INTRAVENOUS; SUBCUTANEOUS at 13:37

## 2020-01-01 RX ADMIN — INSULIN LISPRO 2 UNITS: 100 INJECTION, SOLUTION INTRAVENOUS; SUBCUTANEOUS at 17:54

## 2020-01-01 RX ADMIN — FENTANYL CITRATE 100 MCG: 50 INJECTION, SOLUTION INTRAMUSCULAR; INTRAVENOUS at 03:29

## 2020-01-01 RX ADMIN — ERYTHROMYCIN: 5 OINTMENT OPHTHALMIC at 00:58

## 2020-01-01 RX ADMIN — POTASSIUM CHLORIDE 10 MEQ: 7.46 INJECTION, SOLUTION INTRAVENOUS at 08:54

## 2020-01-01 RX ADMIN — Medication: at 18:00

## 2020-01-01 RX ADMIN — INSULIN GLARGINE 5 UNITS: 100 INJECTION, SOLUTION SUBCUTANEOUS at 10:31

## 2020-01-01 RX ADMIN — Medication 100 MG: at 09:40

## 2020-01-01 RX ADMIN — ERYTHROMYCIN: 5 OINTMENT OPHTHALMIC at 12:45

## 2020-01-01 RX ADMIN — Medication 100 MG: at 10:40

## 2020-01-01 RX ADMIN — CARVEDILOL 6.25 MG: 6.25 TABLET, FILM COATED ORAL at 17:01

## 2020-01-01 RX ADMIN — APIXABAN 2.5 MG: 2.5 TABLET, FILM COATED ORAL at 17:58

## 2020-01-01 RX ADMIN — Medication 5000 UNITS: at 11:50

## 2020-01-01 RX ADMIN — LORAZEPAM 1 MG: 2 INJECTION INTRAMUSCULAR; INTRAVENOUS at 21:01

## 2020-01-01 RX ADMIN — ERYTHROMYCIN: 5 OINTMENT OPHTHALMIC at 12:19

## 2020-01-01 RX ADMIN — DEXMEDETOMIDINE HYDROCHLORIDE 2 MCG: 100 INJECTION, SOLUTION, CONCENTRATE INTRAVENOUS at 15:47

## 2020-01-01 RX ADMIN — DIGOXIN 250 MCG: 0.25 INJECTION INTRAMUSCULAR; INTRAVENOUS at 02:22

## 2020-01-01 RX ADMIN — CARVEDILOL 6.25 MG: 6.25 TABLET, FILM COATED ORAL at 22:30

## 2020-01-01 RX ADMIN — CEFTRIAXONE SODIUM 1 G: 1 INJECTION, POWDER, FOR SOLUTION INTRAMUSCULAR; INTRAVENOUS at 05:53

## 2020-01-01 RX ADMIN — ERYTHROMYCIN: 5 OINTMENT OPHTHALMIC at 12:30

## 2020-01-01 RX ADMIN — Medication 1000 MG: at 18:09

## 2020-01-01 RX ADMIN — ASPIRIN 81 MG CHEWABLE TABLET 324 MG: 81 TABLET CHEWABLE at 03:55

## 2020-01-01 RX ADMIN — LEVOTHYROXINE SODIUM 100 MCG: 100 TABLET ORAL at 06:20

## 2020-01-01 RX ADMIN — Medication 5000 UNITS: at 11:02

## 2020-01-01 RX ADMIN — MIDAZOLAM HYDROCHLORIDE 1 MG: 2 INJECTION, SOLUTION INTRAMUSCULAR; INTRAVENOUS at 00:39

## 2020-01-01 RX ADMIN — ATORVASTATIN CALCIUM 40 MG: 40 TABLET, FILM COATED ORAL at 21:46

## 2020-01-01 RX ADMIN — LEVOTHYROXINE SODIUM 100 MCG: 100 TABLET ORAL at 06:43

## 2020-01-01 RX ADMIN — INSULIN LISPRO 6 UNITS: 100 INJECTION, SOLUTION INTRAVENOUS; SUBCUTANEOUS at 16:16

## 2020-01-01 RX ADMIN — ERYTHROMYCIN: 5 OINTMENT OPHTHALMIC at 09:00

## 2020-01-01 RX ADMIN — FERROUS SULFATE TAB 325 MG (65 MG ELEMENTAL FE) 325 MG: 325 (65 FE) TAB at 10:28

## 2020-01-01 RX ADMIN — INSULIN LISPRO 4 UNITS: 100 INJECTION, SOLUTION INTRAVENOUS; SUBCUTANEOUS at 23:10

## 2020-01-01 RX ADMIN — Medication: at 09:00

## 2020-01-01 RX ADMIN — AZITHROMYCIN MONOHYDRATE 500 MG: 500 INJECTION, POWDER, LYOPHILIZED, FOR SOLUTION INTRAVENOUS at 23:51

## 2020-01-01 RX ADMIN — ERYTHROMYCIN: 5 OINTMENT OPHTHALMIC at 18:27

## 2020-01-01 RX ADMIN — CARVEDILOL 12.5 MG: 12.5 TABLET, FILM COATED ORAL at 10:18

## 2020-01-01 RX ADMIN — LORAZEPAM 1 MG: 2 INJECTION INTRAMUSCULAR; INTRAVENOUS at 23:38

## 2020-01-01 RX ADMIN — Medication: at 17:58

## 2020-01-01 RX ADMIN — DIGOXIN 250 MCG: 0.25 INJECTION INTRAMUSCULAR; INTRAVENOUS at 16:00

## 2020-01-01 RX ADMIN — SODIUM CHLORIDE 975 MG: 900 INJECTION, SOLUTION INTRAVENOUS at 03:04

## 2020-01-01 RX ADMIN — WATER 1 G: 1 INJECTION INTRAMUSCULAR; INTRAVENOUS; SUBCUTANEOUS at 23:52

## 2020-01-01 RX ADMIN — HEPARIN SODIUM 18 UNITS/KG/HR: 10000 INJECTION, SOLUTION INTRAVENOUS at 18:21

## 2020-01-01 RX ADMIN — APIXABAN 2.5 MG: 2.5 TABLET, FILM COATED ORAL at 17:55

## 2020-01-01 RX ADMIN — DEXTROSE MONOHYDRATE 25 G: 25 INJECTION, SOLUTION INTRAVENOUS at 16:01

## 2020-01-01 RX ADMIN — ATORVASTATIN CALCIUM 80 MG: 40 TABLET, FILM COATED ORAL at 03:59

## 2020-01-01 RX ADMIN — INSULIN LISPRO 2 UNITS: 100 INJECTION, SOLUTION INTRAVENOUS; SUBCUTANEOUS at 09:50

## 2020-01-01 RX ADMIN — FERROUS SULFATE TAB 325 MG (65 MG ELEMENTAL FE) 325 MG: 325 (65 FE) TAB at 09:46

## 2020-01-01 RX ADMIN — AMIODARONE HYDROCHLORIDE 0.5 MG/MIN: 1.8 INJECTION, SOLUTION INTRAVENOUS at 09:50

## 2020-01-01 RX ADMIN — LEVOTHYROXINE SODIUM 100 MCG: 100 TABLET ORAL at 06:00

## 2020-01-01 RX ADMIN — APIXABAN 2.5 MG: 2.5 TABLET, FILM COATED ORAL at 16:56

## 2020-01-01 RX ADMIN — ERYTHROMYCIN: 5 OINTMENT OPHTHALMIC at 10:35

## 2020-01-01 RX ADMIN — FENTANYL CITRATE 100 MCG: 50 INJECTION, SOLUTION INTRAMUSCULAR; INTRAVENOUS at 11:57

## 2020-01-01 RX ADMIN — PIPERACILLIN SODIUM AND TAZOBACTAM SODIUM 3.38 G: 3; .375 INJECTION, POWDER, LYOPHILIZED, FOR SOLUTION INTRAVENOUS at 08:49

## 2020-01-01 RX ADMIN — Medication 1000 MG: at 10:29

## 2020-01-01 RX ADMIN — ATORVASTATIN CALCIUM 80 MG: 40 TABLET, FILM COATED ORAL at 22:00

## 2020-01-01 RX ADMIN — SENNOSIDES AND DOCUSATE SODIUM 2 TABLET: 8.6; 5 TABLET ORAL at 21:29

## 2020-01-01 RX ADMIN — AMIODARONE HYDROCHLORIDE 0.5 MG/MIN: 1.8 INJECTION, SOLUTION INTRAVENOUS at 22:19

## 2020-01-01 RX ADMIN — SODIUM CHLORIDE 40 MG: 9 INJECTION, SOLUTION INTRAMUSCULAR; INTRAVENOUS; SUBCUTANEOUS at 08:54

## 2020-01-01 RX ADMIN — ERYTHROMYCIN: 5 OINTMENT OPHTHALMIC at 04:14

## 2020-01-01 RX ADMIN — FUROSEMIDE 20 MG: 10 INJECTION, SOLUTION INTRAMUSCULAR; INTRAVENOUS at 02:09

## 2020-01-01 RX ADMIN — FAMOTIDINE 20 MG: 20 TABLET ORAL at 09:39

## 2020-01-01 RX ADMIN — Medication 10 ML: at 06:00

## 2020-01-01 RX ADMIN — FUROSEMIDE 40 MG: 10 INJECTION, SOLUTION INTRAMUSCULAR; INTRAVENOUS at 11:52

## 2020-01-01 RX ADMIN — AMIODARONE HYDROCHLORIDE 1 MG/MIN: 1.8 INJECTION, SOLUTION INTRAVENOUS at 17:15

## 2020-01-01 RX ADMIN — FAMOTIDINE 20 MG: 20 TABLET ORAL at 10:19

## 2020-01-01 RX ADMIN — AMIODARONE HYDROCHLORIDE 200 MG: 200 TABLET ORAL at 03:53

## 2020-01-01 RX ADMIN — SENNOSIDES AND DOCUSATE SODIUM 2 TABLET: 8.6; 5 TABLET ORAL at 22:14

## 2020-01-01 RX ADMIN — ERYTHROMYCIN: 5 OINTMENT OPHTHALMIC at 13:02

## 2020-01-01 RX ADMIN — HYDRALAZINE HYDROCHLORIDE 10 MG: 20 INJECTION INTRAMUSCULAR; INTRAVENOUS at 04:24

## 2020-01-01 RX ADMIN — APIXABAN 5 MG: 5 TABLET, FILM COATED ORAL at 21:46

## 2020-01-01 RX ADMIN — SODIUM CHLORIDE, SODIUM LACTATE, POTASSIUM CHLORIDE, AND CALCIUM CHLORIDE 500 ML: 600; 310; 30; 20 INJECTION, SOLUTION INTRAVENOUS at 00:25

## 2020-01-01 RX ADMIN — PROPOFOL 20 MCG/KG/MIN: 10 INJECTION, EMULSION INTRAVENOUS at 15:48

## 2020-01-01 RX ADMIN — HEPARIN SODIUM 15 UNITS/KG/HR: 10000 INJECTION, SOLUTION INTRAVENOUS at 11:47

## 2020-01-01 RX ADMIN — SENNOSIDES AND DOCUSATE SODIUM 2 TABLET: 8.6; 5 TABLET ORAL at 22:27

## 2020-01-01 RX ADMIN — ATORVASTATIN CALCIUM 80 MG: 40 TABLET, FILM COATED ORAL at 22:14

## 2020-01-01 RX ADMIN — LORAZEPAM 1 MG: 2 INJECTION INTRAMUSCULAR; INTRAVENOUS at 15:12

## 2020-01-01 RX ADMIN — Medication 10 ML: at 22:00

## 2020-01-01 RX ADMIN — Medication 1000 MG: at 09:40

## 2020-01-01 RX ADMIN — FENTANYL CITRATE 50 MCG: 50 INJECTION, SOLUTION INTRAMUSCULAR; INTRAVENOUS at 10:20

## 2020-01-01 RX ADMIN — SENNOSIDES AND DOCUSATE SODIUM 2 TABLET: 8.6; 5 TABLET ORAL at 23:38

## 2020-01-01 RX ADMIN — MAGNESIUM SULFATE 1 G: 1 INJECTION INTRAVENOUS at 06:42

## 2020-01-01 RX ADMIN — MIDAZOLAM 2 MG: 1 INJECTION INTRAMUSCULAR; INTRAVENOUS at 08:17

## 2020-01-01 RX ADMIN — ATORVASTATIN CALCIUM 80 MG: 40 TABLET, FILM COATED ORAL at 21:33

## 2020-01-01 RX ADMIN — ATORVASTATIN CALCIUM 80 MG: 40 TABLET, FILM COATED ORAL at 21:11

## 2020-01-01 RX ADMIN — ALBUMIN (HUMAN) 12.5 G: 12.5 INJECTION, SOLUTION INTRAVENOUS at 04:56

## 2020-01-01 RX ADMIN — LEVOTHYROXINE SODIUM 100 MCG: 100 TABLET ORAL at 07:13

## 2020-01-01 RX ADMIN — Medication: at 09:54

## 2020-01-01 RX ADMIN — Medication 1000 MG: at 11:50

## 2020-01-01 RX ADMIN — Medication: at 09:53

## 2020-01-01 RX ADMIN — INSULIN LISPRO 4 UNITS: 100 INJECTION, SOLUTION INTRAVENOUS; SUBCUTANEOUS at 12:20

## 2020-01-01 RX ADMIN — ERYTHROMYCIN: 5 OINTMENT OPHTHALMIC at 00:41

## 2020-01-01 RX ADMIN — CARVEDILOL 6.25 MG: 6.25 TABLET, FILM COATED ORAL at 17:57

## 2020-01-01 RX ADMIN — CARVEDILOL 6.25 MG: 6.25 TABLET, FILM COATED ORAL at 11:50

## 2020-01-01 RX ADMIN — INSULIN LISPRO 3 UNITS: 100 INJECTION, SOLUTION INTRAVENOUS; SUBCUTANEOUS at 13:12

## 2020-01-01 RX ADMIN — MILRINONE LACTATE IN DEXTROSE 0.38 MCG/KG/MIN: 200 INJECTION, SOLUTION INTRAVENOUS at 15:58

## 2020-01-01 RX ADMIN — WATER 1 G: 1 INJECTION INTRAMUSCULAR; INTRAVENOUS; SUBCUTANEOUS at 21:46

## 2020-01-01 RX ADMIN — SODIUM CHLORIDE 10 ML: 9 INJECTION INTRAMUSCULAR; INTRAVENOUS; SUBCUTANEOUS at 10:03

## 2020-01-01 RX ADMIN — SODIUM CHLORIDE 500 ML: 9 INJECTION, SOLUTION INTRAVENOUS at 03:58

## 2020-01-01 RX ADMIN — PANTOPRAZOLE SODIUM 40 MG: 40 TABLET, DELAYED RELEASE ORAL at 08:31

## 2020-01-01 RX ADMIN — Medication: at 17:59

## 2020-01-01 RX ADMIN — Medication 100 MG: at 11:50

## 2020-01-01 RX ADMIN — CHLORHEXIDINE GLUCONATE 0.12% ORAL RINSE 10 ML: 1.2 LIQUID ORAL at 10:17

## 2020-01-01 RX ADMIN — INSULIN GLARGINE 4 UNITS: 100 INJECTION, SOLUTION SUBCUTANEOUS at 22:00

## 2020-01-01 RX ADMIN — FAMOTIDINE 20 MG: 20 TABLET ORAL at 10:28

## 2020-01-01 RX ADMIN — FENTANYL CITRATE 100 MCG: 50 INJECTION, SOLUTION INTRAMUSCULAR; INTRAVENOUS at 04:34

## 2020-01-01 RX ADMIN — INSULIN LISPRO 6 UNITS: 100 INJECTION, SOLUTION INTRAVENOUS; SUBCUTANEOUS at 11:58

## 2020-01-01 RX ADMIN — APIXABAN 2.5 MG: 2.5 TABLET, FILM COATED ORAL at 09:46

## 2020-01-01 RX ADMIN — Medication 1000 MG: at 11:01

## 2020-01-01 RX ADMIN — SENNOSIDES AND DOCUSATE SODIUM 2 TABLET: 8.6; 5 TABLET ORAL at 21:26

## 2020-01-01 RX ADMIN — Medication 10 ML: at 22:34

## 2020-01-01 RX ADMIN — ATORVASTATIN CALCIUM 40 MG: 40 TABLET, FILM COATED ORAL at 23:52

## 2020-01-01 RX ADMIN — ERYTHROMYCIN: 5 OINTMENT OPHTHALMIC at 01:00

## 2020-01-01 RX ADMIN — INSULIN LISPRO 8 UNITS: 100 INJECTION, SOLUTION INTRAVENOUS; SUBCUTANEOUS at 17:18

## 2020-01-01 RX ADMIN — CARVEDILOL 6.25 MG: 6.25 TABLET, FILM COATED ORAL at 10:35

## 2020-01-01 RX ADMIN — INSULIN LISPRO 8 UNITS: 100 INJECTION, SOLUTION INTRAVENOUS; SUBCUTANEOUS at 12:19

## 2020-01-01 RX ADMIN — POTASSIUM CHLORIDE 10 MEQ: 7.46 INJECTION, SOLUTION INTRAVENOUS at 05:56

## 2020-01-01 RX ADMIN — SODIUM CHLORIDE 800 ML: 900 INJECTION, SOLUTION INTRAVENOUS at 09:56

## 2020-01-01 RX ADMIN — MORPHINE SULFATE 2 MG: 2 INJECTION, SOLUTION INTRAMUSCULAR; INTRAVENOUS at 20:55

## 2020-01-01 RX ADMIN — APIXABAN 2.5 MG: 2.5 TABLET, FILM COATED ORAL at 10:28

## 2020-01-01 RX ADMIN — APIXABAN 2.5 MG: 2.5 TABLET, FILM COATED ORAL at 17:25

## 2020-01-01 RX ADMIN — CARVEDILOL 6.25 MG: 6.25 TABLET, FILM COATED ORAL at 09:50

## 2020-01-01 RX ADMIN — SODIUM CHLORIDE 25 MG: 900 INJECTION, SOLUTION INTRAVENOUS at 16:47

## 2020-01-01 RX ADMIN — FUROSEMIDE 40 MG: 10 INJECTION, SOLUTION INTRAMUSCULAR; INTRAVENOUS at 08:31

## 2020-01-01 RX ADMIN — APIXABAN 5 MG: 5 TABLET, FILM COATED ORAL at 22:00

## 2020-01-01 RX ADMIN — DEXTROSE 50 % IN WATER (D50W) INTRAVENOUS SYRINGE 25 G: at 17:48

## 2020-01-01 RX ADMIN — Medication 10 ML: at 22:35

## 2020-01-01 RX ADMIN — ERYTHROMYCIN: 5 OINTMENT OPHTHALMIC at 09:53

## 2020-01-01 RX ADMIN — LORAZEPAM 1 MG: 2 INJECTION, SOLUTION INTRAMUSCULAR; INTRAVENOUS at 00:56

## 2020-01-01 RX ADMIN — MILRINONE LACTATE IN DEXTROSE 0.38 MCG/KG/MIN: 200 INJECTION, SOLUTION INTRAVENOUS at 01:10

## 2020-01-01 RX ADMIN — ERYTHROMYCIN: 5 OINTMENT OPHTHALMIC at 03:19

## 2020-01-01 RX ADMIN — Medication 1000 MG: at 17:21

## 2020-01-01 RX ADMIN — Medication 10 ML: at 10:37

## 2020-01-01 RX ADMIN — SODIUM CHLORIDE 40 MG: 9 INJECTION, SOLUTION INTRAMUSCULAR; INTRAVENOUS; SUBCUTANEOUS at 16:12

## 2020-01-01 RX ADMIN — ERYTHROMYCIN: 5 OINTMENT OPHTHALMIC at 20:42

## 2020-01-01 RX ADMIN — PANTOPRAZOLE SODIUM 40 MG: 40 TABLET, DELAYED RELEASE ORAL at 09:20

## 2020-01-01 RX ADMIN — Medication 1000 MG: at 09:39

## 2020-01-01 RX ADMIN — ERYTHROMYCIN: 5 OINTMENT OPHTHALMIC at 09:41

## 2020-01-01 RX ADMIN — INSULIN GLARGINE 5 UNITS: 100 INJECTION, SOLUTION SUBCUTANEOUS at 11:58

## 2020-01-01 RX ADMIN — CARVEDILOL 6.25 MG: 6.25 TABLET, FILM COATED ORAL at 17:25

## 2020-01-01 RX ADMIN — CARVEDILOL 6.25 MG: 6.25 TABLET, FILM COATED ORAL at 10:40

## 2020-01-01 RX ADMIN — CARVEDILOL 6.25 MG: 6.25 TABLET, FILM COATED ORAL at 18:26

## 2020-01-01 RX ADMIN — ACETAMINOPHEN 650 MG: 325 TABLET ORAL at 17:21

## 2020-01-01 RX ADMIN — ERYTHROMYCIN: 5 OINTMENT OPHTHALMIC at 00:47

## 2020-01-01 RX ADMIN — HEPARIN SODIUM 18 UNITS/KG/HR: 10000 INJECTION, SOLUTION INTRAVENOUS at 04:17

## 2020-01-01 RX ADMIN — Medication 10 ML: at 00:16

## 2020-01-01 RX ADMIN — FERROUS SULFATE TAB 325 MG (65 MG ELEMENTAL FE) 325 MG: 325 (65 FE) TAB at 08:49

## 2020-01-01 RX ADMIN — ERYTHROMYCIN: 5 OINTMENT OPHTHALMIC at 04:41

## 2020-01-01 RX ADMIN — Medication 1000 MG: at 17:57

## 2020-01-01 RX ADMIN — Medication 100 MG: at 10:59

## 2020-01-01 RX ADMIN — CARVEDILOL 6.25 MG: 6.25 TABLET, FILM COATED ORAL at 17:40

## 2020-01-01 RX ADMIN — TUBERCULIN PURIFIED PROTEIN DERIVATIVE 5 UNITS: 5 INJECTION, SOLUTION INTRADERMAL at 15:00

## 2020-01-01 RX ADMIN — PANTOPRAZOLE SODIUM 40 MG: 40 TABLET, DELAYED RELEASE ORAL at 09:50

## 2020-01-01 RX ADMIN — ERYTHROMYCIN: 5 OINTMENT OPHTHALMIC at 17:20

## 2020-01-01 RX ADMIN — SODIUM CHLORIDE 40 MG: 9 INJECTION, SOLUTION INTRAMUSCULAR; INTRAVENOUS; SUBCUTANEOUS at 10:17

## 2020-01-01 RX ADMIN — ERYTHROMYCIN: 5 OINTMENT OPHTHALMIC at 07:55

## 2020-01-01 RX ADMIN — INSULIN LISPRO 3 UNITS: 100 INJECTION, SOLUTION INTRAVENOUS; SUBCUTANEOUS at 17:29

## 2020-01-01 RX ADMIN — METOPROLOL TARTRATE 50 MG: 50 TABLET, FILM COATED ORAL at 00:13

## 2020-01-01 RX ADMIN — LEVOTHYROXINE SODIUM 100 MCG: 100 TABLET ORAL at 11:45

## 2020-01-01 RX ADMIN — CARVEDILOL 6.25 MG: 6.25 TABLET, FILM COATED ORAL at 21:46

## 2020-01-01 RX ADMIN — DEXTROSE MONOHYDRATE 25 G: 25 INJECTION, SOLUTION INTRAVENOUS at 17:48

## 2020-01-01 RX ADMIN — LORAZEPAM 1 MG: 2 INJECTION INTRAMUSCULAR; INTRAVENOUS at 22:02

## 2020-01-01 RX ADMIN — MIDAZOLAM HYDROCHLORIDE 2 MG: 1 INJECTION, SOLUTION INTRAMUSCULAR; INTRAVENOUS at 08:17

## 2020-01-01 RX ADMIN — INSULIN LISPRO 2 UNITS: 100 INJECTION, SOLUTION INTRAVENOUS; SUBCUTANEOUS at 17:41

## 2020-01-01 RX ADMIN — MIDAZOLAM HYDROCHLORIDE 3 MG/HR: 5 INJECTION INTRAMUSCULAR; INTRAVENOUS at 21:00

## 2020-01-01 RX ADMIN — FAMOTIDINE 20 MG: 20 TABLET ORAL at 10:40

## 2020-01-01 RX ADMIN — LEVOTHYROXINE SODIUM 100 MCG: 100 TABLET ORAL at 05:03

## 2020-01-01 RX ADMIN — SENNOSIDES AND DOCUSATE SODIUM 2 TABLET: 8.6; 5 TABLET ORAL at 21:22

## 2020-01-01 RX ADMIN — ERYTHROMYCIN: 5 OINTMENT OPHTHALMIC at 18:10

## 2020-01-01 RX ADMIN — WATER 1 G: 1 INJECTION INTRAMUSCULAR; INTRAVENOUS; SUBCUTANEOUS at 22:28

## 2020-01-01 RX ADMIN — AZITHROMYCIN MONOHYDRATE 500 MG: 500 INJECTION, POWDER, LYOPHILIZED, FOR SOLUTION INTRAVENOUS at 22:28

## 2020-01-01 RX ADMIN — ATORVASTATIN CALCIUM 80 MG: 40 TABLET, FILM COATED ORAL at 21:26

## 2020-01-01 RX ADMIN — Medication 10 ML: at 13:18

## 2020-01-01 RX ADMIN — Medication 100 MG: at 09:45

## 2020-01-01 RX ADMIN — ERYTHROMYCIN: 5 OINTMENT OPHTHALMIC at 13:12

## 2020-01-01 RX ADMIN — APIXABAN 5 MG: 5 TABLET, FILM COATED ORAL at 10:35

## 2020-01-01 RX ADMIN — ERYTHROMYCIN: 5 OINTMENT OPHTHALMIC at 21:34

## 2020-01-01 RX ADMIN — FAMOTIDINE 20 MG: 20 TABLET ORAL at 09:53

## 2020-01-01 RX ADMIN — Medication 10 ML: at 09:20

## 2020-01-01 RX ADMIN — MORPHINE SULFATE 2 MG: 2 INJECTION, SOLUTION INTRAMUSCULAR; INTRAVENOUS at 21:50

## 2020-01-01 RX ADMIN — ERYTHROMYCIN: 5 OINTMENT OPHTHALMIC at 13:35

## 2020-01-01 RX ADMIN — INSULIN GLARGINE 5 UNITS: 100 INJECTION, SOLUTION SUBCUTANEOUS at 09:40

## 2020-01-01 RX ADMIN — Medication 1000 MG: at 10:40

## 2020-01-01 RX ADMIN — ERYTHROMYCIN: 5 OINTMENT OPHTHALMIC at 18:00

## 2020-01-01 RX ADMIN — SODIUM CHLORIDE 125 ML/HR: 900 INJECTION, SOLUTION INTRAVENOUS at 16:21

## 2020-01-01 RX ADMIN — Medication: at 10:18

## 2020-01-01 RX ADMIN — CARVEDILOL 6.25 MG: 6.25 TABLET, FILM COATED ORAL at 09:40

## 2020-01-01 RX ADMIN — FAMOTIDINE 20 MG: 20 TABLET ORAL at 08:48

## 2020-01-01 RX ADMIN — CARVEDILOL 6.25 MG: 6.25 TABLET, FILM COATED ORAL at 16:56

## 2020-01-01 RX ADMIN — ASPIRIN 81 MG 81 MG: 81 TABLET ORAL at 10:18

## 2020-01-01 RX ADMIN — APIXABAN 2.5 MG: 2.5 TABLET, FILM COATED ORAL at 18:26

## 2020-01-01 RX ADMIN — Medication 1000 MG: at 21:22

## 2020-01-01 RX ADMIN — CARVEDILOL 3.12 MG: 3.12 TABLET, FILM COATED ORAL at 08:55

## 2020-01-01 RX ADMIN — AMIODARONE HYDROCHLORIDE 150 MG: 1.5 INJECTION, SOLUTION INTRAVENOUS at 16:52

## 2020-01-01 RX ADMIN — WATER 1 G: 1 INJECTION INTRAMUSCULAR; INTRAVENOUS; SUBCUTANEOUS at 22:30

## 2020-01-01 RX ADMIN — LEVOTHYROXINE SODIUM 100 MCG: 100 TABLET ORAL at 10:17

## 2020-01-01 RX ADMIN — INSULIN LISPRO 4 UNITS: 100 INJECTION, SOLUTION INTRAVENOUS; SUBCUTANEOUS at 12:42

## 2020-01-01 RX ADMIN — SENNOSIDES AND DOCUSATE SODIUM 2 TABLET: 8.6; 5 TABLET ORAL at 21:45

## 2020-01-01 RX ADMIN — LORAZEPAM 1 MG: 2 INJECTION INTRAMUSCULAR; INTRAVENOUS at 21:50

## 2020-01-01 RX ADMIN — APIXABAN 2.5 MG: 2.5 TABLET, FILM COATED ORAL at 21:22

## 2020-01-01 RX ADMIN — ERYTHROMYCIN: 5 OINTMENT OPHTHALMIC at 08:00

## 2020-01-01 RX ADMIN — LORAZEPAM 1 MG: 2 INJECTION INTRAMUSCULAR; INTRAVENOUS at 15:49

## 2020-01-01 RX ADMIN — APIXABAN 2.5 MG: 2.5 TABLET, FILM COATED ORAL at 17:57

## 2020-01-01 RX ADMIN — Medication: at 17:23

## 2020-01-01 RX ADMIN — CARVEDILOL 6.25 MG: 6.25 TABLET, FILM COATED ORAL at 17:58

## 2020-01-01 RX ADMIN — LORAZEPAM 1 MG: 2 INJECTION INTRAMUSCULAR; INTRAVENOUS at 21:12

## 2020-01-01 RX ADMIN — INSULIN LISPRO 2 UNITS: 100 INJECTION, SOLUTION INTRAVENOUS; SUBCUTANEOUS at 09:37

## 2020-01-01 RX ADMIN — SODIUM CHLORIDE 1000 MG: 900 INJECTION, SOLUTION INTRAVENOUS at 09:52

## 2020-01-01 RX ADMIN — LEVOTHYROXINE SODIUM 100 MCG: 100 TABLET ORAL at 06:44

## 2020-01-01 RX ADMIN — LEVOTHYROXINE SODIUM 100 MCG: 200 INJECTION, POWDER, LYOPHILIZED, FOR SOLUTION INTRAVENOUS at 10:28

## 2020-01-01 RX ADMIN — FAMOTIDINE 20 MG: 20 TABLET ORAL at 09:40

## 2020-01-01 RX ADMIN — Medication 5000 UNITS: at 09:53

## 2020-01-01 RX ADMIN — SODIUM CHLORIDE 1000 ML: 900 INJECTION, SOLUTION INTRAVENOUS at 08:30

## 2020-01-01 RX ADMIN — METOROPROLOL TARTRATE 5 MG: 5 INJECTION, SOLUTION INTRAVENOUS at 00:06

## 2020-01-01 RX ADMIN — ATORVASTATIN CALCIUM 80 MG: 40 TABLET, FILM COATED ORAL at 21:22

## 2020-01-01 RX ADMIN — Medication 100 MG: at 10:29

## 2020-01-01 RX ADMIN — CARVEDILOL 6.25 MG: 6.25 TABLET, FILM COATED ORAL at 09:46

## 2020-01-01 RX ADMIN — CARVEDILOL 12.5 MG: 12.5 TABLET, FILM COATED ORAL at 09:53

## 2020-01-01 RX ADMIN — LORAZEPAM 1 MG: 2 INJECTION INTRAMUSCULAR; INTRAVENOUS at 21:21

## 2020-01-01 RX ADMIN — LEVOTHYROXINE SODIUM 100 MCG: 100 TABLET ORAL at 07:21

## 2020-01-01 RX ADMIN — FAMOTIDINE 20 MG: 20 TABLET ORAL at 09:46

## 2020-01-01 RX ADMIN — ERYTHROMYCIN: 5 OINTMENT OPHTHALMIC at 20:00

## 2020-01-01 RX ADMIN — POTASSIUM BICARBONATE 20 MEQ: 782 TABLET, EFFERVESCENT ORAL at 02:35

## 2020-01-01 RX ADMIN — Medication 1000 MG: at 17:58

## 2020-01-01 RX ADMIN — Medication 1000 MG: at 09:53

## 2020-01-01 RX ADMIN — CARVEDILOL 6.25 MG: 6.25 TABLET, FILM COATED ORAL at 08:31

## 2020-01-01 RX ADMIN — CARVEDILOL 6.25 MG: 6.25 TABLET, FILM COATED ORAL at 18:09

## 2020-01-01 RX ADMIN — INSULIN LISPRO 6 UNITS: 100 INJECTION, SOLUTION INTRAVENOUS; SUBCUTANEOUS at 17:57

## 2020-01-01 RX ADMIN — APIXABAN 2.5 MG: 2.5 TABLET, FILM COATED ORAL at 09:40

## 2020-01-01 RX ADMIN — INSULIN LISPRO 2 UNITS: 100 INJECTION, SOLUTION INTRAVENOUS; SUBCUTANEOUS at 18:26

## 2020-01-01 RX ADMIN — FENTANYL CITRATE 50 MCG: 50 INJECTION, SOLUTION INTRAMUSCULAR; INTRAVENOUS at 08:40

## 2020-01-01 RX ADMIN — Medication: at 09:41

## 2020-01-01 RX ADMIN — INSULIN LISPRO 6 UNITS: 100 INJECTION, SOLUTION INTRAVENOUS; SUBCUTANEOUS at 18:26

## 2020-01-01 RX ADMIN — Medication 10 ML: at 22:30

## 2020-01-01 RX ADMIN — Medication 1000 MG: at 09:45

## 2020-01-01 RX ADMIN — ERYTHROMYCIN: 5 OINTMENT OPHTHALMIC at 16:52

## 2020-01-01 RX ADMIN — CARVEDILOL 6.25 MG: 6.25 TABLET, FILM COATED ORAL at 08:49

## 2020-01-01 RX ADMIN — Medication 5000 UNITS: at 09:40

## 2020-01-01 RX ADMIN — ERYTHROMYCIN: 5 OINTMENT OPHTHALMIC at 05:08

## 2020-01-01 RX ADMIN — HEPARIN SODIUM 18 UNITS/KG/HR: 10000 INJECTION, SOLUTION INTRAVENOUS at 16:22

## 2020-01-01 RX ADMIN — DEXTROSE MONOHYDRATE 25 ML/HR: 10 INJECTION, SOLUTION INTRAVENOUS at 22:30

## 2020-01-01 RX ADMIN — CARVEDILOL 6.25 MG: 6.25 TABLET, FILM COATED ORAL at 09:39

## 2020-01-01 RX ADMIN — CHLORHEXIDINE GLUCONATE 0.12% ORAL RINSE 10 ML: 1.2 LIQUID ORAL at 20:59

## 2020-01-01 RX ADMIN — FUROSEMIDE 80 MG: 10 INJECTION, SOLUTION INTRAMUSCULAR; INTRAVENOUS at 16:55

## 2020-01-01 RX ADMIN — ERYTHROMYCIN: 5 OINTMENT OPHTHALMIC at 09:55

## 2020-01-01 RX ADMIN — APIXABAN 2.5 MG: 2.5 TABLET, FILM COATED ORAL at 08:49

## 2020-01-01 RX ADMIN — FERROUS SULFATE TAB 325 MG (65 MG ELEMENTAL FE) 325 MG: 325 (65 FE) TAB at 09:40

## 2020-01-01 RX ADMIN — ERYTHROMYCIN: 5 OINTMENT OPHTHALMIC at 04:39

## 2020-01-01 RX ADMIN — Medication 5000 UNITS: at 10:40

## 2020-01-01 RX ADMIN — MORPHINE SULFATE 2 MG: 2 INJECTION, SOLUTION INTRAMUSCULAR; INTRAVENOUS at 20:40

## 2020-01-01 RX ADMIN — APIXABAN 2.5 MG: 2.5 TABLET, FILM COATED ORAL at 11:02

## 2020-01-01 RX ADMIN — Medication 5000 UNITS: at 08:49

## 2020-01-01 RX ADMIN — ERYTHROMYCIN: 5 OINTMENT OPHTHALMIC at 13:50

## 2020-01-01 RX ADMIN — ERYTHROMYCIN: 5 OINTMENT OPHTHALMIC at 00:13

## 2020-01-01 RX ADMIN — ERYTHROMYCIN: 5 OINTMENT OPHTHALMIC at 21:10

## 2020-01-01 RX ADMIN — CARVEDILOL 3.12 MG: 3.12 TABLET, FILM COATED ORAL at 18:21

## 2020-01-01 RX ADMIN — ERYTHROMYCIN: 5 OINTMENT OPHTHALMIC at 23:00

## 2020-01-01 RX ADMIN — FAMOTIDINE 20 MG: 20 TABLET ORAL at 10:50

## 2020-01-01 RX ADMIN — Medication 10 ML: at 16:04

## 2020-01-01 RX ADMIN — ERYTHROMYCIN: 5 OINTMENT OPHTHALMIC at 17:59

## 2020-01-01 RX ADMIN — POTASSIUM CHLORIDE 10 MEQ: 7.46 INJECTION, SOLUTION INTRAVENOUS at 04:50

## 2020-01-01 RX ADMIN — ATORVASTATIN CALCIUM 80 MG: 40 TABLET, FILM COATED ORAL at 22:27

## 2020-01-01 RX ADMIN — SODIUM CHLORIDE 125 ML/HR: 900 INJECTION, SOLUTION INTRAVENOUS at 00:35

## 2020-01-01 RX ADMIN — CEFTRIAXONE SODIUM 1 G: 1 INJECTION, POWDER, FOR SOLUTION INTRAMUSCULAR; INTRAVENOUS at 04:00

## 2020-01-01 RX ADMIN — CARVEDILOL 6.25 MG: 6.25 TABLET, FILM COATED ORAL at 10:29

## 2020-01-01 RX ADMIN — INSULIN LISPRO 4 UNITS: 100 INJECTION, SOLUTION INTRAVENOUS; SUBCUTANEOUS at 17:22

## 2020-01-01 RX ADMIN — INSULIN LISPRO 2 UNITS: 100 INJECTION, SOLUTION INTRAVENOUS; SUBCUTANEOUS at 13:01

## 2020-01-01 RX ADMIN — ERYTHROMYCIN: 5 OINTMENT OPHTHALMIC at 04:35

## 2020-01-01 RX ADMIN — CARVEDILOL 6.25 MG: 6.25 TABLET, FILM COATED ORAL at 17:55

## 2020-01-01 RX ADMIN — SENNOSIDES AND DOCUSATE SODIUM 2 TABLET: 8.6; 5 TABLET ORAL at 22:00

## 2020-01-01 RX ADMIN — APIXABAN 5 MG: 5 TABLET, FILM COATED ORAL at 09:20

## 2020-01-01 RX ADMIN — AZITHROMYCIN MONOHYDRATE 500 MG: 500 INJECTION, POWDER, LYOPHILIZED, FOR SOLUTION INTRAVENOUS at 21:59

## 2020-01-01 RX ADMIN — PERMETHRIN CREAM 5% W/W: 50 CREAM TOPICAL at 08:54

## 2020-01-01 RX ADMIN — ERYTHROMYCIN: 5 OINTMENT OPHTHALMIC at 11:53

## 2020-01-01 RX ADMIN — FERROUS SULFATE TAB 325 MG (65 MG ELEMENTAL FE) 325 MG: 325 (65 FE) TAB at 09:39

## 2020-01-01 RX ADMIN — CEFTRIAXONE SODIUM 1 G: 1 INJECTION, POWDER, FOR SOLUTION INTRAMUSCULAR; INTRAVENOUS at 04:35

## 2020-01-01 RX ADMIN — INSULIN LISPRO 2 UNITS: 100 INJECTION, SOLUTION INTRAVENOUS; SUBCUTANEOUS at 07:45

## 2020-01-01 RX ADMIN — CARVEDILOL 6.25 MG: 6.25 TABLET, FILM COATED ORAL at 10:17

## 2020-01-01 RX ADMIN — ERYTHROMYCIN: 5 OINTMENT OPHTHALMIC at 17:25

## 2020-01-01 RX ADMIN — ROCURONIUM BROMIDE 62 MG: 10 INJECTION INTRAVENOUS at 08:31

## 2020-01-01 RX ADMIN — Medication 5000 UNITS: at 11:45

## 2020-01-01 RX ADMIN — APIXABAN 2.5 MG: 2.5 TABLET, FILM COATED ORAL at 17:01

## 2020-01-01 RX ADMIN — FAMOTIDINE 20 MG: 20 TABLET ORAL at 11:51

## 2020-01-01 RX ADMIN — LORAZEPAM 1 MG: 2 INJECTION INTRAMUSCULAR; INTRAVENOUS at 20:21

## 2020-01-01 RX ADMIN — Medication: at 17:20

## 2020-01-01 RX ADMIN — Medication: at 09:31

## 2020-01-01 RX ADMIN — POTASSIUM CHLORIDE 20 MEQ: 1500 TABLET, EXTENDED RELEASE ORAL at 06:57

## 2020-01-01 RX ADMIN — Medication 1000 MG: at 17:01

## 2020-01-01 RX ADMIN — INSULIN LISPRO 3 UNITS: 100 INJECTION, SOLUTION INTRAVENOUS; SUBCUTANEOUS at 17:56

## 2020-01-01 RX ADMIN — APIXABAN 2.5 MG: 2.5 TABLET, FILM COATED ORAL at 10:40

## 2020-01-01 RX ADMIN — ERYTHROMYCIN: 5 OINTMENT OPHTHALMIC at 17:22

## 2020-01-01 RX ADMIN — ERYTHROMYCIN: 5 OINTMENT OPHTHALMIC at 03:30

## 2020-01-01 RX ADMIN — PANTOPRAZOLE SODIUM 40 MG: 40 TABLET, DELAYED RELEASE ORAL at 10:36

## 2020-01-01 RX ADMIN — CHLORHEXIDINE GLUCONATE 0.12% ORAL RINSE 10 ML: 1.2 LIQUID ORAL at 08:55

## 2020-01-01 RX ADMIN — Medication 1000 MG: at 17:25

## 2020-01-01 RX ADMIN — ACETAMINOPHEN 650 MG: 325 TABLET ORAL at 21:27

## 2020-01-01 RX ADMIN — Medication 100 MG: at 08:49

## 2020-01-01 RX ADMIN — SENNOSIDES AND DOCUSATE SODIUM 2 TABLET: 8.6; 5 TABLET ORAL at 23:52

## 2020-01-01 RX ADMIN — LORAZEPAM 1 MG: 2 INJECTION INTRAMUSCULAR; INTRAVENOUS at 03:04

## 2020-01-01 RX ADMIN — Medication 1000 MG: at 11:45

## 2020-01-01 RX ADMIN — ATORVASTATIN CALCIUM 80 MG: 40 TABLET, FILM COATED ORAL at 21:27

## 2020-01-01 RX ADMIN — FAMOTIDINE 20 MG: 20 TABLET ORAL at 10:59

## 2020-01-01 RX ADMIN — PROPOFOL 20 MCG/KG/MIN: 10 INJECTION, EMULSION INTRAVENOUS at 22:31

## 2020-01-01 RX ADMIN — APIXABAN 2.5 MG: 2.5 TABLET, FILM COATED ORAL at 08:31

## 2020-01-01 RX ADMIN — INSULIN LISPRO 6 UNITS: 100 INJECTION, SOLUTION INTRAVENOUS; SUBCUTANEOUS at 17:03

## 2020-01-01 RX ADMIN — Medication 10 ML: at 07:13

## 2020-01-01 RX ADMIN — ERYTHROMYCIN: 5 OINTMENT OPHTHALMIC at 09:31

## 2020-01-01 RX ADMIN — SODIUM CHLORIDE 250 ML: 900 INJECTION, SOLUTION INTRAVENOUS at 12:51

## 2020-01-01 RX ADMIN — Medication 1000 MG: at 08:48

## 2020-01-01 RX ADMIN — CARVEDILOL 6.25 MG: 6.25 TABLET, FILM COATED ORAL at 09:20

## 2020-01-01 RX ADMIN — ERYTHROMYCIN: 5 OINTMENT OPHTHALMIC at 22:28

## 2020-01-01 RX ADMIN — ATORVASTATIN CALCIUM 40 MG: 40 TABLET, FILM COATED ORAL at 22:30

## 2020-01-01 RX ADMIN — IOPAMIDOL 72 ML: 755 INJECTION, SOLUTION INTRAVENOUS at 16:05

## 2020-01-01 RX ADMIN — INSULIN LISPRO 4 UNITS: 100 INJECTION, SOLUTION INTRAVENOUS; SUBCUTANEOUS at 16:58

## 2020-01-01 RX ADMIN — ERYTHROMYCIN: 5 OINTMENT OPHTHALMIC at 12:46

## 2020-01-01 RX ADMIN — ERYTHROMYCIN: 5 OINTMENT OPHTHALMIC at 16:54

## 2020-01-01 RX ADMIN — INSULIN GLARGINE 4 UNITS: 100 INJECTION, SOLUTION SUBCUTANEOUS at 22:19

## 2020-01-01 RX ADMIN — APIXABAN 2.5 MG: 2.5 TABLET, FILM COATED ORAL at 09:50

## 2020-01-01 RX ADMIN — ASPIRIN 81 MG 81 MG: 81 TABLET ORAL at 10:28

## 2020-01-01 RX ADMIN — FERROUS SULFATE TAB 325 MG (65 MG ELEMENTAL FE) 325 MG: 325 (65 FE) TAB at 11:02

## 2020-01-01 RX ADMIN — MORPHINE SULFATE 2 MG: 2 INJECTION, SOLUTION INTRAMUSCULAR; INTRAVENOUS at 22:05

## 2020-01-01 RX ADMIN — Medication 1000 MG: at 18:00

## 2020-01-01 RX ADMIN — PROPOFOL 5 MCG/KG/MIN: 10 INJECTION, EMULSION INTRAVENOUS at 13:19

## 2020-01-01 RX ADMIN — FUROSEMIDE 40 MG: 10 INJECTION, SOLUTION INTRAMUSCULAR; INTRAVENOUS at 16:12

## 2020-01-01 RX ADMIN — ERYTHROMYCIN: 5 OINTMENT OPHTHALMIC at 17:01

## 2020-01-01 RX ADMIN — ALBUMIN (HUMAN) 25 G: 0.25 INJECTION, SOLUTION INTRAVENOUS at 03:21

## 2020-01-01 RX ADMIN — ERYTHROMYCIN: 5 OINTMENT OPHTHALMIC at 10:20

## 2020-01-01 RX ADMIN — ERYTHROMYCIN: 5 OINTMENT OPHTHALMIC at 10:55

## 2020-01-01 RX ADMIN — MIDAZOLAM HYDROCHLORIDE 2 MG/HR: 5 INJECTION INTRAMUSCULAR; INTRAVENOUS at 01:34

## 2020-01-01 RX ADMIN — LORAZEPAM 1 MG: 2 INJECTION INTRAMUSCULAR; INTRAVENOUS at 04:53

## 2020-01-01 RX ADMIN — CARVEDILOL 6.25 MG: 6.25 TABLET, FILM COATED ORAL at 11:02

## 2020-01-01 RX ADMIN — LEVOTHYROXINE SODIUM 100 MCG: 100 TABLET ORAL at 09:40

## 2020-01-01 RX ADMIN — ERYTHROMYCIN: 5 OINTMENT OPHTHALMIC at 21:49

## 2020-01-01 RX ADMIN — Medication 10 ML: at 17:56

## 2020-01-01 RX ADMIN — FUROSEMIDE 40 MG: 10 INJECTION, SOLUTION INTRAMUSCULAR; INTRAVENOUS at 23:00

## 2020-01-01 RX ADMIN — POTASSIUM CHLORIDE 10 MEQ: 7.46 INJECTION, SOLUTION INTRAVENOUS at 10:00

## 2020-01-01 RX ADMIN — INSULIN GLARGINE 5 UNITS: 100 INJECTION, SOLUTION SUBCUTANEOUS at 11:07

## 2020-01-01 RX ADMIN — FUROSEMIDE: 10 INJECTION, SOLUTION INTRAMUSCULAR; INTRAVENOUS at 17:04

## 2020-01-01 RX ADMIN — ERYTHROMYCIN: 5 OINTMENT OPHTHALMIC at 09:42

## 2020-07-08 PROBLEM — I48.19 PERSISTENT ATRIAL FIBRILLATION (HCC): Status: ACTIVE | Noted: 2020-01-01

## 2020-07-08 PROBLEM — E03.9 HYPOTHYROID: Status: ACTIVE | Noted: 2020-01-01

## 2020-07-08 PROBLEM — L03.90 CELLULITIS: Status: ACTIVE | Noted: 2020-01-01

## 2020-07-08 PROBLEM — I48.91 A-FIB (HCC): Status: ACTIVE | Noted: 2020-01-01

## 2020-07-08 PROBLEM — I48.0 PAROXYSMAL ATRIAL FIBRILLATION WITH RVR (HCC): Status: ACTIVE | Noted: 2020-01-01
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
## 2020-07-08 PROBLEM — I63.9 CVA (CEREBRAL VASCULAR ACCIDENT) (HCC): Status: ACTIVE | Noted: 2020-01-01

## 2020-07-08 PROBLEM — N90.60 LABIA ENLARGED: Status: ACTIVE | Noted: 2020-01-01

## 2020-07-08 PROBLEM — R77.8 ELEVATED TROPONIN: Status: ACTIVE | Noted: 2020-01-01

## 2020-07-08 PROBLEM — H10.9 CONJUNCTIVITIS OF BOTH EYES: Status: ACTIVE | Noted: 2020-01-01

## 2020-07-08 PROBLEM — N17.9 AKI (ACUTE KIDNEY INJURY) (HCC): Status: ACTIVE | Noted: 2020-01-01

## 2020-07-08 NOTE — WOUND CARE
Physical Exam  
Room 417: pt off the unit to vascular lab.  
Donell ALVAREZN, RN, [facility], [address]

## 2020-07-08 NOTE — PROGRESS NOTES
Reason for Admission:  A-fib [facility]) [I48.91] CVA (cerebral vascular accident) ([facility] [address] [I63.9] VALENTINA (acute kidney injury) ([facility] [address] [N17.9] RRAT Score:    14 Plan for utilizing home health:    No  
                 
Likelihood of Readmission:   LOW Transition of Care Plan:         
 
 
Initial assessment completed with patient's sister Soto Damico [id number]. Cognitive status of patient: alert and oriented Face sheet information confirmed:  yes. The patient designates her sister Hayden Walters to participate in her discharge plan and to receive any needed information. This patient lives in a home alone. Patient is not able to navigate steps as needed. Prior to hospitalization, patient was considered to be independent with ADLs/IADLS : no . If not independent,  Needs assistance with ADLs/IADLs Patient has a current ACP document on file: no [facility] transport will be available to transport patient home upon discharge. The patient already has [employer] equipment available in the home. Patient is not currently active with home health. Patient has not stayed in a skilled nursing facility or rehab. Was  stay within last 60 days : no. This patient is on dialysis :no 
 
List of available SNF agencies were provided and reviewed with the patient prior to discharge. Freedom of choice signed: yes, for any facility that can accept pt. Currently, the discharge plan is LTC. The patient states that she can obtain her medications from the pharmacy, and take her medications as directed. Patient's current insurance is [employer] Care Management Interventions PCP Verified by CM: Yes(per pt's sister, it has been a while pt saw her pcp last) Palliative Care Criteria Met (RRAT>21 & CHF Dx)?: No 
Mode of Transport at Discharge: [facility] Transition of Care Consult (CM Consult): Discharge Planning Physical Therapy Consult:  Yes 
 Occupational Therapy Consult: Yes Current Support Network: Lives Alone, Family Lives [place] Confirm Follow Up Transport: Family The Patient and/or Patient Representative was Provided with a Choice of Provider and Agrees with the Discharge Plan?: Yes Name of the Patient Representative Who was Provided with a Choice of Provider and Agrees with the Discharge Plan: Harjit Cano [place] of Choice List was Provided with Basic Dialogue that Supports the Patient's Individualized Plan of Care/Goals, Treatment Preferences and Shares the Quality Data Associated with the Providers?: Yes Discharge [facility] TAMIKO Powers RN Care Management Pager: [phone]

## 2020-07-08 NOTE — PROGRESS NOTES
Problem: Self Care Deficits Care Plan (Adult) Goal: *Acute Goals and Plan of Care (Insert Text) Description: Occupational Therapy Goals Initiated 7/8/2020 within 7 day(s). 1.  Patient will perform bed mobility with moderate assistance in preparation for further self-care. 2.  Patient will perform self-feeding with modified independence. 3.  Patient will perform grooming with supervision/set-up. 4.  Patient will perform a functional activity/ADL sitting EOB, presenting with F sitting balance. 5.  Patient will participate in upper extremity therapeutic exercise/activities with supervision/set-up for 8 minutes. Prior Level of Function: Pt reports she was (I) with self-feeding, grooming, unable to state whether she needed assist with UB/LB dressing, bathing, and toileting PTA. Pt had a cane. Per chart review, pt lived alone. Outcome: Progressing Towards Goal 
 OCCUPATIONAL THERAPY EVALUATION Patient: Seda Yates (13 y.o. female) Date: 7/8/2020 Primary Diagnosis: A-fib ([facility] [I48.91] CVA (cerebral vascular accident) ([facility] [address] [I63.9] VALENTINA (acute kidney injury) ([facility] [address] [N17.9] Precautions: Falls ASSESSMENT : 
Upon entering room, pt received rolled towards R side, alert, and agreeable to OT eval. Pt seen in conjunction with PT to increase pt participation and to maximize safety of patient and staff members. Based on the objective data described below, the patient presents with increased pain, decreased strength, decreased activity tolerance, impaired cognition, and decreased independence in ADLs, increasing the need for assistance from others. Pt oriented to person and place (pt unable to correctly state [place], but did attempt). Pt not oriented to time, situation, nor president, therefore provided re-orientation. Pt presents BUE AROM WFL. Pt able to drink cup of water with straw with supervision/setup.  Pt with flexion of BLEs, unable to straighten on command. Pt reporting increased pain when donning R sock with total assist. Pt seen becoming tearful. Attempted to sit EOB, however pt reporting increased pain during movement of BLEs. Performed rolling with pt requiring max max assist x2 to R, total assist x2 to L, while therapist performed romulo care with total assist. The pt will benefit from a 3 day trial of skilled OT to assess her ability to participate and make progress toward goals. At the end of the session, pt left resting in bed, call bell in reach, with all needs met. Patient will benefit from skilled intervention to address the above impairments. Patient's rehabilitation potential is considered to be Good Factors which may influence rehabilitation potential include:  
[]             None noted [x]             Mental ability/status [x]             Medical condition [x]             Home/family situation and support systems [x]             Safety awareness [x]             Pain tolerance/management 
[]             Other: PLAN : 
Recommendations and Planned Interventions:  
[x]               Self Care Training                  [x]      Therapeutic Activities [x]               Functional Mobility Training   [x]      Cognitive Retraining 
[x]               Therapeutic Exercises           [x]      Endurance Activities [x]               Balance Training                    [x]      Neuromuscular Re-Education [x]               Visual/Perceptual Training     [x]      Home Safety Training 
[x]               Patient Education                   [x]      Family Training/Education []               Other (comment): Frequency/Duration: Patient will be followed by occupational therapy 1-2 times per day/4-7 days per week to address goals. Discharge Recommendations: SNF vs LTC Further Equipment Recommendations for Discharge: TBD at the next level of care SUBJECTIVE:  
Patient stated: \"Why do you have to put gloves on?  
 \"Why do you have to put that (a sock) on?\" \"Why do you have to leave? \" OBJECTIVE DATA SUMMARY:  
 
Past Medical History:  
Diagnosis Date  Cardiac echocardiogram 02/09/2015 EF 64%. No WMA. Mild LVH. Gr 2 DDfx. RVSP 30 mmHg. Mod-severe LAE. No significant valvular heart disease.  Cardiac nuclear imaging test, abnormal 02/09/2015 LV apical defect likely c/w prior infarction w/sm area of distal anterior romulo-infarct ischemia. EF 57%. Apical WMA, likely from prior infarct. Nondiagnostic EKG on pharm stress test.  
 Chronic kidney disease, stage III (moderate) ([address] 2/6/2012  Chronic renal disease, stage III ([address]  Congestive heart failure ([address] 2/6/2012  Congestive heart failure, unspecified 01/2012 Echocardiogram with severe global hypokinesis and EF 30% consistent with dilated cardiomyopathy.  DDD (degenerative disc disease), lumbar 9/10/2016  Diabetes mellitus ([address] 01/2015 Presented with blood sugar of 637 and HbA1c 13.4  Diabetes mellitus with kidney complication ([address] 0/0/7384  Dilated cardiomyopathy ([address] 2/6/2012  Hypertension  Hypothyroidism  Iron deficiency anemia 01/2015 Presented with Hb 5.1/ Hct 18.0. On Xarelto. No obvious source of bleeding. Did not follow-up for outpatient GI evaluation.  Low back pain 01/21/2015 Abdominal CT scan with thoracolumbar rotoscoliosis with severe multilevel degenerative disc disease and facet arthropathy; central canal stenosis at L4-L5.  Mental retardation 2/6/2012  Paroxysmal atrial fibrillation ([address] 01/2012 Presented with atrial fibrillation with RVR; converted spontaneously to sinus rhythm during hospitalization and subsequently started on amiodarone and Xarelto. History reviewed. No pertinent surgical history. Barriers to Learning/Limitations: yes;  emotional, cognitive, and altered mental status (i.e.Sedation, Confusion) Compensate with: visual, verbal, tactile, kinesthetic cues/model Home Situation:  
  
[x]  Right hand dominant   []  Left hand dominant Cognitive/Behavioral Status: 
Neurologic State: Alert;Confused Orientation Level: Oriented to person;Oriented to place; Disoriented to time Cognition: Decreased command following;Decreased attention/concentration Safety/Judgement: Lack of insight into deficits Skin: Visible skin appeared intact in BUEs Edema: None noted Coordination: BUE Coordination: Within functional limits Fine Motor Skills-Upper: Left Intact; Right Intact Gross Motor Skills-Upper: Left Intact; Right Intact Balance: 
Unable to assess at this time due to limited bed mobility performance this session. Strength: BUE Strength: Generally decreased, functional 
 
Tone & Sensation: BUE Tone: Normal 
Sensation: Intact Range of Motion: BUE 
AROM: Within functional limits Functional Mobility and Transfers for ADLs: 
Bed Mobility: 
Rolling: Maximum assistance;Assist x2(to R; Total assist x2 to L) Supine to Sit: (Unable to achieve at this time due to BLE pain) Transfers: 
NT 
 
ADL Assessment:  
Feeding: Setup Oral Facial Hygiene/Grooming: Minimum assistance Bathing: Maximum assistance Upper Body Dressing: Maximum assistance Lower Body Dressing: Total assistance Toileting: Total assistance ADL Intervention: 
Feeding Feeding Assistance: Stand-by assistance Drink to Mouth: Stand-by assistance Lower Body Dressing Assistance Dressing Assistance: Total assistance(dependent) Socks: Total assistance (dependent) Position Performed: (sidelying to R side) Toileting Toileting Assistance: Total assistance(dependent) Bowel Hygiene: Total assistance (dependent) Cognitive Retraining Safety/Judgement: Lack of insight into deficits Pain: 
Pain level pre-treatment: -/10 Pain level post-treatment: -/10 (Pt reporting increased pain in B feet after bed mobility, but did not provide  a number on the pain level scale) Pain Intervention(s): Medication (see MAR); Rest, Ice, Repositioning Response to intervention: Nurse notified, See doc flow Activity Tolerance:  
Poor Please refer to the flowsheet for vital signs taken during this treatment. After treatment:  
[] Patient left in no apparent distress sitting up in chair 
[x] Patient left in no apparent distress in bed 
[x] Call bell left within reach [x] Nursing notified 
[] Caregiver present [x] Bed alarm activated COMMUNICATION/EDUCATION:  
[x] Role of Occupational Therapy in the acute care setting 
[] Home safety education was provided and the patient/caregiver indicated understanding. [x] Patient/family have participated as able in goal setting and plan of care. [x] Patient/family agree to work toward stated goals and plan of care. [] Patient understands intent and goals of therapy, but is neutral about his/her participation. [] Patient is unable to participate in goal setting and plan of care. Thank you for this referral. 
Roya Sterling MS, OTR/L Time Calculation: 30 mins Eval Complexity: History: MEDIUM Complexity : Expanded review of history including physical, cognitive and psychosocial  history ; Examination: MEDIUM Complexity : 3-5 performance deficits relating to physical, cognitive , or psychosocial skils that result in activity limitations and / or participation restrictions; Decision Making:MEDIUM Complexity : Patient may present with comorbidities that affect occupational performnce. Miniml to moderate modification of tasks or assistance (eg, physical or verbal ) with assesment(s) is necessary to enable patient to complete evaluation

## 2020-07-08 NOTE — PROGRESS NOTES
Problem: Mobility Impaired (Adult and Pediatric) Goal: *Acute Goals and Plan of Care (Insert Text) Description: Physical Therapy Goals Initiated 7/8/2020 and to be accomplished within 7 day(s) 3 DAY TRIAL 1. Patient will roll side to side in bed with moderate assistance . 2.  Patient will move from supine to sit and sit to supine  in bed with maximal assistance. 3.  Patient will perform sitting edge of bed for 5-10 minutes with fair balance. 4.  Patient will transfer from bed to chair and chair to bed with maximal assistance using the least restrictive device. 5.  Patient will perform sit to stand with maximal assistance. 6.  Patient participate in gait and stair training when appropriate. Prior Level of Function:  
Pt does not seem to be the best historian and needed to be asked PLOF questions a couple of times for answers. Patient was independence for all mobility including gait using a cane. Patient lives alone in a single story home 1 step to enter. Pt states she has a walker at home if needed. Outcome: Progressing Towards Goal 
 PHYSICAL THERAPY EVALUATION Patient: Topher Galvan (79 y.o. female) Date: 7/8/2020 Primary Diagnosis: A-fib ([facility] [address] [I48.91] CVA (cerebral vascular accident) ([facility] [address] [I63.9] VALENTINA (acute kidney injury) ([facility] [address] [N17.9] Precautions:   Fall, Skin ASSESSMENT : 
PT orders received and patient cleared by nursing to participate with therapy. Patient is a 71 y.o. female admitted to the hospital due to found on floor at home. CT showed subacute infract. Patient consents to PT evaluation and treatment. Performed co-treatment with occupational therapy to increase participation, safety, and functional mobility. Pt laying mostly on right side throughout session and prefers right sidelying with increased hip and knee flexion and keeping feet mostly in PF. Pt has multiple wounds on LE especially on ankle and feet. Pt has redness throughout her LE as well. She is hypersensitive to her feet and ankles with touch. Increased time needed for safety and explanation of activities. Pt needed increased processing time and needed activities and plans to be repeated. Pt is very hesitant, fearful, and anxious for all movements. Unable to perform supine to sit today. Performed rolling as pt needed to be cleaned up. She required maximum assistance x2 for full roll to the right and total assistance x2 to the left. Pt followed directions for UE movements but did not follow directions well with LE movements or movements for mobility. Pt has very limited LE movements as she prefers to stay in the flexed position. Pt keeping B knees flexed >110 degrees with finally relaxing at end of session R knee 60 degrees and L knee 80 degrees. Never fully extended LE. Only trace movement noted in feet/ankles. Pt ended therapy supine in bed mostly turned to her right again with all needs met and alarm donned. Placing pt on 3 day trial for participation in therapy and progression toward goals. Patient will benefit from skilled intervention to address the above impairments. Patient's rehabilitation potential is considered to be Fair Factors which may influence rehabilitation potential include:   
[]         None noted 
[x]         Mental ability/status [x]         Medical condition 
[x]         Home/family situation and support systems 
[x]         Safety awareness 
[]         Pain tolerance/management 
[]         Other: PLAN : 
Recommendations and Planned Interventions:   
[x]           Bed Mobility Training             [x]    Neuromuscular Re-Education 
[x]           Transfer Training                   []    Orthotic/Prosthetic Training 
[x]           Gait Training                          [x]    Modalities [x]           Therapeutic Exercises           [x]    Edema Management/Control 
[x]           Therapeutic Activities            [x]    Family Training/Education [x]           Patient Education 
[]           Other (comment): Frequency/Duration: Patient will be followed by physical therapy 1-2 times per day/4-7 days per week to address goals. Discharge Recommendations: SNF if pt will participate in therapy otherwise LTC Further Equipment Recommendations for Discharge: N/A  
 
SUBJECTIVE:  
Patient stated Why?  \"When will I feel better? \" \"When can I leave? \" OBJECTIVE DATA SUMMARY:  
 
Past Medical History:  
Diagnosis Date Cardiac echocardiogram 02/09/2015 EF 64%. No WMA. Mild LVH. Gr 2 DDfx. RVSP 30 mmHg. Mod-severe LAE. No significant valvular heart disease. Cardiac nuclear imaging test, abnormal 02/09/2015 LV apical defect likely c/w prior infarction w/sm area of distal anterior romulo-infarct ischemia. EF 57%. Apical WMA, likely from prior infarct. Nondiagnostic EKG on pharm stress test.  
 Chronic kidney disease, stage III (moderate) ([address] 2/6/2012 Chronic renal disease, stage III ([address] Congestive heart failure ([address] 2/6/2012 Congestive heart failure, unspecified 01/2012 Echocardiogram with severe global hypokinesis and EF 30% consistent with dilated cardiomyopathy. DDD (degenerative disc disease), lumbar 9/10/2016 Diabetes mellitus ([address] 01/2015 Presented with blood sugar of 637 and HbA1c 13.4 Diabetes mellitus with kidney complication ([address] 4/6/6524 Dilated cardiomyopathy ([address] 2/6/2012 Hypertension Hypothyroidism Iron deficiency anemia 01/2015 Presented with Hb 5.1/ Hct 18.0. On Xarelto. No obvious source of bleeding. Did not follow-up for outpatient GI evaluation. Low back pain 01/21/2015 Abdominal CT scan with thoracolumbar rotoscoliosis with severe multilevel degenerative disc disease and facet arthropathy; central canal stenosis at L4-L5. Mental retardation 2/6/2012 Paroxysmal atrial fibrillation ([address] 01/2012 Presented with atrial fibrillation with RVR; converted spontaneously to sinus rhythm during hospitalization and subsequently started on amiodarone and Xarelto. History reviewed. No pertinent surgical history. Barriers to Learning/Limitations: yes;  sensory deficits-vision/hearing/speech, physical, and altered mental status (i.e.Sedation, Confusion) Compensate with: Visual Cues, Verbal Cues, and Tactile Cues Home Situation: 
Home Situation Home Environment: Private residence # Steps to Enter: 1 Critical Behavior: 
Neurologic State: Alert;Confused Orientation Level: Oriented to person;Oriented to place; Disoriented to time;Disoriented to situation Cognition: Decreased command following;Decreased attention/concentration;Poor safety awareness Safety/Judgement: Lack of insight into deficits; Fall prevention Psychosocial 
Patient Behaviors: Anxious; Tearful;Restless Needs Expressed: Emotional 
Purposeful Interaction: No (comment) Pt Identified Daily Priority: Clinical issues (comment) [employer] Process: Nurture loving kindness; Attend basic human needs; Supportive expression Caring Interventions: Reassure Reassure: Informing;Caring rounds Therapeutic Modalities: Intentional therapeutic touch Skin Condition/Temp: Warm;Fragile Skin Integrity: Excoriation(romulo area is red, excoratiated badly and very swollen) Skin Integumentary Skin Color: Red Skin Condition/Temp: Warm;Fragile Skin Integrity: Excoriation(romulo area is red, excoratiated badly and very swollen) Turgor: Epidermis thin w/ loss of subcut tissue Varicosities: Absent Nails: Exceptions to [facility] Exceptions to WDL: Thick; Discolored B LE Strength:   
Strength: Grossly decreased, non-functional(grossly 2-/5 at this time B LE) B LE Tone & Sensation:  
Tone: Normal         
Sensation: (hypersensative B LE especially feet and ankles) B LE Range Of Motion: AROM: Generally decreased, functional(limited ROM B LE keeps knees flexed >110 deg with relax ) Functional Mobility: 
Bed Mobility: 
Rolling: Maximum assistance;Assist x2(to R; Total assist x2 to L) Supine to Sit: (Unable to achieve at this time due to BLE pain) Therapeutic Exercises:  
Reviewed and performed ankle pumps to increase blood flow and circulation. Pain: Moderate to severe pain throughout session especially B feet (pt would not rate or state exactly where the pain was located) Pain Intervention(s) : Medication (see MAR); Rest, Repositioning Response to intervention: Nurse notified, See doc flow Activity Tolerance:  
poor Please refer to the flowsheet for vital signs taken during this treatment. After treatment:  
[]         Patient left in no apparent distress sitting up in chair 
[x]         Patient left in no apparent distress in bed 
[x]         Call bell left within reach [x]   Personal items in reach [x]         Nursing notified Raj Orosco 
[]         Caregiver present [x]         Bed/chair alarm activated 
[]         SCDs applied COMMUNICATION/EDUCATION:  
[x]         Role of Physical Therapy in the acute care setting. [x]         Fall prevention education was provided. []         Patient/family have participated as able in goal setting and plan of care. []         Patient/family agree to work toward stated goals and plan of care. []         Patient understands intent and goals of therapy, but is neutral about his/her participation. [x]         Patient is unable to participate in goal setting/plan of care: ongoing with therapy staff. 
[]         Out of bed with nursing assistance 3-5 times a day. []         Other: Thank you for this referral. 
Malachi Keane, PT, DPT Time Calculation: 33 mins Eval Complexity: History: HIGH Complexity :3+ comorbidities / personal factors will impact the outcome/ POC Exam:HIGH Complexity : 4+ Standardized tests and measures addressing body structure, function, activity limitation and / or participation in recreation  Presentation: MEDIUM Complexity : Evolving with changing characteristics  Clinical Decision Making:Medium Complexity    Overall Complexity:MEDIUM

## 2020-07-08 NOTE — PROGRESS NOTES
NUTRITION Nursing Referral: Pressure Injury Nutrition Consult: General Nutrition Management & Supplements RECOMMENDATIONS / PLAN:  
 
- Add supplement: Glucerna Shake BID 
- Monitor/ encourage po intake - Continue RD inpatient monitoring and evaluation. NUTRITION INTERVENTIONS & DIAGNOSIS:  
 
- Meals/snacks: modified composition - Medical food supplement therapy: initiate - Collaboration and referral of nutrition care: interdisciplinary rounds Nutrition Diagnosis: Increased nutrient needs (protein) related to promotion of wound healing as evidenced by pt with pressure injury wound. Unintended weight loss related to predicated prolonged inadequate energy intake as evidenced by wt loss of 12.1% x 3 year and 9 month PTA per chart hx ASSESSMENT:  
 
Noted pt with intellectual deficit, poor historian per H&P; unable to physically see pt today, pt unavailable at times of visit. Admitted for CVA; has impaired renal function. Has skin breakdown. Wt loss in past several years per chart hx. On dysphagia diet per SLP. Noted plan per MD to check vitamins C and D, phos, Mg, and thiamine levels. Nutritional intake adequate to meet patients estimated nutritional needs:  Unable to determine at this time Diet: DIET DYSPHAGIA PUREED (NDD1) Food Allergies:  None known Current Appetite: Unknown Appetite/meal intake prior to admission: Unknown Feeding Limitations:  [x] Swallowing difficulty: SLP following     [] Chewing difficulty    [] Other: 
Current Meal Intake: No data found. BM: unknown Skin Integrity: unstageable sacral pressure injury; moisture associated skin damage on right & left lower legs;  Callous/corns on right and left elbows;  Edematous on perineum anterior labia; Moisture associated skin damage on right and left eyes; Right and left hip wounds/ blanchable redness Edema:   [] No     [x] Yes Pertinent Medications: Reviewed: atorvastatin, famotidine, SSI, levothyroxine, ondansetron, bowel regimen Recent Labs  
  07/08/20 
0420 07/07/20 
2350 * 144  
K 5.9* 5.0  
* 112* CO2 26 26 * 123* BUN 80* 78* CREA 1.88* 1.94* CA 9.3 9.4 ALB  --  2.5* ALT  --  52 No intake or output data in the 24 hours ending 07/08/20 1636 Anthropometrics: 
Ht Readings from Last 1 Encounters:  
07/07/20 5' (1.524 m) Last 3 Recorded Weights in this Encounter 07/07/20 2359 07/08/20 4530 Weight: 50.3 kg (111 lb) 49.4 kg (109 lb) Body mass index is 21.29 kg/m². Weight History:  -15 lb, 12.1% x 3 year and 9 month PTA per chart hx 
 
Weight Metrics 7/8/2020 9/28/2016 9/6/2016 6/2/2016 4/18/2016 1/19/2016 1/19/2016 Weight 109 lb 124 lb 121 lb 8 oz 128 lb 128 lb 3.2 oz 123 lb 9.6 oz 123 lb 9.6 oz  
BMI 21.29 kg/m2 20.63 kg/m2 20.22 kg/m2 21.3 kg/m2 21.33 kg/m2 20.57 kg/m2 20.57 kg/m2 Admitting Diagnosis: A-fib ([facility] [I48.91] CVA (cerebral vascular accident) ([facility] [I63.9] VALENTINA (acute kidney injury) ([facility] [N17.9] Pertinent PMHx: CKD, CHF, DM, HTN, hypothyroidism, iron deficiency anemia Education Needs:        [x] None identified  [] Identified - Not appropriate at this time  []  Identified and addressed - refer to education log Learning Limitations:   [] None identified  [x] Identified: intellectual deficit, conjunctivitis of both eyes Cultural, Jew & ethnic food preferences:  [x] None identified    [] Identified and addressed ESTIMATED NUTRITION NEEDS:  
 
Calories: 1879-4190 kcal (30-35 kcal/kg) based on  [x] Actual BW: 49 kg      [] IBW Protein: 62-74 gm (1.25-1.5 gm/kg) based on  [x] Actual BW      [] IBW Fluid: 1 mL/kcal 
  
MONITORING & EVALUATION:  
 
Nutrition Goal(s):  
- PO nutrition intake will meet >75% of patient estimated nutritional needs within the next 7 days.    Outcome: New/Initial goal  
 
Monitoring:   [x] Food and nutrient intake   [x] Food and nutrient administration  [x] Comparative standards   [x] Nutrition-focused physical findings   [x] Anthropometric Measurements   [x] Treatment/therapy   [x] Biochemical data, medical tests, and procedures Previous Recommendations (for follow-up assessments only):  Not Applicable Discharge Planning: No nutritional discharge needs at this time. Participated in care planning, discharge planning, & interdisciplinary rounds as appropriate. Talat Hickman RD Pager: [phone]

## 2020-07-08 NOTE — PROGRESS NOTES
[facility] SERVICES APS. Message that they are currently closed because of staff that tested positive for COVID and they will reopen on Friday the 10th Called the APS hotline and waited on the line until 1202. Will try again. 1340: Called [facility] hotline [id number] and reported this case to representative Hoyt. She stated she will forward it to the [address] [place] [address]. Edgardo Capps BSN RN Care Management Pager: [phone]

## 2020-07-08 NOTE — PROGRESS NOTES
OT order received and chart reviewed. Pt currently has orders for Duplex of BLEs. Will await results and f/u as pt's schedule allows.  
 
Thank you for this referral. 
Blaise Pino MS, OTR/L

## 2020-07-08 NOTE — PROGRESS NOTES
PT orders received, chart reviewed. Pt unable to participate with PT due to pt requiring pending B LE duplex study and CT of pelvis. Will f/u later as patient's schedule allows.  Thank you for this referral. 
Antonio Menchaca, PT, DPT

## 2020-07-08 NOTE — H&P
History & Physical 
 
Patient: Jose Luis Christian MRN: [id number]  CSN: [id number] YOB: 1955  Age: 71 y.o. Sex: female DOA: 7/7/2020 CC: fall and leg swelling PCP: Kendall Blizzard, MD 
    
HPI:  
 
Jose Luis Christian is a 71 y.o. female with medical co-morbidities including HTN, dilated cardiomyopathy, paroxysmal AFib not on OA, hypothyroidism, intellectual deficit, presented from home via EMS after her sister found her on the floor. According to patient, she fell the other night and she could not get up. She does not know how long she has been on the floor. Her sister found her on the floor to be alert and awake. She denied pain in her joints or hip, she has left lower rib pain on palpation. Currently, she denies headache, no chest pain or shortness of breath, no swallowing pain, no leg pain. According to her sister Milan Holland), at bedside, she was last seen normal on Friday. Otherwise, the patient has been living at home by herself since her mother was admitted to nursing home about a year ago. Patient has not been followed up with PCP or known to take her medications. Patient is a poor historian overall. In the ER, she was found with AFIB with RVR, no fever no leukocytosis. She was give IV lopressor with rate control. Amiodarone oral was given. IV 500cc NS was given due to concern for her h/o EF30%. Renal function was depressed. Troponin was elevated. CT head was concern for subacute stroke. Hence, Hep gtt was started. Review of Systems: limited GENERAL: No fever, No chill HEENT: No change in vision, no ear ache, no sore throat or sinus congestion. NECK: No pain or stiffness. PULMONARY: No shortness of breath, no cough or wheeze. Cardiovascular: no pnd / orthopnea, no Chest Pain GASTROINTESTINAL: No abd pain, No nausea/vomiting, No diarrhea, No red blood per rectum. GENITOURINARY: + urinary frequency, No urgency or pain with urination. MUSCULOSKELETAL: + joint or muscle pain, no back pain, no recent trauma. DERMATOLOGIC: +leg rash, no itching, no lesions. ENDOCRINE: No polyuria, polydipsia, No recent change in weight. HEMATOLOGICAL: No easy bruising or bleeding. NEUROLOGIC: No headache, No seizures, No generalized weakness Past Medical History:  
Diagnosis Date  Cardiac echocardiogram 02/09/2015 EF 64%. No WMA. Mild LVH. Gr 2 DDfx. RVSP 30 mmHg. Mod-severe LAE. No significant valvular heart disease.  Cardiac nuclear imaging test, abnormal 02/09/2015 LV apical defect likely c/w prior infarction w/sm area of distal anterior romulo-infarct ischemia. EF 57%. Apical WMA, likely from prior infarct. Nondiagnostic EKG on pharm stress test.  
 Chronic kidney disease, stage III (moderate) ([address] 2/6/2012  Chronic renal disease, stage III ([address]  Congestive heart failure ([address] 2/6/2012  Congestive heart failure, unspecified 01/2012 Echocardiogram with severe global hypokinesis and EF 30% consistent with dilated cardiomyopathy.  DDD (degenerative disc disease), lumbar 9/10/2016  Diabetes mellitus ([address] 01/2015 Presented with blood sugar of 637 and HbA1c 13.4  Diabetes mellitus with kidney complication ([address] 4/2/1441  Dilated cardiomyopathy ([address] 2/6/2012  Hypertension  Hypothyroidism  Iron deficiency anemia 01/2015 Presented with Hb 5.1/ Hct 18.0. On Xarelto. No obvious source of bleeding. Did not follow-up for outpatient GI evaluation.  Low back pain 01/21/2015 Abdominal CT scan with thoracolumbar rotoscoliosis with severe multilevel degenerative disc disease and facet arthropathy; central canal stenosis at L4-L5.  Mental retardation 2/6/2012  Paroxysmal atrial fibrillation ([address] 01/2012 Presented with atrial fibrillation with RVR; converted spontaneously to sinus rhythm during hospitalization and subsequently started on amiodarone and Xarelto. History reviewed. No pertinent surgical history. History reviewed. No pertinent family history. Social History Socioeconomic History  Marital status: SINGLE Spouse name: Not on file  Number of children: Not on file  Years of education: Not on file  Highest education level: Not on file Tobacco Use  Smoking status: Never Smoker  Smokeless tobacco: Never Used Substance and Sexual Activity  Alcohol use: No  
 Drug use: No  
 Sexual activity: Never Birth control/protection: None Prior to Admission medications Medication Sig Start Date End Date Taking? Authorizing Provider  
lisinopril (PRINIVIL, ZESTRIL) 20 mg tablet Take 1 Tab by mouth daily. 12/19/17   Sanford Dahl MD  
SITagliptin-metFORMIN (JANUMET)  mg per tablet TAKE 1 TABLET BY MOUTH TWO (2) TIMES DAILY WITH MEALS. 9/5/17   Sanford Dahl MD  
furosemide (LASIX) 40 mg tablet TAKE 1 TABLET DAILY 7/25/17   Esther Benitez PA  
amiodarone (CORDARONE) 200 mg tablet TAKE 1 TABLET DAILY 7/25/17   Marino Benitez PA  
traMADol (ULTRAM) 50 mg tablet Take 1/2-1 tab po daily-tid prn pain 1/25/17   Leo Zuniga MD  
LORazepam (ATIVAN) 0.5 mg tablet TAKE 1 TABLET BY MOUTH EVERY 8 HOURS AS NEEDED FOR ANXIETY 3/6/16   Lara Silva MD  
[facility] crea 240 g by Does Not Apply route four (4) times daily. Anti-Inflam Meloxicam 0.09% Topirmate 1% Methocarbamol 2%  Lidocaine 2% Prilocaine 2% 1/19/16   Jorge ERICKSON, NP  
levothyroxine (SYNTHROID) 75 mcg tablet TAKE 1 TABLET BY MOUTH DAILY BEFORE BREAKFAST 9/8/15   Lara Silva MD  
pantoprazole (PROTONIX) 40 mg tablet Take 1 tablet by mouth daily. 1/22/15   Mohinder Pretty MD  
carvedilol (COREG) 6.25 mg tablet Take 1 tablet by mouth two (2) times a day. 1/22/15   Mohinder Pretty MD  
triamcinolone acetonide (KENALOG) 0.1 % topical cream Apply  to affected area two (2) times a day.  use thin layer 11/19/13   Lara Silva MD  
 mupirocin (BACTROBAN) 2 % ointment Apply  to affected area daily. Provider, Historical  
Lancets Misc Use tid 2/28/12   Jeannie Kevin MD  
 
 
No Known Allergies Physical Exam:  
  
Visit Vitals BP (!) 125/103 Pulse (!) 117 Temp 98.2 °F (36.8 °C) Resp 14 Ht 5' (1.524 m) Wt 50.3 kg (111 lb) SpO2 95% BMI 21.68 kg/m² Physical Exam: 
Tele: afib at 115 General:  Cooperative, Not in acute distress, speaks in short sentence while in bed Frail and cachetic HEENT: PERRL, EOMI, (conjunctivitis), supple neck with palpable bone, no JVD, dry oral mucosa Cardiovascular: S1S2 irregular, no rub/gallop Pulmonary: air entry bilaterally, no wheezing, no crackle GI:  Soft, non tender, non distended, +bs, no guarding Extremities:  +bilateral pedal edema, +distal pulses appreciated Neuro: AOx3, moving all extremities, no gross deficit. SKIN: red swelling foot, venous stasis skin changes of legs/feet Lab/Data Review: 
Labs: Results:  
   
Chemistry Recent Labs  
  07/07/20 2350 *   
K 5.0  
* CO2 26 BUN 78* CREA 1.94* CA 9.4 AGAP 6  
BUCR 40* AP 90  
TP 6.2* ALB 2.5*  
GLOB 3.7 AGRAT 0.7* CBC w/Diff Recent Labs  
  07/07/20 2350 WBC 9.4  
RBC 5.60* HGB 18.8* HCT 53.1*  
 GRANS 82* LYMPH 10* EOS 0 Coagulation Iron/Ferritin BNP Cardiac Enzymes Recent Labs  
  07/07/20 2350 * Liver Enzymes Recent Labs  
  07/07/20 2350 TP 6.2* ALB 2.5* AP 90 Thyroid Studies Lab Results Component Value Date/Time TSH 4.90 (H) 07/07/2020 11:50 PM  
    
 
All Micro Results Procedure Component Value Units Date/Time CULTURE, BLOOD [[id number]] Collected:  07/08/20 0005 Order Status:  Completed Specimen:  Blood Updated:  07/08/20 0018 CULTURE, BLOOD [[id number]] Collected:  07/07/20 2350 Order Status:  Completed Specimen:  Blood Updated:  07/08/20 0018 Imaging Reviewed: CT Results (most recent): 
Results from [facility] Encounter encounter on 07/07/20 CT HEAD WO CONT Narrative CT OF THE BRAIN 
 
COMPARISON: January 30, 2012. INDICATIONS: Fall with confusion. TECHNIQUE: Axial sections through the brain at 5 mm collimation without IV 
contrast are obtained. FINDINGS:   
 
The ventricles are of normal size and configuration without midline shift. [place] Nims A region of encephalomalacia in the right posterior frontal region is present 
about 4 cm in diameter. Sparing of the cortical margin evident. The brain 
parenchyma is otherwise of normal attenuation. . Gray-white differentiation is 
satisfactory. No acute hemorrhage is evident. [place] Nims No mass lesions are evident. There are no pathologic calcifications. [place] Nims There are no pathologic fluid collections. [place] Nims The visible portions of the paranasal sinuses are clear. [place] Nims Impression IMPRESSION:  
 
Probable subacute infarct right posterior frontal region. All CT scans at this facility are performed using dose optimization technique as 
appropriate to the performed exam, to include automated exposure control, 
adjustment of the mA and/or kV according to patient's size (Including 
appropriate matching for site-specific examinations), or use of iterative 
reconstruction technique. EKG Results Procedure 720 Value Units Date/Time EKG, 12 LEAD, INITIAL [[id number]] Order Status:  Sent EKG, 12 LEAD, INITIAL [[id number]] Order Status:  Sent Assessment:  
Active Problems: 
1)  Subacute CVA right posterior frontal region 2)  Paroxysmal atrial fibrillation with RVR ([facility] (7/8/2020) 3)  Hypertension 4)  Dilated cardiomyopathy with previously recorded EF 30% 
    -elevated troponin, indeterminate troponin 5)  VALENTINA on CKD3 6)  Hypothyroidism with non compliant to medications 7)  Fall 8)  Cellulitis of leg with associate venous stasis 9)  Conjunctivitis of both eyes 10)  Hyperglycemia with T2DM 11)  Elevated CPK with recent fall, likely mild rhabdomyolysis Plan: Will admit to stroke unit, stroke order set placed. Continue with permissive hypertension at this time. MRI/MRA brain, MRA neck ordered. ASA, statin started. Neurology consult to follow up tomorrow Highly concerning for current AFib as stroke related, hence in elevated troponin, agreed to continue hep gtt. She can continue with amiodarone. Will get ECho, will have cardiology follow up for further care. Trend cardiac enzyme Gentle on IV fluid, avoid nephrotoxic medications. Note her FT4 is low, will start 100mcg IV levothyroxine, will check daily FT4, if it is in normal range, then can she can be switched back to oral dosing at 75mcg daily Check UA, check Duplex of leg. Start Ceftriaxone, will have wound care follow up ISS  
PT/OT/speech therapy 
pepcid Her mother who is in nursing home, she is the MPOA. Currently. Her sister at bedside assist in decision making but will need to reach out to her mother for further plan of care. ADDENDUM: 
ER MD notified extensive labial edema and intertrigo but no discharge on exam.  
STD check and CT pelv for infectious evaluation. Will need to follow up. Risk of deterioration:  []Low    [x]Moderate  []High Prophylaxis:  []Lovenox  []Coumadin  [x]Hep gtt  []SCDs  []H2B/PPI Disposition:  []Home w/ Family   [] PT,OT,RN   [x]SNF/LTC   []SAH/Rehab Discussed Code Status:         [x]Full Code      []DNR        
___________________________________________________ Care Plan discussed with: 
  [x]Patient   [x]Family    []ED Care Manager  [x]ED Doc   []Specialist : 
Total Time Coordinating Admission:  70    minutes   []Total Critical Care Time:    
 
Ara Arellano MD 
7/8/2020, 3:30 AM

## 2020-07-08 NOTE — HOME CARE
Chart reviewed. [facility] patient. Did not round on patient due to planned discharge to SNF. Shai Hughes LPN  
[facility] Liaison [phone]

## 2020-07-08 NOTE — PROGRESS NOTES
conducted an initial consultation and Spiritual Assessment for Kaitlyn Mitchell, who is a 71 y.o.,female. Patients Primary Language is: [place]. According to the patients EMR Baptist Affiliation is: [facility].  
 
The reason the Patient came to the hospital is:  
Patient Active Problem List  
 Diagnosis Date Noted  Paroxysmal atrial fibrillation with RVR ([facility] 07/08/2020  CVA (cerebral vascular accident) ([address] 07/08/2020  VALENTINA (acute kidney injury) ([address] 07/08/2020  Fall 07/08/2020  Conjunctivitis of both eyes 07/08/2020  Hypothyroid 07/08/2020  Cellulitis 07/08/2020  Labia enlarged 07/08/2020  Elevated troponin 07/08/2020  DDD (degenerative disc disease), lumbar 09/10/2016  Advance directive declined by patient 11/03/2015  Iron deficiency anemia, severe requiring transfusions 1/2015 01/21/2015  Hypothyroidism 11/19/2013  Hypertension 02/28/2012  Diabetes mellitus with kidney complication ([facility] 77/40/3110  Paroxysmal atrial fibrillation ([address] 02/06/2012  Congestive heart failure ([address] 02/06/2012  Mental retardation 02/06/2012  Chronic kidney disease, stage III (moderate) ([address] 02/06/2012  Dilated cardiomyopathy ([address] 02/06/2012  Scoliosis 02/06/2012 The  provided the following Interventions: 
Initiated a relationship of care and support. Explored issues of funmilayo, belief, spirituality and Episcopalian/ritual needs while hospitalized. Listened empathically. Provided information about Spiritual Care Services. Offered prayer and assurance of continued prayers on patient's behalf. Chart reviewed. The following outcomes where achieved: 
Patient shared limited information about both their medical narrative and spiritual journey/beliefs. Patient processed feeling about current hospitalization. Patient expressed gratitude for 's visit.  
 
Assessment: 
Patient does not have any Episcopalian/cultural needs that will affect patients preferences in health care. There are no spiritual or Christianity issues which require intervention at this time. Plan: 
Chaplains will continue to follow and will provide pastoral care on an as needed/requested basis.  recommends bedside caregivers page  on duty if patient shows signs of acute spiritual or emotional distress. [address] [facility] Care  
[phone]

## 2020-07-08 NOTE — PROGRESS NOTES
ARU/IPR REFERRAL CONTACT NOTE [id number] [place] [address] for Physical Rehabilitation RE:  Davi Phelps Thank you for the opportunity to review this patient's case for admission to [id number] [place] [address] for Physical Rehabilitation. Based on our pre-admission screening:  
 
Kalr ] Our Team/Medical Director is following this case. Comments: referral received. Pt pending PVLS and therapy evaluations and recommendations. Please be advised admission to ARU will be based on meeting admission requirements, authorization from [address] screen collected within five days of admission to IPR and negative result. If \"RAPID\" test would also need a concurrent [facility]/\"extended\" COVID test.   
 
 
Thank you for this referral.  
Please do not hesitate to call if you need further information or have additional questions. Regards, 
 
RYAN Gomez PTA for [facility] Admissions [facility] for Physical Rehabilitation 
[phone]

## 2020-07-08 NOTE — PROGRESS NOTES
LTSS/UAI has been completed and sent to [facility] for Level II. Pt's medicaid is active, per Medicaid hotline. Registration called to update facesheet. KANDI Holder, [place]- [phone]

## 2020-07-08 NOTE — CDMP QUERY
Patient admitted with subacute CVA, noted to have altered skin integrity. If possible, please document in progress notes and d/c summary if you are evaluating and/or treating any of the following: 
 
=> pressure ulcer of sacrum, unstageable, POA 
=> diabetic ulcer of sacrum, unstgeable, POA 
=> Other Explanation of clinical findings 
=> Clinically Undetermined (no explanation for clinical findings) The medical record reflects the following: 
   Risk Factors: contracture, fall at home prior to admission, diabetes mellitus with hyperglycemia Clinical Indicators:  
\"+leg rash, no itching, no lesions. \"  Per Dr. Pasquale Villa, H&P, 7/8/2020. \"sacrum, unstageable pressure injury, 3x2x0.1cm, soft black gray necrosis adhered on base 100%, POA Treatment: foam dressing, Envision mattress on versacare frame, Please clarify and document your clinical opinion in the progress notes and discharge summary including the definitive and/or presumptive diagnosis, (suspected or probable), related to the above clinical findings. Please include clinical findings supporting your [phone] Thank you, MARIUSZ Valladares RN, CDS [email or web address] 
Office # (7/9/2020):  [phone] Cell # for Kali Ernst RN, CDS supervisor: REFERENCE: 
 
Stage I: Intact skin with non-blanchable redness of a localized area usually over a bony prominence; darkly pigmented skin may not have visible blanching Stage II: Partial thickness loss of dermis presenting as a shallow open ulcer with a red pink wound bed, without slough - may also present as an intact or open/ruptured serum filled blister Stage III: Full thickness tissue loss; subcutaneous fat may be visible but bone, tendon or muscle are not exposed. Earma [place] may be present but does not obscur the depth of tissue loss - may include undermining and tunneling Stage IV: Full thickness tissue loss with exposed bone, tendon or muscle - slough eschar may be present on some parts of the wound bed; often includes undermining and tunneling Unstageable: Full thickness tissue loss in which the base of the ulcer is convered by slough and/or eschar *if the \"unstageable ulcer\" is \"staged\" during the admission/encounter, report the stage Suspected Deep Tissue Injury: Purple or maroon localized area of discolored intact skin or blood filled blister d/t damage of the underlying soft tissue from pressure and/or sheer. The wound may further evolve and become covered by a thin eschar. Evolution may be rapid exposing additional layers of tissue even with optimal treatment.

## 2020-07-08 NOTE — CONSULTS
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 The history is obtained from the chart as the patient is incoherent and unable to provide her own history. Reportedly she stated that she fell the other night, could not get up, but it was not clear for how long she had been in the floor. There are mentions that the last time that she was seen \"normal\" was last Friday. Patient lives alone at home, has not had primary care follow-up, and it is unclear whether she has been compliant with her medications. She was found to be in atrial fibrillation with rapid ventricular response and received intravenous Lopressor as well as amiodarone on. Her troponin was elevated. She was started on heparin. A CT of the head which I have personally reviewed is showing a \"probable subacute infarct in the right posterior frontal region. \"  An MRI of the brain with MRA of the neck and brain are currently pending. Social History Socioeconomic History  Marital status: SINGLE Spouse name: Not on file  Number of children: Not on file  Years of education: Not on file  Highest education level: Not on file Occupational History  Not on file Social Needs  Financial resource strain: Not on file  Food insecurity Worry: Not on file Inability: Not on file  Transportation needs Medical: Not on file Non-medical: Not on file Tobacco Use  Smoking status: Never Smoker  Smokeless tobacco: Never Used Substance and Sexual Activity  Alcohol use: No  
 Drug use: No  
 Sexual activity: Never Birth control/protection: None Lifestyle  Physical activity Days per week: Not on file Minutes per session: Not on file  Stress: Not on file Relationships  Social connections Talks on phone: Not on file Gets together: Not on file Attends Orthodoxy service: Not on file Active member of club or organization: Not on file Attends meetings of clubs or organizations: Not on file Relationship status: Not on file  Intimate partner violence Fear of current or ex partner: Not on file Emotionally abused: Not on file Physically abused: Not on file Forced sexual activity: Not on file Other Topics Concern  Not on file Social History Narrative  Not on file History reviewed. No pertinent family history. Current Facility-Administered Medications Medication Dose Route Frequency Provider Last Rate Last Dose  heparin 25,000 units in D5W 250 ml infusion  18-36 Units/kg/hr IntraVENous TITRATE Valeriy Reyna MD 9.1 mL/hr at 07/08/20 0417 18 Units/kg/hr at 07/08/20 0417  ondansetron (ZOFRAN) injection 4 mg  4 mg IntraVENous Q6H PRN Valeriy Reyna MD      
 aspirin chewable tablet 81 mg  81 mg Oral DAILY Valeriy Reyna MD      
 atorvastatin (LIPITOR) tablet 80 mg  80 mg Oral QHS Valeriy Reyna MD   80 mg at 07/08/20 0582  acetaminophen (TYLENOL) tablet 650 mg  650 mg Oral Q4H PRN Valeriy Renya MD      
 labetaloL (NORMODYNE;TRANDATE) 20 mg/4 mL (5 mg/mL) injection 5 mg  5 mg IntraVENous Q10MIN PRN Valeriy Reyna MD      
 senna-docusate (PERICOLACE) 8.6-50 mg per tablet 2 Tab  2 Tab Oral QHS Valeriy Reyna MD   Stopped at 07/08/20 4984  cefTRIAXone (ROCEPHIN) 1 g in sterile water (preservative free) 10 mL IV syringe  1 g IntraVENous Q24H Wicho Poe MD   1 g at 07/08/20 0400  
 insulin lispro (HUMALOG) injection   SubCUTAneous TIDAC Valeriy Reyna MD      
 glucose chewable tablet 16 g  4 Tab Oral PRN Valeriy Reyna MD      
 glucagon [facility]) injection 1 mg  1 mg IntraMUSCular PRN Valeriy Reyna MD      
 dextrose (D50W) injection syrg 12.5-25 g  25-50 mL IntraVENous PRN Valeriy Reyna MD      
  erythromycin (ILOTYCIN) 5 mg/gram (0.5 %) ophthalmic ointment   Both Eyes Q4H Matthew Jimenez MD      
 famotidine (PEPCID) tablet 20 mg  20 mg Oral DAILY Trudy Carballo MD      
 levothyroxine (SYNTHROID) injection 100 mcg  100 mcg IntraVENous DAILY Trudy Carballo MD      
 carvediloL (COREG) tablet 12.5 mg  12.5 mg Oral BID WITH MEALS Trudy Carballo MD      
 sodium chloride (NS) flush 5-10 mL  5-10 mL IntraVENous PRN Trudy Carballo MD      
 
 
Past Medical History:  
Diagnosis Date  Cardiac echocardiogram 02/09/2015 EF 64%. No WMA. Mild LVH. Gr 2 DDfx. RVSP 30 mmHg. Mod-severe LAE. No significant valvular heart disease.  Cardiac nuclear imaging test, abnormal 02/09/2015 LV apical defect likely c/w prior infarction w/sm area of distal anterior romulo-infarct ischemia. EF 57%. Apical WMA, likely from prior infarct. Nondiagnostic EKG on pharm stress test.  
 Chronic kidney disease, stage III (moderate) ([address] 2/6/2012  Chronic renal disease, stage III ([address]  Congestive heart failure ([address] 2/6/2012  Congestive heart failure, unspecified 01/2012 Echocardiogram with severe global hypokinesis and EF 30% consistent with dilated cardiomyopathy.  DDD (degenerative disc disease), lumbar 9/10/2016  Diabetes mellitus ([address] 01/2015 Presented with blood sugar of 637 and HbA1c 13.4  Diabetes mellitus with kidney complication ([address] 6/9/7669  Dilated cardiomyopathy ([address] 2/6/2012  Hypertension  Hypothyroidism  Iron deficiency anemia 01/2015 Presented with Hb 5.1/ Hct 18.0. On Xarelto. No obvious source of bleeding. Did not follow-up for outpatient GI evaluation.  Low back pain 01/21/2015 Abdominal CT scan with thoracolumbar rotoscoliosis with severe multilevel degenerative disc disease and facet arthropathy; central canal stenosis at L4-L5.  Mental retardation 2/6/2012  Paroxysmal atrial fibrillation ([address] 01/2012 Presented with atrial fibrillation with RVR; converted spontaneously to sinus rhythm during hospitalization and subsequently started on amiodarone and Xarelto. History reviewed. No pertinent surgical history. No Known Allergies Patient Active Problem List  
Diagnosis Code  Diabetes mellitus with kidney complication (HCC) W83.60  
 Paroxysmal atrial fibrillation (HCC) I48.0  Congestive heart failure (HCC) I50.9  Mental retardation F79  Chronic kidney disease, stage III (moderate) ([facility]) N18.3  Dilated cardiomyopathy (HCC) I42.0  Scoliosis M41.9  Hypertension I10  
 Hypothyroidism E03.9  Iron deficiency anemia, severe requiring transfusions 1/2015 D50.0  Advance directive declined by patient Z68.5  DDD (degenerative disc disease), lumbar M51.36  
 Paroxysmal atrial fibrillation with RVR ([facility]) I48.0  CVA (cerebral vascular accident) [facility] I63.9  VALENTINA (acute kidney injury) ([facility] N17.9  Fall W19. Safia Gunnels  Conjunctivitis of both eyes H10.9  Hypothyroid E03.9  Cellulitis L03.90  Labia enlarged N90.60  Elevated troponin R79.89 Review of Systems:  
Constitutional: no fever or chills Skin denies rash or itching HEENT:  Denies tinnitus, hearing loss, or visual changes Respiratory: denies shortness of breath Cardiovascular: denies chest pain, dyspnea on exertion Gastrointestinal: does not report nausea or vomiting Genitourinary: does not report dysuria or incontinence Musculoskeletal: does not report joint pain or swelling Endocrine: denies weight change Hematology: denies easy bruising or bleeding Neurological: as above in HPI PHYSICAL EXAMINATION:   
 
  
Vital signs:   
Visit Vitals /87 Pulse (!) 108 Temp 97.6 °F (36.4 °C) Resp 20 Ht 5' (1.524 m) Wt 49.4 kg (109 lb) SpO2 98% BMI 21.29 kg/m² GENERAL:                  Well developed, thin, in no apparent distress. HEART:                       RR, no murmurs EXTREMITIES:           No edema is identified. Pulses are +2. HEAD:                         Normocephalic, atraumatic. NEUROLOGIC EXAMINATION 
 
  
MENTAL STATUS:     Awake, alert, incoherent, tangential, very inattentive and difficult to keep in subjective. She is able to tell me her name, she has a hard time telling me that she is in [facility] view after a delay, but when I repeated this question then she is unable to produce the same answer she just recently gave me, unable to tell me who is the president, unable to tell me the year, the day of the month, the month, or the day of the week. She is not aware of why she is here in the hospital. here is no aphasia. Fund of knowledge is reduced. Mood and affect appears somewhat manic. CRANIAL NERVES:   Visual fields are full to confrontation. Pupils are reactive to light and accommodation. Uncooperative for fundi Extraocular movements are intact and there is no nystagmus. Unreliable facial sensation Face is symmetrical.  
Hearing is present. SCM/TPZ 5/5 Tongue protrudes midline, palate elevates symmetrically. MOTOR:           No obvious focal weakness, no obvious lateralizing weakness on limited examination due to inability to cooperate CEREBELLAR:           No tremors or dysmetria SENSORY:      Unreliable. DTR's:                         +2 throughout, no long tract signs GAIT:                            Deferred I have personally reviewed imaging studies. Recent Results (from the past 24 hour(s)) EKG, 12 LEAD, INITIAL Collection Time: 07/07/20 11:40 PM  
Result Value Ref Range Ventricular Rate 146 BPM  
 Atrial Rate 147 BPM  
 QRS Duration 86 ms  
 Q-T Interval 320 ms QTC Calculation (Bezet) 498 ms Calculated R Axis -76 degrees Calculated T Axis 101 degrees Diagnosis Atrial fibrillation with rapid ventricular response Left anterior fascicular block Cannot rule out Inferior infarct (masked by fascicular block?) , age  
undetermined Anteroseptal infarct , age undetermined T wave abnormality, consider lateral ischemia Abnormal ECG When compared with ECG of 21-JAN-2015 15:54, Significant changes have occurred CULTURE, BLOOD Collection Time: 07/07/20 11:50 PM  
Result Value Ref Range Special Requests: NO SPECIAL REQUESTS Culture result: NO GROWTH AFTER 6 HOURS METABOLIC PANEL, COMPREHENSIVE Collection Time: 07/07/20 11:50 PM  
Result Value Ref Range Sodium 144 136 - 145 mmol/L Potassium 5.0 3.5 - 5.5 mmol/L Chloride 112 (H) 100 - 111 mmol/L  
 CO2 26 21 - 32 mmol/L Anion gap 6 3.0 - 18 mmol/L Glucose 123 (H) 74 - 99 mg/dL BUN 78 (H) 7.0 - 18 MG/DL Creatinine 1.94 (H) 0.6 - 1.3 MG/DL  
 BUN/Creatinine ratio 40 (H) 12 - 20 GFR est AA 31 (L) >60 ml/min/1.73m2 GFR est non-AA 26 (L) >60 ml/min/1.73m2 Calcium 9.4 8.5 - 10.1 MG/DL Bilirubin, total 0.8 0.2 - 1.0 MG/DL  
 ALT (SGPT) 52 13 - 56 U/L  
 AST (SGOT) 49 (H) 10 - 38 U/L Alk. phosphatase 90 45 - 117 U/L Protein, total 6.2 (L) 6.4 - 8.2 g/dL Albumin 2.5 (L) 3.4 - 5.0 g/dL Globulin 3.7 2.0 - 4.0 g/dL A-G Ratio 0.7 (L) 0.8 - 1.7    
CBC WITH AUTOMATED DIFF Collection Time: 07/07/20 11:50 PM  
Result Value Ref Range WBC 9.4 4.6 - 13.2 K/uL  
 RBC 5.60 (H) 4.20 - 5.30 M/uL  
 HGB 18.8 (H) 12.0 - 16.0 g/dL HCT 53.1 (H) 35.0 - 45.0 % MCV 94.8 74.0 - 97.0 FL  
 MCH 33.6 24.0 - 34.0 PG  
 MCHC 35.4 31.0 - 37.0 g/dL  
 RDW 14.3 11.6 - 14.5 % PLATELET 290 647 - 383 K/uL MPV 12.9 (H) 9.2 - 11.8 FL  
 NEUTROPHILS 82 (H) 40 - 73 % LYMPHOCYTES 10 (L) 21 - 52 % MONOCYTES 8 3 - 10 % EOSINOPHILS 0 0 - 5 % BASOPHILS 0 0 - 2 %  
 ABS. NEUTROPHILS 7.7 1.8 - 8.0 K/UL  
 ABS. LYMPHOCYTES 1.0 0.9 - 3.6 K/UL  
 ABS. MONOCYTES 0.7 0.05 - 1.2 K/UL  
 ABS. EOSINOPHILS 0.0 0.0 - 0.4 K/UL ABS. BASOPHILS 0.0 0.0 - 0.1 K/UL  
 DF AUTOMATED    
CK Collection Time: 07/07/20 11:50 PM  
Result Value Ref Range  (H) 26 - 192 U/L  
TROPONIN I Collection Time: 07/07/20 11:50 PM  
Result Value Ref Range Troponin-I, QT 0.15 (H) 0.0 - 0.045 NG/ML  
TSH 3RD GENERATION Collection Time: 07/07/20 11:50 PM  
Result Value Ref Range TSH 4.90 (H) 0.36 - 3.74 uIU/mL T3, FREE Collection Time: 07/07/20 11:50 PM  
Result Value Ref Range Triiodothyronine (T3), free 0.7 (L) 2.18 - 3.98 PG/ML  
T4, FREE Collection Time: 07/07/20 11:50 PM  
Result Value Ref Range T4, Free 0.6 (L) 0.7 - 1.5 NG/DL  
PTT Collection Time: 07/07/20 11:50 PM  
Result Value Ref Range aPTT 29.1 23.0 - 36.4 SEC POC LACTIC ACID Collection Time: 07/07/20 11:53 PM  
Result Value Ref Range Lactic Acid (POC) 2.29 (HH) 0.40 - 2.00 mmol/L  
CULTURE, BLOOD Collection Time: 07/08/20 12:05 AM  
Result Value Ref Range Special Requests: NO SPECIAL REQUESTS Culture result: NO GROWTH AFTER 6 HOURS    
POC LACTIC ACID Collection Time: 07/08/20  3:36 AM  
Result Value Ref Range Lactic Acid (POC) 3.70 (HH) 0.40 - 2.00 mmol/L  
LIPID PANEL Collection Time: 07/08/20  4:20 AM  
Result Value Ref Range LIPID PROFILE Cholesterol, total 145 <200 MG/DL Triglyceride 100 <150 MG/DL  
 HDL Cholesterol 56 40 - 60 MG/DL  
 LDL, calculated 69 0 - 100 MG/DL VLDL, calculated 20 MG/DL  
 CHOL/HDL Ratio 2.6 0 - 5.0 HEMOGLOBIN A1C WITH EAG Collection Time: 07/08/20  4:20 AM  
Result Value Ref Range Hemoglobin A1c 8.6 (H) 4.2 - 5.6 % Est. average glucose 200 mg/dL METABOLIC PANEL, BASIC Collection Time: 07/08/20  4:20 AM  
Result Value Ref Range Sodium 146 (H) 136 - 145 mmol/L Potassium 5.9 (H) 3.5 - 5.5 mmol/L Chloride 115 (H) 100 - 111 mmol/L  
 CO2 26 21 - 32 mmol/L Anion gap 5 3.0 - 18 mmol/L Glucose 122 (H) 74 - 99 mg/dL  BUN 80 (H) 7.0 - 18 MG/DL  
 Creatinine 1.88 (H) 0.6 - 1.3 MG/DL  
 BUN/Creatinine ratio 43 (H) 12 - 20 GFR est AA 33 (L) >60 ml/min/1.73m2 GFR est non-AA 27 (L) >60 ml/min/1.73m2 Calcium 9.3 8.5 - 10.1 MG/DL  
NT-PRO BNP Collection Time: 07/08/20  4:20 AM  
Result Value Ref Range NT pro-BNP 28,277 (H) 0 - 900 PG/ML  
CARDIAC PANEL,(CK, CKMB & TROPONIN) Collection Time: 07/08/20  4:20 AM  
Result Value Ref Range CK - MB 17.9 (H) <3.6 ng/ml CK-MB Index CALCULATION NOT PERFORMED WHEN CPK IS <10 0.0 - 4.0 % CK <7 (L) 26 - 192 U/L Troponin-I, QT 0.12 (H) 0.0 - 0.045 NG/ML  
MYOGLOBIN, UR, SCREEN Collection Time: 07/08/20  4:30 AM  
Result Value Ref Range Myoglobin,urine screen Positive (A) NEG    
URINALYSIS W/ RFLX MICROSCOPIC Collection Time: 07/08/20  4:35 AM  
Result Value Ref Range Color YELLOW Appearance CLEAR Specific gravity 1.020 1.005 - 1.030    
 pH (UA) 5.0 5.0 - 8.0 Protein 100 (A) NEG mg/dL Glucose Negative NEG mg/dL Ketone Negative NEG mg/dL Bilirubin Negative NEG Blood SMALL (A) NEG Urobilinogen 1.0 0.2 - 1.0 EU/dL Nitrites Negative NEG Leukocyte Esterase Negative NEG    
URINE MICROSCOPIC ONLY Collection Time: 07/08/20  4:35 AM  
Result Value Ref Range WBC 0 to 5 0 - 4 /hpf  
 RBC 0 to 2 0 - 5 /hpf Hyaline cast 0 to 2 0 - 2 /lpf WET PREP Collection Time: 07/08/20  5:10 AM  
Result Value Ref Range Special Requests: NO SPECIAL REQUESTS Wet prep NO YEAST,TRICHOMONAS OR CLUE CELLS NOTED    
GLUCOSE, POC Collection Time: 07/08/20  6:58 AM  
Result Value Ref Range Glucose (POC) 104 70 - 110 mg/dL Impression: Encephalopathy, unclear etiology, history has gaps, at any rate the clinical presentation does not appear to be convincingly secondary to cerebral ischemia.   The CT finding of \"probable\" infarction is far from definitive or even explanatory given the fact that she has other issues including hypothyroidism, uremia, potentially a yeast UTI, and uncontrolled diabetes as factors. It is possible that this is more of a social issue of a patient with intellectual deficits that may not be able to live alone. Plan: 1continue treatment of the above metabolic derangements. 2MRI of the brain with MRA of the head and neck are pending. 3added a tox screen, vitamin B12 to her labs. [facility] will follow PLEASE NOTE:  
Portions of this document may have been produced using voice recognition software. Unrecognized errors in transcription may be present. This note will not be viewable in [address]

## 2020-07-08 NOTE — CONSULTS
[facility] - Consult Note Consultation request by Jessica Blas MD for advice/opinion related to evaluating A-fib ([facility] [address] [I48.91] CVA (cerebral vascular accident) ([facility] [address] [I63.9] VALENTINA (acute kidney injury) ([facility] [address] [N17.9] Date of  Admission: 7/7/2020 11:19 PM  
Primary Care Physician:  Santa Ewing MD 
 
 Assessment:  
 
Patient Active Problem List  
Diagnosis Code  Diabetes mellitus with kidney complication (HCC) K71.65  
 Paroxysmal atrial fibrillation (HCC) I48.0  Congestive heart failure (HCC) I50.9  Mental retardation F79  Chronic kidney disease, stage III (moderate) (HCC) N18.3  Dilated cardiomyopathy (HCC) I42.0  Scoliosis M41.9  Hypertension I10  
 Hypothyroidism E03.9  Iron deficiency anemia, severe requiring transfusions 1/2015 D50.0  Advance directive declined by patient Z68.5  DDD (degenerative disc disease), lumbar M51.36  
 Paroxysmal atrial fibrillation with RVR (HCC) I48.0  CVA (cerebral vascular accident) ([facility] [address] I63.9  VALENTINA (acute kidney injury) ([facility] [address] N17.9  Fall W19. Winsome Hammans  Conjunctivitis of both eyes H10.9  Hypothyroid E03.9  Cellulitis L03.90  Labia enlarged N90.60  Elevated troponin R79.89  
 
-CVA, h/o TIAs. Found on floor by family. Probable subacute infarct right posterior frontal region by head CT.  
-Paroxysmal atrial fibrillation. Previously on amiodarone and coreg. Appears to be off all outpatient medications. Not anticoagulated given history of significant remote GIB. -Troponin indeterminate and not consistent with ACS. ECG with nonspecific ST changes, worsened in lateral leads in setting of tachycardia. -Dilated CMY presumed due to hypertensive heart disease with EF 30% 2012 improved to 64% 2015. Nuclear stress 2015 without significant ischemia. Ejection fraction in the 25% range on echocardiogram today with four-chamber  dilatation. Patient appears to be compensated without volume overload. -H/o GIB while on Xarelto, significant bleed with Hgb of 4.6 2015, but unclear source of bleeding. 
-Hypertension. Labile this admission. 
-Diabetes mellitus. HgbA1c 8.6. 
-Acute on chronic kidney disease with hyperkalemia. -Hypothyroidism. Previously on synthroid. -Mental retardation, lives alone. 
-Noncompliance which appears to be due to lack of support. Remotely followed by Dr. Rupert Loja 2015. Plan: Will load with IV amiodarone. Patient is currently continued on heparin infusion and ASA. Will await further imaging and neurologic recommendations. Long term [address] is recommended from cardiac standpoint if able to tolerate from bleeding/GI standpoint. Continue BB if ok from neuro/BP standpoint and if compliance can be assured. Can add digoxin if heart rates difficult to control after adding amiodarone. No need for diuretics at this time. Disposition appears to be a major issue as it appears there is lack of home support. History of Present Illness: This is a 71 y.o. female admitted for A-fib [facility]) [I48.91] CVA (cerebral vascular accident) ([facility] [address] [I63.9] VALENTINA (acute kidney injury) ([facility] [address] [N17.9]. Patient complains of:  Found on floor. Patient is a 71year old female who cannot provide history. Patient apparently lives at home alone. She was found on floor by family. It is unclear how long she was down. Patient was brought to ER. Patient is admitted with Subacute CVA right posterior frontal region. Patient is found to have afib with RVR, patient has history of PAF. Patient also had indeterminate troponin. Patient was started on heparin infusion on admission. Patient remains in afib with -130. Patient unable to provide history. Cardiac risk factors: dyslipidemia, diabetes mellitus, hypertension Review of Symptoms:   
Patient unable to provide history Past Medical History:  
 
Past Medical History:  
Diagnosis Date  Cardiac echocardiogram 02/09/2015 EF 64%. No WMA. Mild LVH. Gr 2 DDfx. RVSP 30 mmHg. Mod-severe LAE. No significant valvular heart disease.  Cardiac nuclear imaging test, abnormal 02/09/2015 LV apical defect likely c/w prior infarction w/sm area of distal anterior romulo-infarct ischemia. EF 57%. Apical WMA, likely from prior infarct. Nondiagnostic EKG on pharm stress test.  
 Chronic kidney disease, stage III (moderate) ([address] 2/6/2012  Chronic renal disease, stage III ([address]  Congestive heart failure ([address] 2/6/2012  Congestive heart failure, unspecified 01/2012 Echocardiogram with severe global hypokinesis and EF 30% consistent with dilated cardiomyopathy.  DDD (degenerative disc disease), lumbar 9/10/2016  Diabetes mellitus ([address] 01/2015 Presented with blood sugar of 637 and HbA1c 13.4  Diabetes mellitus with kidney complication ([address] 1/9/6622  Dilated cardiomyopathy ([address] 2/6/2012  Hypertension  Hypothyroidism  Iron deficiency anemia 01/2015 Presented with Hb 5.1/ Hct 18.0. On Xarelto. No obvious source of bleeding. Did not follow-up for outpatient GI evaluation.  Low back pain 01/21/2015 Abdominal CT scan with thoracolumbar rotoscoliosis with severe multilevel degenerative disc disease and facet arthropathy; central canal stenosis at L4-L5.  Mental retardation 2/6/2012  Paroxysmal atrial fibrillation ([address] 01/2012 Presented with atrial fibrillation with RVR; converted spontaneously to sinus rhythm during hospitalization and subsequently started on amiodarone and Xarelto. Social History:  
 
Social History Socioeconomic History  Marital status: SINGLE Spouse name: Not on file  Number of children: Not on file  Years of education: Not on file  Highest education level: Not on file Tobacco Use  Smoking status: Never Smoker  Smokeless tobacco: Never Used Substance and Sexual Activity  Alcohol use: No  
 Drug use:  No  
  Sexual activity: Never Birth control/protection: None Family History:  
History reviewed. No pertinent family history. Medications:  
No Known Allergies Current Facility-Administered Medications Medication Dose Route Frequency  heparin 25,000 units in D5W 250 ml infusion  18-36 Units/kg/hr IntraVENous TITRATE  ondansetron (ZOFRAN) injection 4 mg  4 mg IntraVENous Q6H PRN  
 aspirin chewable tablet 81 mg  81 mg Oral DAILY  atorvastatin (LIPITOR) tablet 80 mg  80 mg Oral QHS  acetaminophen (TYLENOL) tablet 650 mg  650 mg Oral Q4H PRN  
 labetaloL (NORMODYNE;TRANDATE) 20 mg/4 mL (5 mg/mL) injection 5 mg  5 mg IntraVENous Q10MIN PRN  
 senna-docusate (PERICOLACE) 8.6-50 mg per tablet 2 Tab  2 Tab Oral QHS  cefTRIAXone (ROCEPHIN) 1 g in sterile water (preservative free) 10 mL IV syringe  1 g IntraVENous Q24H  
 insulin lispro (HUMALOG) injection   SubCUTAneous TIDAC  glucose chewable tablet 16 g  4 Tab Oral PRN  
 glucagon (GLUCAGEN) injection 1 mg  1 mg IntraMUSCular PRN  
 dextrose (D50W) injection syrg 12.5-25 g  25-50 mL IntraVENous PRN  
 erythromycin (ILOTYCIN) 5 mg/gram (0.5 %) ophthalmic ointment   Both Eyes Q4H  
 famotidine (PEPCID) tablet 20 mg  20 mg Oral DAILY  levothyroxine (SYNTHROID) injection 100 mcg  100 mcg IntraVENous DAILY  carvediloL (COREG) tablet 12.5 mg  12.5 mg Oral BID WITH MEALS  sodium chloride (NS) flush 5-10 mL  5-10 mL IntraVENous PRN Physical Exam:  
 
Visit Vitals /79 (BP 1 Location: Left arm, BP Patient Position: Supine) Pulse (!) 58 Temp 97.5 °F (36.4 °C) Resp 19 Ht 5' (1.524 m) Wt 49.4 kg (109 lb) SpO2 99% BMI 21.29 kg/m² BP Readings from Last 3 Encounters:  
07/08/20 111/79  
09/28/16 146/78  
09/06/16 140/86 Pulse Readings from Last 3 Encounters:  
07/08/20 (!) 58  
09/28/16 61  
09/06/16 68 Wt Readings from Last 3 Encounters:  
07/08/20 49.4 kg (109 lb)  
09/28/16 56.2 kg (124 lb) 09/06/16 55.1 kg (121 lb 8 oz) General:  no distress, appears stated age Neck:  no JVD Lungs:  clear to auscultation bilaterally Heart:  irregularly irregular rhythm Abdomen:  abdomen is soft without significant tenderness, masses, organomegaly or guarding Extremities:  extremities normal, atraumatic, no cyanosis or edema Skin: Warm and dry. no hyperpigmentation, vitiligo, or suspicious lesions Neuro: alert Psych: non focal 
 
 Data Review:  
 
Recent Labs  
  07/07/20 
2350 WBC 9.4 HGB 18.8* HCT 53.1*  
 Recent Labs  
  07/08/20 
0420 07/07/20 
2350 * 144  
K 5.9* 5.0  
* 112* CO2 26 26 * 123* BUN 80* 78* CREA 1.88* 1.94* CA 9.3 9.4 ALB  --  2.5* ALT  --  52 Results for orders placed or performed during the hospital encounter of 07/07/20 EKG, 12 LEAD, INITIAL Result Value Ref Range Ventricular Rate 146 BPM  
 Atrial Rate 147 BPM  
 QRS Duration 86 ms  
 Q-T Interval 320 ms QTC Calculation (Bezet) 498 ms Calculated R Axis -76 degrees Calculated T Axis 101 degrees Diagnosis Atrial fibrillation with rapid ventricular response Left anterior fascicular block Cannot rule out Inferior infarct (masked by fascicular block?) , age  
undetermined Anteroseptal infarct , age undetermined T wave abnormality, consider lateral ischemia Abnormal ECG When compared with ECG of 21-JAN-2015 15:54, Significant changes have occurred Results for orders placed or performed in visit on 01/27/15 AMB POC EKG ROUTINE W/ 12 LEADS, INTER & REP Narrative See note All Cardiac Markers in the last 24 hours:   
Lab Results Component Value Date/Time CPK <7 (L) 07/08/2020 04:20 AM  
  (H) 07/07/2020 11:50 PM  
 CKMB 17.9 (H) 07/08/2020 04:20 AM  
 CKND1 CALCULATION NOT PERFORMED WHEN CPK IS <10 07/08/2020 04:20 AM  
 TROIQ 0.12 (H) 07/08/2020 04:20 AM  
 TROIQ 0.15 (H) 07/07/2020 11:50 PM  
 
 
Last Lipid: Lab Results Component Value Date/Time Cholesterol, total 145 07/08/2020 04:20 AM  
 HDL Cholesterol 56 07/08/2020 04:20 AM  
 LDL, calculated 69 07/08/2020 04:20 AM  
 Triglyceride 100 07/08/2020 04:20 AM  
 CHOL/HDL Ratio 2.6 07/08/2020 04:20 AM  
 
 
Signed By: RONALDO Flores   
 July 8, 2020 Oswaldo Gomez MD

## 2020-07-08 NOTE — WOUND CARE
Physical Exam 
Eyes: Musculoskeletal:  
     Arms: 
 
     Legs: 
 
 
Room 417: wound assess Discussed care with primary nurse Shady Tyson. Will write for topical treatment orders with Vitamin A&D oint as legs do not look pressure related. Envision mattress on [employer] frame ordered for delivery today, lift team schedule, heel prevention boots orders written for staff to start & continue. Will turn over care to nursing staff at this time.  
Christiane MORRISON, RN, [facility], [address]

## 2020-07-08 NOTE — PROGRESS NOTES
Problem: Dysphagia (Adult) Goal: *Acute Goals and Plan of Care (Insert Text) Description: Patient will: 1. Tolerate PO trials with 0 s/s overt distress in 4/5 trials 2. Utilize compensatory swallow strategies/maneuvers (decrease bite/sip, size/rate, alt. liq/sol) with min cues in 4/5 trials Recommend:  
Puree, thin liquids Meds in puree May require assistance with intake Aspiration precautions HOB >45 degrees during all intake and for at least 30 min after po Small bites/sips, slow rate of intake, alternating bites/sips Oral care three times daily Outcome: Progressing Towards Goal 
 
SPEECH LANGUAGE PATHOLOGY BEDSIDE SWALLOW EVALUATION Patient: Jose Luis Christian (28 y.o. female) Date: 7/8/2020 Primary Diagnosis: A-fib ([facility] [address] [I48.91] CVA (cerebral vascular accident) ([facility] [address] [I63.9] VALENITNA (acute kidney injury) ([facility] [address] [N17.9] Precautions: Aspiration PLOF: As per H&P 
 
ASSESSMENT : 
Based on the objective data described below, the patient presents with mod oral dysphagia. Pt alert but confused and anxious, oriented to person only. Oral mech exam revealed decreased orolingual strength/control (? Unsure if this is pt's baseline). Pt tolerating thin liquids + straw via >6 consecutive swallows with timely swallow initiation, adequate laryngeal elevation to palpation and no overt s/sx aspiration. Pt with incomplete mastication with >50% lingual spread post swallow with mech soft trials requiring max cues for multiple liquid washes to clear. Eventually removed with toothette. Pt demo improved tolerance/clearance of puree items. Recommend initiate puree diet with thin liquids with meds in puree with use of the above mentioned compensatory strategies/aspiration precautions. Will follow for further dysphagia management as indicated. Discussed with pt and RN. PLAN : 
Recommendations and Planned Interventions: As above Frequency/Duration: Patient will be followed by speech-language pathology 1-2 times per day/4-7 days per week to address goals. Discharge Recommendations: To Be Determined SUBJECTIVE:  
[facility]  OBJECTIVE:  
 
Past Medical History:  
Diagnosis Date Cardiac echocardiogram 02/09/2015 EF 64%. No WMA. Mild LVH. Gr 2 DDfx. RVSP 30 mmHg. Mod-severe LAE. No significant valvular heart disease. Cardiac nuclear imaging test, abnormal 02/09/2015 LV apical defect likely c/w prior infarction w/sm area of distal anterior romulo-infarct ischemia. EF 57%. Apical WMA, likely from prior infarct. Nondiagnostic EKG on pharm stress test.  
 Chronic kidney disease, stage III (moderate) ([address] 2/6/2012 Chronic renal disease, stage III ([address] Congestive heart failure ([address] 2/6/2012 Congestive heart failure, unspecified 01/2012 Echocardiogram with severe global hypokinesis and EF 30% consistent with dilated cardiomyopathy. DDD (degenerative disc disease), lumbar 9/10/2016 Diabetes mellitus ([address] 01/2015 Presented with blood sugar of 637 and HbA1c 13.4 Diabetes mellitus with kidney complication ([address] 8/3/5952 Dilated cardiomyopathy ([address] 2/6/2012 Hypertension Hypothyroidism Iron deficiency anemia 01/2015 Presented with Hb 5.1/ Hct 18.0. On Xarelto. No obvious source of bleeding. Did not follow-up for outpatient GI evaluation. Low back pain 01/21/2015 Abdominal CT scan with thoracolumbar rotoscoliosis with severe multilevel degenerative disc disease and facet arthropathy; central canal stenosis at L4-L5. Mental retardation 2/6/2012 Paroxysmal atrial fibrillation ([address] 01/2012 Presented with atrial fibrillation with RVR; converted spontaneously to sinus rhythm during hospitalization and subsequently started on amiodarone and Xarelto. History reviewed. No pertinent surgical history. Prior Level of Function/Home Situation: Unknown Diet prior to admission: Unknown Current Diet:  NPO; recommend initiate puree, thin liquids Cognitive and Communication Status: 
Neurologic State: Confused Orientation Level: Oriented to person, Disoriented to time, Disoriented to situation, Disoriented to place Cognition: Poor safety awareness, Decreased command following, Decreased attention/concentration Oral Assessment: 
Oral Assessment Labial: No impairment Dentition: Limited;Natural 
Oral Hygiene: Davene Halim Lingual: Incoordinated Velum: No impairment Mandible: No impairment P.O. Trials: 
Patient Position: [facility] at [facility] Vocal quality prior to P.O.: No impairment Consistency Presented: Thin liquid;Mechanical soft;Puree How Presented: Self-fed/presented; Successive swallows;Spoon;Straw Bolus Acceptance: No impairment Bolus Formation/Control: Impaired Type of Impairment: Mastication;Delayed; Posterior; Incomplete(With mech soft ) Propulsion: Delayed (# of seconds); Absent; Discoordination Oral Residue: Greater than 50% of bolus(With mech soft ) Initiation of Swallow: No impairment Laryngeal Elevation: Functional 
Aspiration Signs/Symptoms: None Pharyngeal Phase Characteristics: No impairment, issues, or problems Effective Modifications: Small sips and bites; Alternate liquids/solids Cues for Modifications: Maximal 
Oral Phase Severity: Moderate Pharyngeal Phase Severity : No impairment PAIN: 
Start of Eval: unable to verbalize due to confusion End of Eval: unable to verbalize due to confusion After treatment:  
[]            Patient left in no apparent distress sitting up in chair 
[x]            Patient left in no apparent distress in bed 
[x]            Call bell left within reach [x]            Nursing notified []            Family present 
[]            Caregiver present 
[]            Bed alarm activated COMMUNICATION/EDUCATION:  
[x]            Aspiration precautions; swallow safety; compensatory techniques. []            Patient/family have participated as able in goal setting and plan of care. []            Patient/family agree to work toward stated goals and plan of care. []            Patient understands intent and goals of therapy; neutral about participation. [x]            Patient unable to participate in goal setting/plan of care; educ ongoing with interdisciplinary staff [x]         Posted safety precautions in patient's room. Thank you for this referral, Mike Coffman M.S., CCC-SLP Speech-Language Pathologist

## 2020-07-08 NOTE — PROGRESS NOTES
Pt had 7 beats of vtach  to 188, at 1744. I also got a new IV in her right hand, and did the loading dose and the initial dose of 1mg/min for 6 hours started. Pt is very stressed out most of the time, I spent a lot of time with her today and I thinks he needs something for pain before any wound orders or the dobson. The ativan seemed to help with agitation and she was able to tolerate a blood draw and a IV easier but when I tried to even touch her wounds she screams and wakes from her rest. 
 
I am paging the provider to ask for pain medicine for wound care orders/ dobson; if that doesn't help she may need temp restraints for anything invasive.  
 
Roseline Talavera

## 2020-07-08 NOTE — PROGRESS NOTES
Discussed with pt's sister Clau Crenshaw. Per Megan Freeman, pt has been living by herself since their mom left the house. She goes there to check up on pt and provides food. She stated \"My mom left me a house that is falling apart and my sister that I can't take care of. I am very overwhelmed right now and she cannot come back home. \"   This writer informed Megan Freeman that with pt's mental status pt should not be left alone and will always need assistance with ADLs/IADLs. Per Megan Freeman, pt has not seen her pcp for a while. She stated she tried once and pt refused and she just left her alone. Megan Freeman is requesting for the hospital to assist in finding pt a place to live. Kalli Minus options and answered her questions. Discussed Healthcare Advance Directives with Megan Freeman and she was agreeable to be pt's POA. Per Megan Freeman, pt ca sign documents. [facility]. KIM ArthurN RN Care Management Pager: [phone]

## 2020-07-08 NOTE — ROUTINE PROCESS
Informed RN Margie, unable to complete vascular exam, patient would not tolerate. She is on her way to [facility] to attempt exam there. Please inform RN.

## 2020-07-08 NOTE — ED PROVIDER NOTES
EMERGENCY DEPARTMENT HISTORY AND PHYSICAL EXAM 
 
12:38 AM 
Date: 7/7/2020 Patient Name: Nirmala Kinsey History of Presenting Illness Chief Complaint Patient presents with  Fall History Provided By: Patient HPI: Nirmala Kinsey is a 71 y.o. female with history multiple medical problems as below including interpretation. Patient was brought in by ambulance after she was found down by her family members. She was found laying on the floor alert and awake. Unknown how long she has been there. It is very difficult to obtain history from the patient due to her mental status which is at baseline per the paramedics. She reports that she is scared and is asking for her sisters. She is able to tell me her name and that she lives alone but she is very inattentive and cannot answer my questions. Denies pain. Location: 
Severity: 
Timing/course: Onset/Duration: PCP: Agatha Martinez MD 
 
Past History Past Medical History: 
Past Medical History:  
Diagnosis Date  Cardiac echocardiogram 02/09/2015 EF 64%. No WMA. Mild LVH. Gr 2 DDfx. RVSP 30 mmHg. Mod-severe LAE. No significant valvular heart disease.  Cardiac nuclear imaging test, abnormal 02/09/2015 LV apical defect likely c/w prior infarction w/sm area of distal anterior romulo-infarct ischemia. EF 57%. Apical WMA, likely from prior infarct. Nondiagnostic EKG on pharm stress test.  
 Chronic renal disease, stage III  Congestive heart failure, unspecified 01/2012 Echocardiogram with severe global hypokinesis and EF 30% consistent with dilated cardiomyopathy.  Diabetes mellitus ([facility] [address] 01/2015 Presented with blood sugar of 637 and HbA1c 13.4  Hypertension  Hypothyroidism  Iron deficiency anemia 01/2015 Presented with Hb 5.1/ Hct 18.0. On Xarelto. No obvious source of bleeding. Did not follow-up for outpatient GI evaluation.  Low back pain 01/21/2015 Abdominal CT scan with thoracolumbar rotoscoliosis with severe multilevel degenerative disc disease and facet arthropathy; central canal stenosis at L4-L5.  Mental retardation 2/6/2012  Paroxysmal atrial fibrillation ([address] 01/2012 Presented with atrial fibrillation with RVR; converted spontaneously to sinus rhythm during hospitalization and subsequently started on amiodarone and Xarelto. Past Surgical History: No past surgical history on file. Family History: No family history on file. Social History: 
Social History Tobacco Use  Smoking status: Never Smoker  Smokeless tobacco: Never Used Substance Use Topics  Alcohol use: No  
 Drug use: No  
 
 
Allergies: 
No Known Allergies Review of Systems Review of Systems Unable to perform ROS: Other (Mental retardation) Physical Exam  
 
Patient Vitals for the past 12 hrs: 
 Temp Pulse Resp BP SpO2  
07/07/20 2359 98.2 °F (36.8 °C) (!) 150 17 153/90 97 % Physical Exam 
Vitals signs and nursing note reviewed. Constitutional:   
   General: She is not in acute distress. Comments: Disheveled HENT:  
   Head: Normocephalic and atraumatic. Nose: Nose normal.  
   Mouth/Throat:  
   Mouth: Mucous membranes are dry. Eyes:  
   General:     
   Right eye: Discharge present. Left eye: Discharge present. Neck: Musculoskeletal: Normal range of motion and neck supple. Cardiovascular:  
   Rate and Rhythm: Tachycardia present. Pulses: Normal pulses. Pulmonary:  
   Effort: Pulmonary effort is normal. No respiratory distress. Abdominal:  
   General: There is no distension. Palpations: Abdomen is soft. Musculoskeletal:     
   General: No swelling. Skin: 
   General: Skin is warm and dry. Comments: Diffuse crusting rash over the lower extremities, likely seborrheic eczema Neurological:  
   General: No focal deficit present. Mental Status: She is alert. Mental status is at baseline. Psychiatric:     
   Attention and Perception: She is inattentive. Mood and Affect: Mood is anxious. Affect is labile and tearful. Speech: Speech is tangential.     
   Behavior: Behavior is uncooperative and hyperactive. Cognition and Memory: Cognition is impaired. Diagnostic Study Results Labs - Recent Results (from the past 12 hour(s)) METABOLIC PANEL, COMPREHENSIVE Collection Time: 07/07/20 11:50 PM  
Result Value Ref Range Sodium 144 136 - 145 mmol/L Potassium 5.0 3.5 - 5.5 mmol/L Chloride 112 (H) 100 - 111 mmol/L  
 CO2 26 21 - 32 mmol/L Anion gap 6 3.0 - 18 mmol/L Glucose 123 (H) 74 - 99 mg/dL BUN 78 (H) 7.0 - 18 MG/DL Creatinine 1.94 (H) 0.6 - 1.3 MG/DL  
 BUN/Creatinine ratio 40 (H) 12 - 20 GFR est AA 31 (L) >60 ml/min/1.73m2 GFR est non-AA 26 (L) >60 ml/min/1.73m2 Calcium 9.4 8.5 - 10.1 MG/DL Bilirubin, total 0.8 0.2 - 1.0 MG/DL  
 ALT (SGPT) 52 13 - 56 U/L  
 AST (SGOT) 49 (H) 10 - 38 U/L Alk. phosphatase 90 45 - 117 U/L Protein, total 6.2 (L) 6.4 - 8.2 g/dL Albumin 2.5 (L) 3.4 - 5.0 g/dL Globulin 3.7 2.0 - 4.0 g/dL A-G Ratio 0.7 (L) 0.8 - 1.7    
CBC WITH AUTOMATED DIFF Collection Time: 07/07/20 11:50 PM  
Result Value Ref Range WBC 9.4 4.6 - 13.2 K/uL  
 RBC 5.60 (H) 4.20 - 5.30 M/uL  
 HGB 18.8 (H) 12.0 - 16.0 g/dL HCT 53.1 (H) 35.0 - 45.0 % MCV 94.8 74.0 - 97.0 FL  
 MCH 33.6 24.0 - 34.0 PG  
 MCHC 35.4 31.0 - 37.0 g/dL  
 RDW 14.3 11.6 - 14.5 % PLATELET 436 901 - 918 K/uL MPV 12.9 (H) 9.2 - 11.8 FL  
 NEUTROPHILS 82 (H) 40 - 73 % LYMPHOCYTES 10 (L) 21 - 52 % MONOCYTES 8 3 - 10 % EOSINOPHILS 0 0 - 5 % BASOPHILS 0 0 - 2 %  
 ABS. NEUTROPHILS 7.7 1.8 - 8.0 K/UL  
 ABS. LYMPHOCYTES 1.0 0.9 - 3.6 K/UL  
 ABS. MONOCYTES 0.7 0.05 - 1.2 K/UL  
 ABS. EOSINOPHILS 0.0 0.0 - 0.4 K/UL  
 ABS. BASOPHILS 0.0 0.0 - 0.1 K/UL DF AUTOMATED    
CK Collection Time: 07/07/20 11:50 PM  
Result Value Ref Range  (H) 26 - 192 U/L  
POC LACTIC ACID Collection Time: 07/07/20 11:53 PM  
Result Value Ref Range Lactic Acid (POC) 2.29 (HH) 0.40 - 2.00 mmol/L Radiologic Studies - No results found. Medical Decision Making ED Course: Progress Notes, Reevaluation, and Consults: 
 
12:38 AM Initial assessment performed. The patients presenting problems have been discussed, and they/their family are in agreement with the care plan formulated and outlined with them. I have encouraged them to ask questions as they arise throughout their visit. Provider Notes (Medical Decision Making): 41-year-old female with multiple medical problems presenting after being found down by family members. Patient is alert and awake and able to answer some questions regarding her name and orientation. She looks pretty disheveled, very poor hygiene and purulent discharge from both eyes with some surrounding erythema. Likely bacterial conjunctivitis. She also has crusting all over her lower extremities without signs of erythema or induration. Patient is also in A. fib with RVR. She told me that she does not have any meds at home, unclear if she has been taking her medications or not. Sepsis bundle was initiated due to limited history. Trial of IV Lopressor, will follow with p.o. Lopressor if it was successful. Hold off on antibiotics until we find a source. Hold off on the fluid bolus since she is not hypotensive or hemodynamically unstable and she does have history of CHF with an EF of less than 30%. Also get a head CT to evaluate for traumatic injuries. Patient sister was at bedside and she told me that her mental status and behavior is same as always. She has been living alone for a while and not been taking any medications are following up.   Given that A. fib with RVR as well as the subacute stroke the decision was made to admit her. I discussed the case with Dr. Cary Eduardo the hospitalist and we both agreed that the patient will need heparin drip. We reviewed her chart and she did have GI bleed in the past however we think the stroke is most likely from her A. fib with RVR that is not controlled so we will start her on a heparin drip. She is not currently anemic or having evidence of GI hemorrhage. I will also start her on her home amio and erythromycin for her eyes. 5:17 AM 
I was called by the bedside nurse to evaluate the patient perineal area for extensive labial edema and intertrigo. Patient had an irregular on exam and her labia is definitely diffusely swollen and and erythematous. There is no obvious discharge and the patient was not able to tolerate the speculum exam so we did swabs for wet prep and GC chlamydia especially in the presence of purulent discharge from both eyes. Since the patient has developmental delays I would be concerned about chely an STD. I will also obtain a pelvic CT to evaluate for pelvic infection given the extensive edema. She would not be able to get IV contrast.  This was also discussed with the hospitalist. 
 
Procedures:  
 
Critical Care Time: Upon my evaluation, this patient had a high probability of imminent or life-threatening deterioration due to A. fib with RVR, which required my direct attention, intervention, and personal management. I have personally provided 35 minutes of critical care time exclusive of time spent on separately billable procedures. Time includes review of laboratory data, radiology results, discussion with consultants, and monitoring for potential decompensation. Interventions were performed as documented above. Matthew Jimenez MD 
4:40 AM 
 
 
 
Vital Signs-Reviewed the patient's vital signs. Reviewed pt's pulse ox reading. EKG: Interpreted by the EP. Time Interpreted:  
 Rate: Rhythm:  
 Interpretation: 
 Comparison:  
 
Records Reviewed: Nursing Notes (Time of Review: 12:38 AM) 
-I am the first provider for this patient. 
-I reviewed the vital signs, available nursing notes, past medical history, past surgical history, family history and social history. Current Facility-Administered Medications Medication Dose Route Frequency Provider Last Rate Last Dose  lactated ringers bolus infusion 500 mL  500 mL IntraVENous ONCE Matthew Jimenez MD 1,000 mL/hr at 07/08/20 0025 500 mL at 07/08/20 0025  
 midazolam (VERSED) injection 1 mg  1 mg IntraVENous ONCE Matthew Jimenez MD      
 sodium chloride (NS) flush 5-10 mL  5-10 mL IntraVENous PRN Matthew Jimenez MD      
 
Current Outpatient Medications Medication Sig Dispense Refill  lisinopril (PRINIVIL, ZESTRIL) 20 mg tablet Take 1 Tab by mouth daily. 90 Tab 1  
 SITagliptin-metFORMIN (JANUMET)  mg per tablet TAKE 1 TABLET BY MOUTH TWO (2) TIMES DAILY WITH MEALS. 180 Tab 3  furosemide (LASIX) 40 mg tablet TAKE 1 TABLET DAILY 90 Tab 3  
 amiodarone (CORDARONE) 200 mg tablet TAKE 1 TABLET DAILY 90 Tab 3  
 traMADol (ULTRAM) 50 mg tablet Take 1/2-1 tab po daily-tid prn pain 270 Tab 0  
 LORazepam (ATIVAN) 0.5 mg tablet TAKE 1 TABLET BY MOUTH EVERY 8 HOURS AS NEEDED FOR ANXIETY 25 Tab 0  
 COMPOUNDMAX BASE crea 240 g by Does Not Apply route four (4) times daily. Anti-Inflam Meloxicam 0.09% Topirmate 1% Methocarbamol 2%  Lidocaine 2% Prilocaine 2% 240 g 3  
 levothyroxine (SYNTHROID) 75 mcg tablet TAKE 1 TABLET BY MOUTH DAILY BEFORE BREAKFAST 90 Tab 3  pantoprazole (PROTONIX) 40 mg tablet Take 1 tablet by mouth daily. 30 tablet 1  carvedilol (COREG) 6.25 mg tablet Take 1 tablet by mouth two (2) times a day. 60 tablet 1  
 triamcinolone acetonide (KENALOG) 0.1 % topical cream Apply  to affected area two (2) times a day.  use thin layer 15 g 0  
  mupirocin (BACTROBAN) 2 % ointment Apply  to affected area daily.  Lancets Misc Use tid 100 Each 11 Clinical Impression Clinical Impression: No diagnosis found. Disposition: Admit This note was dictated utilizing voice recognition software which may lead to typographical errors. I apologize in advance if the situation occurs. If questions arise please do not hesitate to contact me or call our department.  
 
Fannie Joel MD 
12:38 AM

## 2020-07-08 NOTE — PROGRESS NOTES
Admit Date: 7/7/2020 Date of Service: 7/8/2020 Reason for follow-up: Syncope Assessment: CVA with a history of TIA -admitted because she was found on the floor by family. Evaluated by neurology. On aspirin Lipitor beta-blocker heparin drip. Suspect subacute infarct of the right posterior frontal region Paroxysmal atrial fibrillation: Previously on amiodarone and Coreg but was noncompliant with medications Indeterminate troponins: Audiology following, EKG with nonspecific ST changes. electrolyte abnormalities: Hyponatremia, hyperkalemia VAL[place]INA: Slightly improved with IV fluid hydration Multiple skin lesions and areas of pressure injury: Please refer to wound care consult note for detailed description of location of each of the 10+ wounds. S several of the skin lesions are possibly indicative of vitamin deficiencies. Hypothyroidism: Was started on initial IV Synthroid. Plans to de-escalate to p.o. when free T4 improved. dilated cardiomyopathy due to hypertensive heart disease: Prior EF 30% in 2012. Repeat echocardiogram pending. Hypertension Diabetes mellitus type 2: Uncontrolled with hemoglobin A1c is 8.6 Mental retardation with noncompliance of medication and medical regimen. Plan: Wound care consulted for assistance in management of multiple skin lesions. Check phosphorus, magnesium, vitamin C, vitamin D, vitamin C, thiamine levels Continue aspirin, statin, Synthroid Daily CBC CMP, free T4 Continue with erythromycin drops to the eyes bilaterally Continue work with PT and OT-urged patient to participate. Will place Marlow catheter given the amount of skin breakdown to her groin Awaiting MRI MRA of the brain and neck- unable to do because of Tox screen is pending Follow-up echocardiogram 
Continue with IV fluids, nutritional support as outlined by registered dietitian. Ativan as needed for anxiety Continue Rocephin for now Discussed care at length with  Cardiology: Start with loading dose IV amiodarone followed by amiodarone drip, continue with heparin infusion as well as aspirin. Plan is potentially for a long-term DOAC if able to tolerate. Patient is full code Case management has contacted Adult Protective Services Current Antibtiocs:  
Rocephin Lines:  
Peripheral 
 
Case discussed with:  [x]Patient  [x]Family  [x]Nursing  [x]Case Management DVT Prophylaxis:  []Lovenox  []Hep SQ  []SCDs  []Coumadin   [x]On Heparin gtt I have independently examined the patient and reviewed all lab studies and imgaing as well as review of nursing notes and physican notes from the past 24 hours. Kamala Fabian D.O. Pager [phone] No Known Allergies Subjective:  
  
Pt seen and examined. Patient is awake and alert. She is very anxious. Easily responds to questions. Very fearful of our interactions with her. Objective:  
 
  
Visit Vitals /64 (BP 1 Location: Left arm, BP Patient Position: Supine) Pulse 64 Temp 97.6 °F (36.4 °C) Resp 19 Ht 5' (1.524 m) Wt 49.4 kg (109 lb) SpO2 96% BMI 21.29 kg/m² Temp (24hrs), Av.7 °F (36.5 °C), Min:97.5 °F (36.4 °C), Max:98.2 °F (36.8 °C) General:   awake alert and oriented to person and place. She is disheveled and poorly groomed Skin:   Multiple skin lesions noted and as documented per wound care. Most notable is bilateral lower extremity erythema with patches of eschar and excoriating skin. Periorbital skin is red with areas of excoriation. HEENT:  No scleral icterus or pallor; oral mucosa moist, lips moist  
Lymph Nodes:   not assessed today Lungs:   non, labored; bilaterally clear to aspiration- no crackles wheezes rales or rhonchi Heart:   Tachycardic and irregular, s1 and s2; no murmurs rubs or gallops; 2+ edema, + pedal pulses Abdomen:  soft, non-distended, active bowel sounds, non-tender Genitourinary:  Labia and perineum swollen red tender to touch. There is no crepitus noted. there is some skin sloughing. Extremities:   Decreased muscle mass; no contractures, no joint effusions Neurologic:  No gross focal motor or sensory abnormalities; CN 2-12 intact; Follows commands. General weakness Psychiatric:   Anxious and fearful Labs: Results:  
Chemistry Recent Labs  
  07/08/20 
0420 07/07/20 2350 * 123* * 144  
K 5.9* 5.0  
* 112* CO2 26 26 BUN 80* 78* CREA 1.88* 1.94* CA 9.3 9.4 AGAP 5 6 BUCR 43* 40* AP  --  90  
TP  --  6.2* ALB  --  2.5*  
GLOB  --  3.7 AGRAT  --  0.7* CBC w/Diff Recent Labs  
  07/07/20 2350 WBC 9.4  
RBC 5.60* HGB 18.8* HCT 53.1*  
 GRANS 82* LYMPH 10* EOS 0 No results found for: SDES Lab Results Component Value Date/Time Culture result: NO GROWTH AFTER 6 HOURS 07/08/2020 12:05 AM  
 Culture result: NO GROWTH AFTER 6 HOURS 07/07/2020 11:50 PM  
  
 
Results Procedure Component Value Units Date/Time WET PREP [[id number]] Collected:  07/08/20 0510 Order Status:  Completed Specimen:  Vagina Updated:  07/08/20 5827 Special Requests: NO SPECIAL REQUESTS Wet prep NO YEAST,TRICHOMONAS OR CLUE CELLS NOTED CULTURE, BLOOD [[id number]] Collected:  07/08/20 0005 Order Status:  Completed Specimen:  Blood Updated:  07/08/20 7082 Special Requests: NO SPECIAL REQUESTS Culture result: NO GROWTH AFTER 6 HOURS     
 CULTURE, BLOOD [[id number]] Collected:  07/07/20 2350 Order Status:  Completed Specimen:  Blood Updated:  07/08/20 4199 Special Requests: NO SPECIAL REQUESTS Culture result: NO GROWTH AFTER 6 HOURS Imaging:  
 
Ct Head Wo Cont Result Date: 7/8/2020 IMPRESSION: Probable subacute infarct right posterior frontal region.   All CT scans at this facility are performed using dose optimization technique as appropriate to the performed exam, to include automated exposure control, adjustment of the mA and/or kV according to patient's size (Including appropriate matching for site-specific examinations), or use of iterative reconstruction technique. Ct Pelv Wo Cont Result Date: 7/8/2020 IMPRESSION: Additional deformity of the hips. No acute osseous abnormality evident. Diffuse soft tissue edema is present. No soft tissue gas is seen. Diffuse gaseous distention of bowel. No pelvic mass is apparent. Nonobstructive lower pole right renal calculus. There is lobular projection of density from the anus-correlate with direct inspection. Xr Chest [facility] Result Date: 7/8/2020 IMPRESSION: Stable cardiomegaly. Incomplete penetration to the left heart with inability to evaluate for underlying infiltrate, atelectasis, or small pleural effusion.

## 2020-07-08 NOTE — ROUTINE PROCESS
Patient requesting waffles, pancakes and orange juice. Stated please don't forget. I informed patient, she has a few tests this morning and when she returns to the room we will ask physician about a diet order.

## 2020-07-08 NOTE — PROGRESS NOTES
MRI Safety Screening form needs to be filled out and FAXED to [id number] MRI can be scheduled. If unable to acquire information from patient  [facility] must be contacted, or screening xrays will need to be ordered.    
IF pt is Claustrophobic or will need Pain Meds please have these ordered in advance to help facilitate MRI exam.

## 2020-07-09 NOTE — CDMP QUERY
Pt admitted with cerebral infarct, Encephalopathy documented. Please further specify type of Encephalopathy in the medical record ? Metabolic Encephalopathy 
? Encephalopathy ruled out after study ? Other Encephalopathy 
? Other, please specify ? Clinically unable to determine The medical record reflects the following: 
   Risk Factors: dilated cardiomyopathy, paroxysmal atrial fibrillation with RVR, subacute CVA Clinical Indicators: \"Encephalopathy, question of whether this is multifactorial, with the CT finding of a probable but not definite right frontal infarct not convincingly explanatory of the whole clinical picture, who has had a uremia, slightly increased LFTs, potentially a yeast UTI, and uncontrolled diabetes as potentially contributing factors, as well as use of lorazepam to address agitation. \"  And \"Sleepy but arousable, presumably sedated,\" And 
\"unspecified intellectual deficit found on the floor by sister, there are gaps in the history. \"   Per Dr. Deepthi Shaikh, progress note, 7/9/2020. ALT/AST:  52/49 on 7/7/2020 Glucose POC:  96 - 015 Treatment: \"CT of head Carotid ultrasound Monitor for complications of sepsis as well as other medical derangement that may develop during her stay\" per Dr. Deepthi Shaikh, progress note, 7/9/2020  
lorazzepam 1mg IV given 7/8/2020 8498 Thank you, MARIUSZ Valladares RN, PATTIE [email or web address] 
Cell # for Tamia Bob RN, CDS supervisor:   [phone]

## 2020-07-09 NOTE — PROGRESS NOTES
Admit Date: 7/7/2020 Date of Service: 7/9/2020 Reason for follow-up: Syncope Assessment: CVA with a history of TIA -admitted because she was found on the floor by family. Evaluated by neurology. On aspirin Lipitor beta-blocker heparin drip. Suspect subacute infarct of the right posterior frontal region Paroxysmal atrial fibrillation: Previously on amiodarone and Coreg but was noncompliant with medications Indeterminate troponins: Audiology following, EKG with nonspecific ST changes. electrolyte abnormalities: Hyponatremia, hyperkalemia VALENTINA: Slightly improved with IV fluid hydration Multiple skin lesions and areas of pressure injury, all POA: Please refer to wound care consult note for detailed description of location of each of the 10+ wounds. S several of the skin lesions are possibly indicative of vitamin deficiencies. Hypothyroidism: Was started on initial IV Synthroid. Plans to de-escalate to p.o. when free T4 improved. dilated cardiomyopathy due to hypertensive heart disease: Prior EF 30% in 2012. Repeat echocardiogram pending. Hypertension:  controlled Diabetes mellitus type 2: Uncontrolled with hemoglobin A1c is 8.6 Mental retardation with noncompliance of medication and medical regimen. Metabolic encephalopathy Plan: Wound care consulted for assistance in management of multiple skin lesions. Check phosphorus, magnesium, vitamin C,  vitamin C, thiamine levels Continue aspirin, statin, Synthroid (transition to po from IV) Discussed with Cardiology: On heparin drip; Amiodarone drip; d/c ASA while on heparin drip. Continue Coreg; hold lisinopril for now. Daily CBC CMP, free T4 Add VIt D 5000 units, Vit C Continue with erythromycin drops to the eyes bilaterally Continue work with PT and OT-urged patient to participate. F/u blood cx from 7/7 Awaiting MRI MRA of the brain and neck- discussed with MRI tech this am 
 
 Continue with IV fluids, nutritional support as outlined by registered dietitian. Ativan as needed for anxiety Continue Rocephin for now Patient is full code Case management has contacted Adult Protective Services on 2020 Current Antibtiocs:  
Rocephin Lines:  
Peripheral 
 
Case discussed with:  [x]Patient  [x]Family  [x]Nursing  [x]Case Management DVT Prophylaxis:  []Lovenox  []Hep SQ  []SCDs  []Coumadin   [x]On Heparin gtt I have independently examined the patient and reviewed all lab studies and imgaing as well as review of nursing notes and physican notes from the past 24 hours. Mohinder Gibbs D.O. Pager [phone] No Known Allergies Subjective:  
  
Pt seen and examined. Patient is awake and alert. She is very anxious. Easily responds to questions. Very fearful of our interactions with her. Still unable to place dobson cath yesterday. Objective:  
 
  
Visit Vitals /59 (BP 1 Location: Left arm, BP Patient Position: At rest) Pulse 89 Temp 96.8 °F (36 °C) Resp 19 Ht 5' (1.524 m) Wt 49.4 kg (108 lb 14.5 oz) SpO2 91% BMI 21.27 kg/m² Temp (24hrs), Av.5 °F (36.4 °C), Min:96.8 °F (36 °C), Max:97.7 °F (36.5 °C) General:   awake alert and oriented to person and place. She is disheveled and poorly groomed Skin:   Multiple skin lesions noted and as documented per wound care. Most notable is bilateral lower extremity erythema with patches of eschar and excoriating skin. Periorbital skin is red with areas of excoriation. HEENT:  No scleral icterus or pallor; oral mucosa moist, lips moist  
Lymph Nodes:   not assessed today Lungs:   non, labored; bilaterally clear to aspiration- no crackles wheezes rales or rhonchi Heart:   Tachycardic and irregular, s1 and s2; no murmurs rubs or gallops; 2+ edema, + pedal pulses Abdomen:  soft, non-distended, active bowel sounds, non-tender Genitourinary:  Labia and perineum swollen red tender to touch. There is no crepitus noted. there is some skin sloughing. Extremities:   Decreased muscle mass; no contractures, no joint effusions Neurologic:  No gross focal motor or sensory abnormalities; CN 2-12 intact; Follows commands. General weakness Psychiatric:   Anxious and fearful Labs: Results:  
Chemistry Recent Labs  
  07/08/20 
0420 07/07/20 
2350 * 123* * 144  
K 5.9* 5.0  
* 112* CO2 26 26 BUN 80* 78* CREA 1.88* 1.94* CA 9.3 9.4 AGAP 5 6 BUCR 43* 40* AP  --  90  
TP  --  6.2* ALB  --  2.5*  
GLOB  --  3.7 AGRAT  --  0.7* CBC w/Diff Recent Labs  
  07/09/20 
0106 07/07/20 2350 WBC 9.3 9.4  
RBC 4.75 5.60* HGB 15.0 18.8* HCT 45.4* 53.1*  
 154 GRANS 71 82* LYMPH 16* 10* EOS 1 0 No results found for: SDES Lab Results Component Value Date/Time Culture result: NO GROWTH 1 DAY 07/08/2020 12:05 AM  
 Culture result: NO GROWTH 1 DAY 07/07/2020 11:50 PM  
  
 
Results Procedure Component Value Units Date/Time WET PREP [[id number]] Collected:  07/08/20 0510 Order Status:  Completed Specimen:  Vagina Updated:  07/08/20 8635 Special Requests: NO SPECIAL REQUESTS Wet prep NO YEAST,TRICHOMONAS OR CLUE CELLS NOTED CULTURE, BLOOD [[id number]] Collected:  07/08/20 0005 Order Status:  Completed Specimen:  Blood Updated:  07/09/20 8436 Special Requests: NO SPECIAL REQUESTS Culture result: NO GROWTH 1 DAY     
 CULTURE, BLOOD [[id number]] Collected:  07/07/20 2350 Order Status:  Completed Specimen:  Blood Updated:  07/09/20 1380 Special Requests: NO SPECIAL REQUESTS Culture result: NO GROWTH 1 DAY Imaging:  
 
Ct Head Wo Cont Result Date: 7/8/2020 IMPRESSION: Probable subacute infarct right posterior frontal region.   All CT scans at this facility are performed using dose optimization technique as appropriate to the performed exam, to include automated exposure control, adjustment of the mA and/or kV according to patient's size (Including appropriate matching for site-specific examinations), or use of iterative reconstruction technique. Ct Pelv Wo Cont Result Date: 7/8/2020 IMPRESSION: Additional deformity of the hips. No acute osseous abnormality evident. Diffuse soft tissue edema is present. No soft tissue gas is seen. Diffuse gaseous distention of bowel. No pelvic mass is apparent. Nonobstructive lower pole right renal calculus. There is lobular projection of density from the anus-correlate with direct inspection. Xr Chest [facility] Result Date: 7/8/2020 IMPRESSION: Stable cardiomegaly. Incomplete penetration to the left heart with inability to evaluate for underlying infiltrate, atelectasis, or small pleural effusion.

## 2020-07-09 NOTE — PROGRESS NOTES
Problem: Mobility Impaired (Adult and Pediatric) Goal: *Acute Goals and Plan of Care (Insert Text) Description: Physical Therapy Goals Initiated 7/8/2020 and to be accomplished within 7 day(s) 3 DAY TRIAL 1. Patient will roll side to side in bed with moderate assistance . 2.  Patient will move from supine to sit and sit to supine  in bed with maximal assistance. 3.  Patient will perform sitting edge of bed for 5-10 minutes with fair balance. 4.  Patient will transfer from bed to chair and chair to bed with maximal assistance using the least restrictive device. 5.  Patient will perform sit to stand with maximal assistance. 6.  Patient participate in gait and stair training when appropriate. Prior Level of Function:  
Pt does not seem to be the best historian and needed to be asked PLOF questions a couple of times for answers. Patient was independence for all mobility including gait using a cane. Patient lives alone in a single story home 1 step to enter. Pt states she has a walker at home if needed. Outcome: Not Progressing Towards Goal 
  
PHYSICAL THERAPY TREATMENT Patient: Evelyne Robbins (2 y.o. female) Date: 7/9/2020 Diagnosis: A-fib ([facility] [I48.91] CVA (cerebral vascular accident) ([facility] [I63.9] VALENTINA (acute kidney injury) ([facility] [N17.9] <principal problem not specified> Precautions: Fall, Skin ASSESSMENT: 
Patient received lying on right side in fetal position. She is oriented to self and place, sates \"hospital\". Patient with poor command following and decreased attention to task. She requires max A to attempt to straighten BLE's, but maintain B knees flexed to 90 deg. Pt winces in pain with gentle touch of BLE's, observed multiple wounds and redness. Patient requires max cues and total assistance to 1/2 roll to the right, she screams in pain. At end of session, patient left with call bell within reach. Patient seen day 1/3 of 3 day trial.  
Progression toward goals: []      Improving appropriately and progressing toward goals 
[]      Improving slowly and progressing toward goals [x]      Not making progress toward goals PLAN: 
Patient continues to benefit from skilled intervention to address the above impairments. Continue treatment per established plan of care. Discharge Recommendations:  Skilled Nursing Facility/LTC Further Equipment Recommendations for Discharge:  hospital bed SUBJECTIVE:  
Patient stated \" What's your name? , Wilburt Sleet? Justo Plummer OBJECTIVE DATA SUMMARY:  
Critical Behavior: 
Neurologic State: Alert, Confused Orientation Level: Oriented to person Cognition: Decreased command following Safety/Judgement: Lack of insight into deficits, Fall prevention Functional Mobility Training: 
Bed Mobility: 
Rolling: Total assistance Pain: 
Pain level pre-treatment: -/10 pt winces with gentle touch to BLE's 
Pain level post-treatment: -/10 Pain Intervention(s): Medication (see MAR); Rest, Ice, Repositioning Response to intervention: Nurse notified, See doc flow Activity Tolerance:  
Limited Please refer to the flowsheet for vital signs taken during this treatment. After treatment:  
[] Patient left in no apparent distress sitting up in chair 
[x] Patient left in no apparent distress in bed 
[x] Call bell left within reach 
[] Nursing notified 
[] Caregiver present 
[] Bed alarm activated 
[] SCDs applied COMMUNICATION/EDUCATION:  
[x]         Role of Physical Therapy in the acute care setting. []         Fall prevention education was provided and the patient/caregiver indicated understanding. [x]         Patient/family have participated as able in working toward goals and plan of care. []         Patient/family agree to work toward stated goals and plan of care. []         Patient understands intent and goals of therapy, but is neutral about his/her participation.  
[]         Patient is unable to participate in stated goals/plan of care: ongoing with therapy staff. 
[]         Other: 
 
   
Barby Pace, PT Time Calculation: 8 mins

## 2020-07-09 NOTE — ROUTINE PROCESS
Bedside and Verbal shift change report given to JOSIAH Bradley RN (oncoming nurse) by KAMLESH Quinn RN (offgoing nurse). Report included the following information SBAR.

## 2020-07-09 NOTE — CDMP QUERY
Pt admitted with cerebral .  Pt noted to have elevated CKMB. If possible, please document in progress notes and discharge summary if you are evaluating and/or treating any of the following: ? Traumatic rhabdomyolysis ? Nontraumatic rhabdomyolysis ? Other, please specify ? Clinically unable to determine The medical record reflects the following: 
 
   Risk Factors: dilated cardiomyopathy, A. Fib not on OA, HTN Clinical Indicators: \"According to patient, she fell the other night and she could not get up. She does not know how long she has been on the floor. Her sister found her on the floor to be alert and awake. \"  Per Dr. Angela Jon, H&P, 7/8/2020. CK:  566 on 7/7/2020 2350 CKMB:  17.9 on 7/8/2020 0420 Troponin:  0.15 on 7/7/2020 2350 Treatment: gentl IV fluid, avoid nepthrotoxic medication Per [email or web address] Traumatic rhabdomyolysis cause examples: crush syndrome, prolonged immobilization Nontraumatic rhabdomyolysis cause examples:  marked exertion, hyperthermia, metabolic myopathy, drugs or toxins, infections, electrolyte disorders Thank you, MARIUSZ Valladares RN, CDS [email or web address] 
Cell # for Harwood Closs, RN, CDS:  [phone]

## 2020-07-09 NOTE — PROGRESS NOTES
NUTRITION Nursing Referral: Pressure Injury Nutrition Consult: General Nutrition Management & Supplements RECOMMENDATIONS / PLAN:  
 
- Update food preferences and supplement flavor preference in orders - Monitor/ encourage po intake. Assistance with meals as needed - Continue RD inpatient monitoring and evaluation. NUTRITION INTERVENTIONS & DIAGNOSIS:  
 
- Meals/snacks: modified composition - Medical food supplement therapy: continue - Collaboration and referral of nutrition care: interdisciplinary rounds Nutrition Diagnosis: Increased nutrient needs (protein) related to promotion of wound healing as evidenced by pt with pressure injury wound. Unintended weight loss related to predicated prolonged inadequate energy intake as evidenced by wt loss of 12.1% x 3 year and 9 month PTA per chart hx ASSESSMENT:  
 
7/9: Pt reported good appetite; has good meal intake per RN. Tolerating diet. Food preferences provided by MD. Francois Hardy with patient's sister; stated pt usually eats 2-3 meals daily + drink nutrition shake sometimes. Was tolerating regular consistency food at home PTA. Discussed current diet consistency due to sister asking about providing food for pt. Limited NFPE conducted due to pt contracted; moderate fat loss in triceps; moderate muscle loss in temples and clavicles 7/8: Noted pt with intellectual deficit, poor historian per H&P; unable to physically see pt today, pt unavailable at times of visit. Admitted for CVA; has impaired renal function. Has skin breakdown. Wt loss in past several years per chart hx. On dysphagia diet per SLP. Noted plan per MD to check vitamins C and D, phos, Mg, and thiamine levels. Nutritional intake adequate to meet patients estimated nutritional needs:  No 
 
Diet: DIET DYSPHAGIA PUREED (NDD1) DIET NUTRITIONAL SUPPLEMENTS Breakfast, Dinner; Glucerna Shake Food Allergies:  None known Current Appetite: Fair Appetite/meal intake prior to admission: good per sister report. Pt usually eats 2-3 meals daily, sometimes consuming nutrition shake. On Monday, Wednesdays and Fridays, pt received lunch and dinner from [employer] Feeding Limitations:  [x] Swallowing difficulty: SLP following     [] Chewing difficulty    [] Other: 
Current Meal Intake:  
Patient Vitals for the past 100 hrs: 
 % Diet Eaten 07/09/20 1003 25 % 07/08/20 1930 0 % BM: unknown Skin Integrity: unstageable sacral pressure injury; moisture associated skin damage on right & left lower legs;  Callous/corns on right and left elbows;  Edematous on perineum anterior labia; Moisture associated skin damage on right and left eyes; Right and left hip wounds/ blanchable redness Edema:   [] No     [x] Yes Pertinent Medications: Reviewed: ascorbic acid, atorvastatin, cholecalciferol, erythromycin, famotidine, SSI, levothyroxine, ondansetron, pericolace Recent Labs  
  07/08/20 
1634 07/08/20 
0420 07/07/20 
2350 NA  --  146* 144 K  --  5.9* 5.0  
CL  --  115* 112* CO2  --  26 26 GLU  --  122* 123* BUN  --  80* 78* CREA  --  1.88* 1.94* CA  --  9.3 9.4 MG 2.0  --   --   
PHOS 3.5  --   --   
ALB  --   --  2.5* ALT  --   --  52 Intake/Output Summary (Last 24 hours) at 7/9/2020 1238 Last data filed at 7/9/2020 1003 Gross per 24 hour Intake 120 ml Output 0 ml Net 120 ml Anthropometrics: 
Ht Readings from Last 1 Encounters:  
07/08/20 5' (1.524 m) Last 3 Recorded Weights in this Encounter 07/07/20 2359 07/08/20 0615 07/08/20 1930 Weight: 50.3 kg (111 lb) 49.4 kg (109 lb) 49.4 kg (108 lb 14.5 oz) Body mass index is 21.27 kg/m². Weight History:  -15 lb, 12.1% x 3 year and 9 month PTA per chart hx. Per sister report, pt lost weight about 1- 1.5 years ago, then wt has remained stable since then Weight Metrics 7/8/2020 9/28/2016 9/6/2016 6/2/2016 4/18/2016 1/19/2016 1/19/2016 Weight 108 lb 14.5 oz 124 lb 121 lb 8 oz 128 lb 128 lb 3.2 oz 123 lb 9.6 oz 123 lb 9.6 oz  
BMI 21.27 kg/m2 20.63 kg/m2 20.22 kg/m2 21.3 kg/m2 21.33 kg/m2 20.57 kg/m2 20.57 kg/m2 Admitting [facility]: [facility] [I48.91] CVA (cerebral vascular accident) ([facility] [I63.9] VALENTINA (acute kidney injury) ([facility] [N17.9] Pertinent PMHx: CKD, CHF, DM, HTN, hypothyroidism, iron deficiency anemia Education Needs:        [x] None identified  [] Identified - Not appropriate at this time  []  Identified and addressed - refer to education log Learning Limitations:   [] None identified  [x] Identified: intellectual deficit, conjunctivitis of both eyes Cultural, Moravian & ethnic food preferences:  [x] None identified    [] Identified and addressed ESTIMATED NUTRITION NEEDS:  
 
Calories: 6304-0211 kcal (30-35 kcal/kg) based on  [x] Actual BW: 49 kg      [] IBW Protein: 62-74 gm (1.25-1.5 gm/kg) based on  [x] Actual BW      [] IBW Fluid: 1 mL/kcal 
  
MONITORING & EVALUATION:  
 
Nutrition Goal(s):  
- PO nutrition intake will meet >75% of patient estimated nutritional needs within the next 7 days. Outcome: Progressing towards goal  
 
Monitoring:   [x] Food and nutrient intake   [x] Food and nutrient administration  [x] Comparative standards   [x] Nutrition-focused physical findings   [x] Anthropometric Measurements   [x] Treatment/therapy   [x] Biochemical data, medical tests, and procedures Previous Recommendations (for follow-up assessments only): Implemented Discharge Planning: No nutritional discharge needs at this time. Participated in care planning, discharge planning, & interdisciplinary rounds as appropriate. Flora Case RD Pager: [phone]

## 2020-07-09 NOTE — PROGRESS NOTES
7/9/2020 10:47 AM 
 
SSN: [id number] Subjective:   79-year-old female with history of hypertension, dilated cardiomyopathy, paroxysmal atrial fibrillation, hypothyroidism, and unspecified intellectual deficit found on the floor by sister, there are gaps in the history. [place] is referred to my initial note. CT of the head had shown a probable subacute infarct in the right posterior frontal region. MRI of the brain with MRA of the head and neck are currently still pending. She has been nervous, agitated, has received lorazepam.  Overnight there were reports of previous of ventricular tachycardia with heart rate from 125-188 yesterday afternoon. Cardiology is following the patient. They point out the fact that she has \"history of significant remote GI bleed\", as well as make reference to the fact that she has a dilated cardiomyopathy. Social History Socioeconomic History  Marital status: SINGLE Spouse name: Not on file  Number of children: Not on file  Years of education: Not on file  Highest education level: Not on file Occupational History  Not on file Social Needs  Financial resource strain: Not on file  Food insecurity Worry: Not on file Inability: Not on file  Transportation needs Medical: Not on file Non-medical: Not on file Tobacco Use  Smoking status: Never Smoker  Smokeless tobacco: Never Used Substance and Sexual Activity  Alcohol use: No  
 Drug use: No  
 Sexual activity: Never Birth control/protection: None Lifestyle  Physical activity Days per week: Not on file Minutes per session: Not on file  Stress: Not on file Relationships  Social connections Talks on phone: Not on file Gets together: Not on file Attends Zoroastrian service: Not on file Active member of club or organization: Not on file Attends meetings of clubs or organizations: Not on file Relationship status: Not on file  Intimate partner violence Fear of current or ex partner: Not on file Emotionally abused: Not on file Physically abused: Not on file Forced sexual activity: Not on file Other Topics Concern  Not on file Social History Narrative  Not on file History reviewed. No pertinent family history. Current Facility-Administered Medications Medication Dose Route Frequency Provider Last Rate Last Dose  cholecalciferol (VITAMIN D3) capsule 5,000 Units  5,000 Units Oral DAILY Dalia Yousif MD      
 ascorbic acid (vitamin C) (VITAMIN C) tablet 1,000 mg  1,000 mg Oral BID Dalia Yousif MD      
 levothyroxine (SYNTHROID) tablet 100 mcg  100 mcg Oral Kwabena Zayas, Jerry Rodriguez MD      
 heparin 25,000 units in D5W 250 ml infusion  18-36 Units/kg/hr IntraVENous TITRATE Colin Eaton MD 7.5 mL/hr at 07/09/20 0732 15 Units/kg/hr at 07/09/20 0732  ondansetron (ZOFRAN) injection 4 mg  4 mg IntraVENous Q6H PRN Colin Eaton MD      
 atorvastatin (LIPITOR) tablet 80 mg  80 mg Oral QHS Colin Eaton MD   80 mg at 07/08/20 2227  acetaminophen (TYLENOL) tablet 650 mg  650 mg Oral Q4H PRN Colin Eaton MD      
 labetaloL (NORMODYNE;TRANDATE) 20 mg/4 mL (5 mg/mL) injection 5 mg  5 mg IntraVENous Q10MIN PRN Colin Eaton MD      
 senna-docusate (PERICOLACE) 8.6-50 mg per tablet 2 Tab  2 Tab Oral QHS Colin Eaton MD   2 Tab at 07/08/20 2227  cefTRIAXone (ROCEPHIN) 1 g in sterile water (preservative free) 10 mL IV syringe  1 g IntraVENous Q24H Colin Eaton MD   1 g at 07/09/20 2068  insulin lispro (HUMALOG) injection   SubCUTAneous Louann Post MD   Stopped at 07/09/20 0730  
 glucose chewable tablet 16 g  4 Tab Oral PRN Colin Eaton MD      
 glucagon [facility]) injection 1 mg  1 mg IntraMUSCular PRN Colin Eaton MD      
 dextrose (D50W) injection syrg 12.5-25 g  25-50 mL IntraVENous PRN Colin Eaton MD      
  erythromycin (ILOTYCIN) 5 mg/gram (0.5 %) ophthalmic ointment   Both Eyes Q4H Matthew Jimenez MD      
 famotidine (PEPCID) tablet 20 mg  20 mg Oral DAILY Eliana Scott MD   20 mg at 07/09/20 1019  carvediloL (COREG) tablet 12.5 mg  12.5 mg Oral BID WITH MEALS Eliana Scott MD   12.5 mg at 07/09/20 1018  amiodarone (NEXTERONE) 360 mg in dextrose 200 mL (1.8 mg/mL) infusion  0.5-1 mg/min IntraVENous CONTINUOUS Bailey Lamas PA 16.7 mL/hr at 07/08/20 2329 0.5 mg/min at 07/08/20 2329  
 vits A and D-white pet-lanolin (A&D) ointment   Topical BID Eliana Scott MD      
 LORazepam (ATIVAN) injection 1 mg  1 mg IntraVENous Q2H PRN Marta Edouard MD   1 mg at 07/08/20 1549  
 sodium chloride (NS) flush 5-10 mL  5-10 mL IntraVENous PRN Eliana Scott MD      
 
 
Past Medical History:  
Diagnosis Date  Cardiac echocardiogram 02/09/2015 EF 64%. No WMA. Mild LVH. Gr 2 DDfx. RVSP 30 mmHg. Mod-severe LAE. No significant valvular heart disease.  Cardiac nuclear imaging test, abnormal 02/09/2015 LV apical defect likely c/w prior infarction w/sm area of distal anterior romulo-infarct ischemia. EF 57%. Apical WMA, likely from prior infarct. Nondiagnostic EKG on pharm stress test.  
 Chronic kidney disease, stage III (moderate) ([address] 2/6/2012  Chronic renal disease, stage III ([address]  Congestive heart failure ([address] 2/6/2012  Congestive heart failure, unspecified 01/2012 Echocardiogram with severe global hypokinesis and EF 30% consistent with dilated cardiomyopathy.  DDD (degenerative disc disease), lumbar 9/10/2016  Diabetes mellitus ([address] 01/2015 Presented with blood sugar of 637 and HbA1c 13.4  Diabetes mellitus with kidney complication ([address] 3/6/0818  Dilated cardiomyopathy ([address] 2/6/2012  Hypertension  Hypothyroidism  Iron deficiency anemia 01/2015 Presented with Hb 5.1/ Hct 18.0. On Xarelto.  No obvious source of bleeding. Did not follow-up for outpatient GI evaluation.  Low back pain 01/21/2015 Abdominal CT scan with thoracolumbar rotoscoliosis with severe multilevel degenerative disc disease and facet arthropathy; central canal stenosis at L4-L5.  Mental retardation 2/6/2012  Paroxysmal atrial fibrillation ([facility] 01/2012 Presented with atrial fibrillation with RVR; converted spontaneously to sinus rhythm during hospitalization and subsequently started on amiodarone and Xarelto. History reviewed. No pertinent surgical history. No Known Allergies Vital signs:   
Visit Vitals /59 (BP 1 Location: Left arm, BP Patient Position: At rest) Pulse 89 Temp 96.8 °F (36 °C) Resp 19 Ht 5' (1.524 m) Wt 49.4 kg (108 lb 14.5 oz) SpO2 91% BMI 21.27 kg/m² Review of Systems:  
Not obtainable EXAM: Sleepy but arousable, presumably sedated, able to tell me her name, able to tell me after I repeated that she is at [facility], but unable to tell me the date. She cannot sustain a conversation and she cannot follow additional simple commands. . Heart is irregular. There is no gaze preference, EOM's could not be fully evaluated, PERRL, no facial asymmetries. Strength and tone appear symmetric on limited examination. There are no tremors seen. Sensory exam could not be performed. . DTR's +2, gait could not be performed. Recent Results (from the past 24 hour(s)) GLUCOSE, POC Collection Time: 07/08/20 11:32 AM  
Result Value Ref Range Glucose (POC) 96 70 - 110 mg/dL LACTIC ACID Collection Time: 07/08/20 12:52 PM  
Result Value Ref Range Lactic acid 1.8 0.4 - 2.0 MMOL/L  
PTT Collection Time: 07/08/20 12:52 PM  
Result Value Ref Range aPTT 170.7 (HH) 23.0 - 36.4 SEC  
VITAMIN B12 & FOLATE Collection Time: 07/08/20 12:52 PM  
Result Value Ref Range Vitamin B12 >2,000 (H) 211 - 911 pg/mL Folate >20.0 (H) 3.10 - 17.50 ng/mL GLUCOSE, POC  
 Collection Time: 07/08/20  4:12 PM  
Result Value Ref Range Glucose (POC) 226 (H) 70 - 110 mg/dL IRON PROFILE Collection Time: 07/08/20  4:34 PM  
Result Value Ref Range Iron 28 (L) 50 - 175 ug/dL TIBC 223 (L) 250 - 450 ug/dL Iron % saturation 13 (L) 20 - 50 % VITAMIN D, 25 HYDROXY Collection Time: 07/08/20  4:34 PM  
Result Value Ref Range Vitamin D 25-Hydroxy 22.7 (L) 30 - 100 ng/mL PHOSPHORUS Collection Time: 07/08/20  4:34 PM  
Result Value Ref Range Phosphorus 3.5 2.5 - 4.9 MG/DL MAGNESIUM Collection Time: 07/08/20  4:34 PM  
Result Value Ref Range Magnesium 2.0 1.6 - 2.6 mg/dL GLUCOSE, POC Collection Time: 07/08/20  9:01 PM  
Result Value Ref Range Glucose (POC) 106 70 - 110 mg/dL PTT Collection Time: 07/08/20  9:26 PM  
Result Value Ref Range aPTT 76.7 (H) 23.0 - 36.4 SEC  
CBC WITH AUTOMATED DIFF Collection Time: 07/09/20  1:06 AM  
Result Value Ref Range WBC 9.3 4.6 - 13.2 K/uL  
 RBC 4.75 4.20 - 5.30 M/uL  
 HGB 15.0 12.0 - 16.0 g/dL HCT 45.4 (H) 35.0 - 45.0 % MCV 95.6 74.0 - 97.0 FL  
 MCH 31.6 24.0 - 34.0 PG  
 MCHC 33.0 31.0 - 37.0 g/dL  
 RDW 14.3 11.6 - 14.5 % PLATELET 212 221 - 969 K/uL MPV 12.3 (H) 9.2 - 11.8 FL  
 NEUTROPHILS 71 40 - 73 % LYMPHOCYTES 16 (L) 21 - 52 % MONOCYTES 12 (H) 3 - 10 % EOSINOPHILS 1 0 - 5 % BASOPHILS 0 0 - 2 %  
 ABS. NEUTROPHILS 6.7 1.8 - 8.0 K/UL  
 ABS. LYMPHOCYTES 1.5 0.9 - 3.6 K/UL  
 ABS. MONOCYTES 1.1 0.05 - 1.2 K/UL  
 ABS. EOSINOPHILS 0.1 0.0 - 0.4 K/UL  
 ABS. BASOPHILS 0.0 0.0 - 0.1 K/UL  
 DF AUTOMATED T4, FREE Collection Time: 07/09/20  1:06 AM  
Result Value Ref Range T4, Free 0.7 0.7 - 1.5 NG/DL  
GLUCOSE, POC Collection Time: 07/09/20  6:31 AM  
Result Value Ref Range Glucose (POC) 102 70 - 110 mg/dL Last LFTs showed a slightly elevated AST at 49.  
 
Assessment/Plan: Encephalopathy, question of whether this is multifactorial, with the CT finding of a probable but not definite right frontal infarct not convincingly explanatory of the whole clinical picture, who has had a uremia, slightly increased LFTs, potentially a yeast UTI, and uncontrolled diabetes as potentially contributing factors, as well as use of lorazepam to address agitation. I think it will be very difficult for her to obtain her MRI and try to obtain some clarity as to whether she did have an infarct or not. Therefore I will go ahead and cancel in for now, order a CT of the head and carotid ultrasound to complete her neurological work-up. Recommend to continue to observe closely for complications of sepsis as well as other metabolic derangements that may develop during her stay. Will make further recommendations regarding the use of oral anticoagulation once the neuroimaging is repeated. Will follow. PLEASE NOTE:  
Portions of this document may have been produced using voice recognition software. Unrecognized errors in transcription may be present. This note will not be viewable in [address]

## 2020-07-09 NOTE — PROGRESS NOTES
[facility] - Progress Note Admit Date: 7/7/2020 Assessment:  
 
Hospital Problems  Date Reviewed: 9/28/2016 Codes Class Noted POA Paroxysmal atrial fibrillation with RVR (HCC) ICD-10-CM: I48.0 ICD-9-CM: 427.31  7/8/2020 CVA (cerebral vascular accident) [facility]) ICD-10-CM: I63.9 ICD-9-CM: 434.91  7/8/2020 Unknown VALENTINA (acute kidney injury) ([facility] ICD-10-CM: N17.9 ICD-9-CM: 584.9  7/8/2020 Unknown Fall ICD-10-CM: W19. [place] ICD-9-CM: Z760.2  7/8/2020 Unknown Conjunctivitis of both eyes ICD-10-CM: H10.9 ICD-9-CM: 372.30  7/8/2020 Unknown Hypothyroid ICD-10-CM: E03.9 ICD-9-CM: 244.9  7/8/2020 Unknown Cellulitis ICD-10-CM: L03.90 ICD-9-CM: 682.9  7/8/2020 Unknown Labia enlarged ICD-10-CM: N90.60 ICD-9-CM: 624.3  7/8/2020 Unknown Elevated troponin ICD-10-CM: R79.89 ICD-9-CM: 790.6  7/8/2020 Unknown  
   
  
 
 
-CVA, h/o TIAs. Found on floor by family. Probable subacute infarct right posterior frontal region by head CT.  
-Paroxysmal atrial fibrillation. Previously on amiodarone and coreg. Appears to be off all outpatient medications. Not anticoagulated given history of significant remote GIB. -Troponin indeterminate and not consistent with ACS. ECG with nonspecific ST changes, worsened in lateral leads in setting of tachycardia. -Dilated CMY presumed due to hypertensive heart disease with EF 30% 2012 improved to 64% 2015. Nuclear stress 2015 without significant ischemia. Ejection fraction in the 25% range on echocardiogram this admission with four-chamber dilatation. Patient appears to be compensated without volume overload. -H/o GIB while on Xarelto, significant bleed with Hgb of 4.6 2015, but unclear source of bleeding. 
-Hypertension. Labile this admission. 
-Diabetes mellitus. HgbA1c 8.6. 
-Acute on chronic kidney disease with hyperkalemia. -Hypothyroidism. Previously on synthroid. -Mental retardation, lives alone. -Noncompliance which appears to be due to lack of support. 
  
Remotely followed by Dr. Derrek Duron 2015. Plan:  
 
Remains in afib but HR much improved, reasonable to continue amiodarone infusion for today and if remains in afib, will plan to focus on HR control. Continued on coreg as BP appears to be tolerating, will continue. Continued on heparin infusion, would transition to [facility] when ok from neurology standpoint and no concern of bleeding. If unable to tolerate anticoagulation would need ASA in the least. 
Noted echo with EF 20-25%. Known history of dilated CMY presumed hypertensive heart disease. Would recommend conservative cardiac management in setting of comorbidities. Continue to follow renal function/electrolytes. No ace or arb given renal function and hyperkalemia. Volume status stable off diuretics. Subjective: Afib HR controlled Objective:  
  
Patient Vitals for the past 8 hrs: 
 Temp Pulse Resp BP SpO2  
07/09/20 0753 96.8 °F (36 °C) 89 19 105/59 91 % Patient Vitals for the past 96 hrs: 
 Weight 07/08/20 1930 49.4 kg (108 lb 14.5 oz) 07/08/20 0615 49.4 kg (109 lb) 07/07/20 2359 50.3 kg (111 lb) Intake/Output Summary (Last 24 hours) at 7/9/2020 1019 Last data filed at 7/9/2020 1003 Gross per 24 hour Intake 120 ml Output 0 ml Net 120 ml Physical Exam: 
General:  no distress Neck:  no JVD Lungs:  clear to auscultation bilaterally Heart:  irregularly irregular rhythm Abdomen:  abdomen is soft Extremities:  extremities normal 
 
Data Review:  
 
Labs: Results:  
   
Chemistry Recent Labs  
  07/08/20 
1634 07/08/20 
0420 07/07/20 
2350 GLU  --  122* 123* NA  --  146* 144 K  --  5.9* 5.0  
CL  --  115* 112* CO2  --  26 26 BUN  --  80* 78* CREA  --  1.88* 1.94* CA  --  9.3 9.4 MG 2.0  --   --   
PHOS 3.5  --   --   
AGAP  --  5 6 BUCR  --  43* 40* AP  --   --  90  
TP  --   --  6.2* ALB  --   --  2.5*  
GLOB  --   --  3.7 AGRAT  --   --  0.7* CBC w/Diff Recent Labs  
  07/09/20 
0106 07/07/20 
2350 WBC 9.3 9.4  
RBC 4.75 5.60* HGB 15.0 18.8* HCT 45.4* 53.1*  
 154 GRANS 71 82* LYMPH 16* 10* EOS 1 0 Cardiac Enzymes No results found for: CPK, CK, CKMMB, CKMB, RCK3, CKMBT, CKNDX, CKND1, PHILIP, TROPT, TROIQ, EM, TROPT, TNIPOC, BNP, BNPP Coagulation Recent Labs  
  07/08/20 
2126 07/08/20 
1252 APTT 76.7* 170.7* Lipid Panel Lab Results Component Value Date/Time Cholesterol, total 145 07/08/2020 04:20 AM  
 HDL Cholesterol 56 07/08/2020 04:20 AM  
 LDL, calculated 69 07/08/2020 04:20 AM  
 VLDL, calculated 20 07/08/2020 04:20 AM  
 Triglyceride 100 07/08/2020 04:20 AM  
 CHOL/HDL Ratio 2.6 07/08/2020 04:20 AM  
  
BNP No results found for: BNP, BNPP, XBNPT Liver Enzymes Recent Labs  
  07/07/20 
2350 TP 6.2* ALB 2.5* AP 90 Digoxin Thyroid Studies Lab Results Component Value Date/Time TSH 4.90 (H) 07/07/2020 11:50 PM  
    
 
Signed By: RONALDO Rodriguez   
 July 9, 2020

## 2020-07-09 NOTE — PROGRESS NOTES
1930: Assumed patient care from [facility] RN. Patient is alert and oriented to person,but disoriented to place, time and situation. Respiratory status Is stable on room air. Vital signs are stable. MEWS score is a one. Patient denies any pain, discomfort, nausea vomiting dizziness or anxiety. White board and fall card is updated. Bed is locked and in lowest position. Call bell, water and personal belongings are within reach. Patient has no questions, comments or concerns after bedside shift report. 0700: Patient had an uneventful shift. Respiratory status, vital signs and MEWS score remained stable. Patient was resting quietly with no signs of distress noted. Bed locked and in lowest position. Call bell water and personal belongings were within reach. Patient had no questions, comments or concerns after bedside shift report.  Bedside report given to Bradley Nair R.N.

## 2020-07-10 NOTE — PROGRESS NOTES
Problem: Dysphagia (Adult) Goal: *Acute Goals and Plan of Care (Insert Text) Description: Patient will: 1. Tolerate PO trials with 0 s/s overt distress in 4/5 trials 2. Utilize compensatory swallow strategies/maneuvers (decrease bite/sip, size/rate, alt. liq/sol) with min cues in 4/5 trials Recommend:  
Puree, thin liquids Meds in puree May require assistance with intake Aspiration precautions HOB >45 degrees during all intake and for at least 30 min after po Small bites/sips, slow rate of intake, alternating bites/sips Oral care three times daily Outcome: Progressing Towards Goal 
 
SPEECH LANGUAGE PATHOLOGY DYSPHAGIA TREATMENT Patient: Filipe Alvares (49 y.o. female) Date: 7/10/2020 Diagnosis: A-fib ([facility] [I48.91] CVA (cerebral vascular accident) ([facility] [I63.9] VALENTINA (acute kidney injury) ([facility] [address] [N17.9] <principal problem not specified> Precautions: aspiration, Fall, Skin PLOF:as per H&P  
 
ASSESSMENT: 
Pt seen b/s for dysphagia tx. Pt resting, elevation of HOB ineffective at waking pt, pt aroused with startle response to ice chip to lips. Pt accepted ice chip, thin via straw, puree, and mechanical soft solids. Pt without mastication of radha cracker, pt let cracker moisten on tongue prior to initiating lingual manipulation and oral transit, mild oral holding of puree as well, good oral clearing of all material.  Pt with fairly timely swallow response with fairly good laryngeal elevation, audible gulp, no overt s/sx aspiration. Rec: continue puree solids, thin liquids, aspiration precautions, feeder. Progression toward goals: 
[]         Improving appropriately and progressing toward goals [x]         Improving slowly and progressing toward goals 
[]         Not making progress toward goals and plan of care will be adjusted PLAN: 
Recommendations and Planned Interventions: 
continue puree solids, thin liquids, aspiration precautions, feeder. Patient continues to benefit from skilled intervention to address the above impairments. Continue treatment per established plan of care. Discharge Recommendations:  Home Health, [facility], and To Be Determined SUBJECTIVE:  
Patient stated Maurice King is everyone doing up sooo early in the morning?  OBJECTIVE:  
Cognitive and Communication Status: 
Neurologic State: Alert, Confused Orientation Level: Oriented to person Cognition: Impaired decision making, Decreased command following Perception: (unable to determine) Perseveration: Perseverates during conversation Safety/Judgement: Fall prevention, Lack of insight into deficits Dysphagia Treatment: 
Oral Assessment: 
Oral Assessment Labial: Decreased rate Dentition: Natural, Limited Oral Hygiene: fair Lingual: Decreased rate, Decreased strength, Incoordinated Velum: No impairment Mandible: No impairment P.O. Trials: 
 Patient Position: [facility] 55* Vocal quality prior to P.O.: No impairment Consistency Presented: Thin liquid, Puree, Mechanical soft How Presented: Self-fed/presented, SLP-fed/presented, Spoon, Straw, Successive swallows Bolus Acceptance: No impairment Bolus Formation/Control: Impaired Type of Impairment: Delayed, Mastication, Lip closure, Poor Propulsion: Delayed (# of seconds) Oral Residue: None Initiation of Swallow: No impairment Laryngeal Elevation: Functional 
 Aspiration Signs/Symptoms: None Pharyngeal Phase Characteristics: Audible swallow Effective Modifications: Alternate liquids/solids, Small sips and bites Cues for Modifications: Moderate Oral Phase Severity: Moderate Pharyngeal Phase Severity : No impairment PAIN: 
Pain level pre-treatment: 0/10 Pain level post-treatment: 0/10 Pain Intervention(s): Medication (see MAR); Rest, Ice, Repositioning Response to intervention: Nurse notified, See doc flow After treatment: []              Patient left in no apparent distress sitting up in chair 
[x]              Patient left in no apparent distress in bed 
[x]              Call bell left within reach [x]              Nursing notified 
[]              Family present 
[]              Caregiver present 
[]              Bed alarm activated COMMUNICATION/EDUCATION:  
[x] Aspiration precautions; swallow safety; compensatory techniques [x]        Patient unable to participate in education; education ongoing with staff [x]  Posted safety precautions in patient's room. [] Oral-motor/laryngeal strengthening exercises Holli Light MS, CCC/SLP Time Calculation: 29 mins

## 2020-07-10 NOTE — PROGRESS NOTES
[facility] LEVEL 2. [facility] to also call pt's sister for additional information. KANDI Mclean, [facility]- [phone]

## 2020-07-10 NOTE — PROGRESS NOTES
Problem: Diabetes Self-Management Goal: *Disease process and treatment process Description: Define diabetes and identify own type of diabetes; list 3 options for treating diabetes. Outcome: Progressing Towards Goal 
  
Problem: Falls - Risk of 
Goal: *Absence of Falls Description: Document Josebradley Cm Fall Risk and appropriate interventions in the flowsheet. Outcome: Progressing Towards Goal 
Note: Fall Risk Interventions: 
  
 
Mentation Interventions: Bed/chair exit alarm, Adequate sleep, hydration, pain control, Door open when patient unattended, Increase mobility, Room close to nurse's station Medication Interventions: Bed/chair exit alarm, Patient to call before getting OOB Elimination Interventions: Bed/chair exit alarm, Call light in reach History of Falls Interventions: Bed/chair exit alarm, Door open when patient unattended, Room close to nurse's station Problem: Pressure Injury - Risk of 
Goal: *Prevention of pressure injury Description: Document Ryan Scale and appropriate interventions in the flowsheet. Outcome: Progressing Towards Goal 
Note: Pressure Injury Interventions: 
Sensory Interventions: Assess changes in LOC, Keep linens dry and wrinkle-free, Minimize linen layers Moisture Interventions: Absorbent underpads, Minimize layers, Moisture barrier Activity Interventions: Pressure redistribution bed/mattress(bed type), Assess need for specialty bed Mobility Interventions: Assess need for specialty bed, HOB 30 degrees or less, PT/OT evaluation Nutrition Interventions: Document food/fluid/supplement intake Friction and Shear Interventions: Apply protective barrier, creams and emollients, HOB 30 degrees or less, Lift sheet, Minimize layers Problem: Patient Education: Go to Patient Education Activity Goal: Patient/Family Education Outcome: Progressing Towards Goal

## 2020-07-10 NOTE — PROGRESS NOTES
7/10/2020 10:47 AM 
 
SSN: [id number] Subjective:   56-year-old female with history of hypertension, dilated cardiomyopathy, paroxysmal atrial fibrillation, hypothyroidism, and unspecified intellectual deficit admitted on July 7 after being found on the floor by sister, gaps in the history. CT of the head had shown a probable subacute infarct in the right posterior frontal region. Because of difficulties obtaining an MRI of the brain given her confused state, which is part of her baseline, a CT of the head was repeated yesterday afternoon showing an old right MCA infarct with associated encephalomalacia and unchanged moderate small vessel disease burden without any acute intracranial hemorrhages, mass-effect, or signs of infarctions. She has history of atrial fibrillation, has had episodes of ventricular tachycardia, cardiology is following the patient as she also has a history of a cardiomyopathy. Social History Socioeconomic History  Marital status: SINGLE Spouse name: Not on file  Number of children: Not on file  Years of education: Not on file  Highest education level: Not on file Occupational History  Not on file Social Needs  Financial resource strain: Not on file  Food insecurity Worry: Not on file Inability: Not on file  Transportation needs Medical: Not on file Non-medical: Not on file Tobacco Use  Smoking status: Never Smoker  Smokeless tobacco: Never Used Substance and Sexual Activity  Alcohol use: No  
 Drug use: No  
 Sexual activity: Never Birth control/protection: None Lifestyle  Physical activity Days per week: Not on file Minutes per session: Not on file  Stress: Not on file Relationships  Social connections Talks on phone: Not on file Gets together: Not on file Attends Buddhism service: Not on file Active member of club or organization: Not on file Attends meetings of clubs or organizations: Not on file Relationship status: Not on file  Intimate partner violence Fear of current or ex partner: Not on file Emotionally abused: Not on file Physically abused: Not on file Forced sexual activity: Not on file Other Topics Concern  Not on file Social History Narrative  Not on file History reviewed. No pertinent family history. Current Facility-Administered Medications Medication Dose Route Frequency Provider Last Rate Last Dose  cholecalciferol (VITAMIN D3) capsule 5,000 Units  5,000 Units Oral DAILY Sana Ford MD   5,000 Units at 07/10/20 5646  ascorbic acid (vitamin C) (VITAMIN C) tablet 1,000 mg  1,000 mg Oral BID Sana Ford MD   1,000 mg at 07/10/20 8166  levothyroxine (SYNTHROID) tablet 100 mcg  100 mcg Oral Ronnie Nolasco MD   100 mcg at 07/09/20 1145  heparin 25,000 units in D5W 250 ml infusion  18-36 Units/kg/hr IntraVENous TITRATE Jayla Corbin MD   Stopped at 07/10/20 1057  ondansetron (ZOFRAN) injection 4 mg  4 mg IntraVENous Q6H PRN Jayla Corbin MD      
 atorvastatin (LIPITOR) tablet 80 mg  80 mg Oral QHS Jayla Corbin MD   80 mg at 07/08/20 2227  acetaminophen (TYLENOL) tablet 650 mg  650 mg Oral Q4H PRN Jayla Corbin MD      
 labetaloL (NORMODYNE;TRANDATE) 20 mg/4 mL (5 mg/mL) injection 5 mg  5 mg IntraVENous Q10MIN PRN Jayla Corbin MD      
 senna-docusate (PERICOLACE) 8.6-50 mg per tablet 2 Tab  2 Tab Oral QHS Jayla Corbin MD   2 Tab at 07/08/20 2227  cefTRIAXone (ROCEPHIN) 1 g in sterile water (preservative free) 10 mL IV syringe  1 g IntraVENous Q24H Jayla Corbin MD   1 g at 07/10/20 0023  insulin lispro (HUMALOG) injection   SubCUTAneous Jessi Corbin MD   Stopped at 07/10/20 0730  
 glucose chewable tablet 16 g  4 Tab Oral PRN Jayla Corbin MD      
 glucagon [facility]) injection 1 mg  1 mg IntraMUSCular PRN Jayla Corbin MD      
  dextrose (D50W) injection syrg 12.5-25 g  25-50 mL IntraVENous PRN Debra Vivar MD      
 erythromycin (ILOTYCIN) 5 mg/gram (0.5 %) ophthalmic ointment   Both Eyes Q4H Matthew Jimenez MD      
 famotidine (PEPCID) tablet 20 mg  20 mg Oral DAILY Debra Vivar MD   20 mg at 07/10/20 0994  carvediloL (COREG) tablet 12.5 mg  12.5 mg Oral BID WITH MEALS Wicho Rivera MD   12.5 mg at 07/10/20 0953  
 amiodarone (NEXTERONE) 360 mg in dextrose 200 mL (1.8 mg/mL) infusion  0.5-1 mg/min IntraVENous CONTINUOUS Bailey Lamas PA 16.7 mL/hr at 07/10/20 0950 0.5 mg/min at 07/10/20 0950  vits A and D-white pet-lanolin (A&D) ointment   Topical BID Debra Vivar MD      
 LORazepam (ATIVAN) injection 1 mg  1 mg IntraVENous Q2H PRN Newton Law MD   1 mg at 07/09/20 1512  sodium chloride (NS) flush 5-10 mL  5-10 mL IntraVENous PRN Debra Vivar MD      
 
 
Past Medical History:  
Diagnosis Date  Cardiac echocardiogram 02/09/2015 EF 64%. No WMA. Mild LVH. Gr 2 DDfx. RVSP 30 mmHg. Mod-severe LAE. No significant valvular heart disease.  Cardiac nuclear imaging test, abnormal 02/09/2015 LV apical defect likely c/w prior infarction w/sm area of distal anterior romulo-infarct ischemia. EF 57%. Apical WMA, likely from prior infarct. Nondiagnostic EKG on pharm stress test.  
 Chronic kidney disease, stage III (moderate) ([address] 2/6/2012  Chronic renal disease, stage III ([address]  Congestive heart failure ([address] 2/6/2012  Congestive heart failure, unspecified 01/2012 Echocardiogram with severe global hypokinesis and EF 30% consistent with dilated cardiomyopathy.  DDD (degenerative disc disease), lumbar 9/10/2016  Diabetes mellitus ([address] 01/2015 Presented with blood sugar of 637 and HbA1c 13.4  Diabetes mellitus with kidney complication ([address] 9/3/4554  Dilated cardiomyopathy ([facility] 2/6/2012  Hypertension  Hypothyroidism  Iron deficiency anemia 01/2015 Presented with Hb 5.1/ Hct 18.0. On Xarelto. No obvious source of bleeding. Did not follow-up for outpatient GI evaluation.  Low back pain 01/21/2015 Abdominal CT scan with thoracolumbar rotoscoliosis with severe multilevel degenerative disc disease and facet arthropathy; central canal stenosis at L4-L5.  Mental retardation 2/6/2012  Paroxysmal atrial fibrillation ([address] 01/2012 Presented with atrial fibrillation with RVR; converted spontaneously to sinus rhythm during hospitalization and subsequently started on amiodarone and Xarelto. History reviewed. No pertinent surgical history. No Known Allergies Vital signs:   
Visit Vitals /62 (BP 1 Location: Left arm, BP Patient Position: At rest) Pulse 90 Temp 97.8 °F (36.6 °C) Resp 17 Ht 5' (1.524 m) Wt 53.3 kg (117 lb 8.1 oz) SpO2 96% BMI 22.95 kg/m² Review of Systems:  
Not obtainable EXAM: Sleepy but arousable,  able to tell me her name, able to tell me after I repeated that she is at [facility], but unable to tell me the date. cannot sustain a conversation and she cannot follow additional simple commands, tangential. Heart is irregular. There is no gaze preference, EOM's could not be fully evaluated, PERRL, no facial asymmetries. Strength and tone appear symmetric on limited examination. There are no tremors seen. Sensory exam could not be performed. . DTR's +2, gait could not be performed. Recent Results (from the past 24 hour(s)) GLUCOSE, POC Collection Time: 07/09/20  4:24 PM  
Result Value Ref Range Glucose (POC) 304 (H) 70 - 110 mg/dL CBC W/O DIFF Collection Time: 07/09/20  5:35 PM  
Result Value Ref Range WBC 9.5 4.6 - 13.2 K/uL  
 RBC 4.27 4.20 - 5.30 M/uL  
 HGB 13.8 12.0 - 16.0 g/dL HCT 41.0 35.0 - 45.0 % MCV 96.0 74.0 - 97.0 FL  
 MCH 32.3 24.0 - 34.0 PG  
 MCHC 33.7 31.0 - 37.0 g/dL  
 RDW 14.5 11.6 - 14.5 % PLATELET 824 205 - 980 K/uL  MPV 12.3 (H) 9.2 - 11.8 FL  
 GLUCOSE, POC Collection Time: 07/09/20  9:07 PM  
Result Value Ref Range Glucose (POC) 217 (H) 70 - 110 mg/dL T4, FREE Collection Time: 07/10/20  3:10 AM  
Result Value Ref Range T4, Free 0.8 0.7 - 1.5 NG/DL  
METABOLIC PANEL, BASIC Collection Time: 07/10/20  3:10 AM  
Result Value Ref Range Sodium 141 136 - 145 mmol/L Potassium 4.4 3.5 - 5.5 mmol/L Chloride 110 100 - 111 mmol/L  
 CO2 24 21 - 32 mmol/L Anion gap 7 3.0 - 18 mmol/L Glucose 75 74 - 99 mg/dL BUN 66 (H) 7.0 - 18 MG/DL Creatinine 1.72 (H) 0.6 - 1.3 MG/DL  
 BUN/Creatinine ratio 38 (H) 12 - 20 GFR est AA 36 (L) >60 ml/min/1.73m2 GFR est non-AA 30 (L) >60 ml/min/1.73m2 Calcium 8.2 (L) 8.5 - 10.1 MG/DL  
GLUCOSE, POC Collection Time: 07/10/20  6:14 AM  
Result Value Ref Range Glucose (POC) 116 (H) 70 - 110 mg/dL PTT Collection Time: 07/10/20  9:45 AM  
Result Value Ref Range aPTT 146.4 (HH) 23.0 - 36.4 SEC Last LFTs showed a slightly elevated AST at 49. CT of the head was personally reviewed and so was serology. Charts and notes from other consultants and primary providers were reviewed. Assessment/Plan: Encephalopathy, multifactorial superimposed on chronic cerebrovascular disease without any evidence of acute stroke. Given repeated neuroimaging is not showing evidence of acute ischemia, I have no contraindications to proceed with resumption home oral anticoagulation with proper social support systems to ensure compliance in place. Recommend continue to treat metabolic derangements which currently still include a component of uremic encephalopathy. I have no further inpatient neurological recommendations, please reconsult as needed. PLEASE NOTE:  
Portions of this document may have been produced using voice recognition software. Unrecognized errors in transcription may be present. This note will not be viewable in [address]

## 2020-07-10 NOTE — ROUTINE PROCESS
Bedside and Verbal shift change report given to Ej Kennedy RN (oncoming nurse) by KAMLESH Quinn RN (offgoing nurse). Report included the following information SBAR.

## 2020-07-10 NOTE — PROGRESS NOTES
[facility] - Progress Note Admit Date: 7/7/2020 Assessment:  
 
Hospital Problems  Date Reviewed: 9/28/2016 Codes Class Noted POA Paroxysmal atrial fibrillation with RVR (HCC) ICD-10-CM: I48.0 ICD-9-CM: 427.31  7/8/2020 CVA (cerebral vascular accident) [facility]) ICD-10-CM: I63.9 ICD-9-CM: 434.91  7/8/2020 Unknown VALENTINA (acute kidney injury) ([facility] ICD-10-CM: N17.9 ICD-9-CM: 584.9  7/8/2020 Unknown Fall ICD-10-CM: W19. Donnamarie Haw ICD-9-CM: Q168.9  7/8/2020 Unknown Conjunctivitis of both eyes ICD-10-CM: H10.9 ICD-9-CM: 372.30  7/8/2020 Unknown Hypothyroid ICD-10-CM: E03.9 ICD-9-CM: 244.9  7/8/2020 Unknown Cellulitis ICD-10-CM: L03.90 ICD-9-CM: 682.9  7/8/2020 Unknown Labia enlarged ICD-10-CM: N90.60 ICD-9-CM: 624.3  7/8/2020 Unknown Elevated troponin ICD-10-CM: R79.89 ICD-9-CM: 790.6  7/8/2020 Unknown  
   
  
 
  
-CVA, h/o TIAs. Found on floor by family. Probable subacute infarct right posterior frontal region by head CT.  
-Paroxysmal atrial fibrillation. Previously on amiodarone and coreg. Appears to be off all outpatient medications. Not anticoagulated given history of significant remote GIB. -Troponin indeterminate and not consistent with ACS. ECG with nonspecific ST changes, worsened in lateral leads in setting of tachycardia. -Dilated CMY presumed due to hypertensive heart disease with EF 30% 2012 improved to 64% 2015. Nuclear stress 2015 without significant ischemia.      Ejection fraction in the 25% range on echocardiogram this admission with four-chamber dilatation.  Patient appears to be compensated without volume overload. -H/o GIB while on Xarelto, significant bleed with Hgb of 4.6 2015, but unclear source of bleeding. 
-Hypertension. Labile this admission. 
-Diabetes mellitus. HgbA1c 8.6. 
-Acute on chronic kidney disease with hyperkalemia. -Hypothyroidism. Previously on synthroid. -Mental retardation, lives alone. 
-Noncompliance which appears to be due to lack of support. 
  
Remotely followed by Dr. Pema Eng 2015. Plan:  
 
Remains in afib with controlled HR. Will continue coreg for rate control and wean off amio drip. Recommend long term [address] if able to tolerate, will defer timing to neurology team. 
No need for diuretics at this time. Ace/arb not started given low BP, renal function and hyperkalemia. No further cardiac work up. Placement evaluation noted. Subjective: No acute events. Objective:  
  
Patient Vitals for the past 8 hrs: 
 Temp Pulse Resp BP SpO2  
07/10/20 0800 97.8 °F (36.6 °C) 90 17 108/62 96 % 07/10/20 0400 97.9 °F (36.6 °C) 62 18 120/84 92 % Patient Vitals for the past 96 hrs: 
 Weight 07/09/20 2219 53.3 kg (117 lb 8.1 oz) 07/08/20 1930 49.4 kg (108 lb 14.5 oz) 07/08/20 0615 49.4 kg (109 lb) 07/07/20 2359 50.3 kg (111 lb) No intake or output data in the 24 hours ending 07/10/20 1113 Physical Exam: 
General:  no distress Neck:  no JVD Lungs:  clear to auscultation bilaterally Heart:  irregularly irregular rhythm Abdomen:  abdomen is soft without significant tenderness, masses, organomegaly or guarding Extremities:  extremities normal, atraumatic, no cyanosis or edema Data Review:  
 
Labs: Results:  
   
Chemistry Recent Labs  
  07/10/20 
0310 07/08/20 
1634 07/08/20 
0420 07/07/20 
2350 GLU 75  --  122* 123*   --  146* 144  
K 4.4  --  5.9* 5.0  
  --  115* 112* CO2 24  --  26 26 BUN 66*  --  80* 78* CREA 1.72*  --  1.88* 1.94* CA 8.2*  --  9.3 9.4 MG  --  2.0  --   --   
PHOS  --  3.5  --   --   
AGAP 7  --  5 6 BUCR 38*  --  43* 40* AP  --   --   --  90  
TP  --   --   --  6.2* ALB  --   --   --  2.5*  
GLOB  --   --   --  3.7 AGRAT  --   --   --  0.7* CBC w/Diff Recent Labs  
  07/09/20 
1735 07/09/20 
0106 07/07/20 
2350 WBC 9.5 9.3 9.4  
RBC 4.27 4.75 5.60* HGB 13.8 15.0 18.8* HCT 41.0 45.4* 53.1*  
 160 154 GRANS  --  71 82* LYMPH  --  16* 10* EOS  --  1 0 Cardiac Enzymes No results found for: CPK, CK, CKMMB, CKMB, RCK3, CKMBT, CKNDX, CKND1, PHILIP, TROPT, TROIQ, EM, TROPT, TNIPOC, BNP, BNPP Coagulation Recent Labs  
  07/10/20 
0945 07/09/20 
3229 APTT 146.4* 80.2* Lipid Panel Lab Results Component Value Date/Time Cholesterol, total 145 07/08/2020 04:20 AM  
 HDL Cholesterol 56 07/08/2020 04:20 AM  
 LDL, calculated 69 07/08/2020 04:20 AM  
 VLDL, calculated 20 07/08/2020 04:20 AM  
 Triglyceride 100 07/08/2020 04:20 AM  
 CHOL/HDL Ratio 2.6 07/08/2020 04:20 AM  
  
BNP No results found for: BNP, BNPP, XBNPT Liver Enzymes Recent Labs  
  07/07/20 
2350 TP 6.2* ALB 2.5* AP 90 Digoxin Thyroid Studies Lab Results Component Value Date/Time TSH 4.90 (H) 07/07/2020 11:50 PM  
    
 
Signed By: RONALDO Francois   
 July 10, 2020

## 2020-07-10 NOTE — PROGRESS NOTES
ARU/IPR REFERRAL CONTACT NOTE [id number] [place] [address] for Physical Rehabilitation RE:  Andrea Ryan Thank you for the opportunity to review this patient's case for admission to [address] [place] [address] for Physical Rehabilitation. Based on our pre-admission screening:  
 
 
Horace ] This patient does not meet criteria for admission to [facility] for Physical Rehabilitation: 
 
Horace ] Too low level, per documentation, patient has not demonstrated tolerance for acute rehabilitation level of intensity; therapies recommending SNF v LTC Thank you for this referral.  
Please do not hesitate to call if you need further information or have additional questions. Regards, 
 
RYAN Gomez PTA for Prerna Simpson Admissions [facility] Physical Rehabilitation 
[phone]

## 2020-07-10 NOTE — PROGRESS NOTES
Admit Date: 7/7/2020 Date of Service: 7/10/2020 Reason for follow-up: Syncope Assessment: CVA with a history of TIA -admitted because she was found on the floor by family. Evaluated by neurology. On aspirin Lipitor beta-blocker heparin drip. Suspect subacute infarct of the right posterior frontal region Paroxysmal atrial fibrillation: Previously on amiodarone and Coreg but was noncompliant with medications - IV amiodarone d/c'd 
 - off heparin drip; ASA and Eliquis started on 7/10 Indeterminate troponins: Audiology following, EKG with nonspecific ST changes. electrolyte abnormalities: Hyponatremia (resolved), hyperkalemia (resolved) VALENTINA: Slightly improved with IV fluid hydration Multiple skin lesions and areas of pressure injury, all POA: Please refer to wound care consult note for detailed description of location of each of the 10+ wounds. S several of the skin lesions are possibly indicative of vitamin deficiencies. Hypothyroidism: Was started on initial IV Synthroid. Plans to de-escalate to p.o. when free T4 improved. dilated cardiomyopathy due to hypertensive heart disease: Prior EF 30% in 2012. Repeat echocardiogram pending. Hypertension:  controlled Diabetes mellitus type 2: Uncontrolled with hemoglobin A1c is 8.6 Mental retardation with noncompliance of medication and medical regimen. Metabolic encephalopathy:  Superimposed with chronic cerebrovascular disease 
 - unable to perform MRI Plan: Wound care consulted for assistance in management of multiple skin lesions. f/u vitamin C, thiamine levels Continue aspirin, statin, Synthroid (transition to po from IV) Discussed with Cardiology: d/c heparin drip, start po Eliquis and ASA - off IV amiodarone Continue Coreg; hold lisinopril for now. Daily CBC CMP, free T4 VIt D 5000 units, Vit C Continue with erythromycin drops to the eyes bilaterally Continue work with PT and OT-urged patient to participate. F/u blood cx from  IV iron supplementation Continue with IV fluids, nutritional support as outlined by registered dietitian. Ativan as needed for anxiety D/c Rocephin- cultures negative; UA without pyuria Patient is full code Case management has contacted Adult Protective Services on 2020 Current Antibtiocs:  
Rocephin Lines:  
Peripheral 
 
Case discussed with:  [x]Patient  [x]Family  [x]Nursing  [x]Case Management DVT Prophylaxis:  []Lovenox  []Hep SQ  []SCDs  []Coumadin   [x]On Heparin gtt I have independently examined the patient and reviewed all lab studies and imgaing as well as review of nursing notes and physican notes from the past 24 hours. Emry Schlatter D.O. Pager [phone] No Known Allergies Subjective:  
  
Pt seen and examined. Patient is awake and alert. Calmer than yesterday. Hungry and asking for pancakes and waffles. Easily responds to questions. MRI was not able to be performed due to anxiety. Objective:  
 
  
Visit Vitals BP 98/68 Pulse 83 Temp 97.4 °F (36.3 °C) Resp 18 Ht 5' (1.524 m) Wt 53.3 kg (117 lb 8.1 oz) SpO2 97% BMI 22.95 kg/m² Temp (24hrs), Av.6 °F (36.4 °C), Min:97.4 °F (36.3 °C), Max:97.9 °F (36.6 °C) General:   awake alert and oriented to person and place. She is disheveled and poorly groomed Skin:   Multiple skin lesions noted and as documented per wound care. Most notable is bilateral lower extremity erythema with patches of eschar and excoriating skin. Periorbital skin is red with areas of excoriation. HEENT:  No scleral icterus or pallor; oral mucosa moist, lips moist  
Lymph Nodes:   not assessed today Lungs:   non, labored; bilaterally clear to aspiration- no crackles wheezes rales or rhonchi Heart:   Tachycardic and irregular, s1 and s2; no murmurs rubs or gallops; 2+ edema, + pedal pulses Abdomen:  soft, non-distended, active bowel sounds, non-tender Genitourinary:  Labia and perineum swollen red tender to touch. There is no crepitus noted. there is some skin sloughing. Extremities:   Decreased muscle mass; mild contractures of the LE, no joint effusions, Neurologic:  No gross focal motor or sensory abnormalities; CN 2-12 intact; Follows commands. General weakness Psychiatric:   calmer Labs: Results:  
Chemistry Recent Labs  
  07/10/20 
0310 07/08/20 
0420 07/07/20 
2350 GLU 75 122* 123*  146* 144  
K 4.4 5.9* 5.0  
 115* 112* CO2 24 26 26 BUN 66* 80* 78* CREA 1.72* 1.88* 1.94* CA 8.2* 9.3 9.4 AGAP 7 5 6 BUCR 38* 43* 40* AP  --   --  90  
TP  --   --  6.2* ALB  --   --  2.5*  
GLOB  --   --  3.7 AGRAT  --   --  0.7* CBC w/Diff Recent Labs  
  07/09/20 
1735 07/09/20 
0106 07/07/20 
2350 WBC 9.5 9.3 9.4  
RBC 4.27 4.75 5.60* HGB 13.8 15.0 18.8* HCT 41.0 45.4* 53.1*  
 160 154 GRANS  --  71 82* LYMPH  --  16* 10* EOS  --  1 0 No results found for: SDES Lab Results Component Value Date/Time Culture result: NO GROWTH 2 DAYS 07/08/2020 12:05 AM  
 Culture result: NO GROWTH 2 DAYS 07/07/2020 11:50 PM  
  
 
Results Procedure Component Value Units Date/Time WET PREP [[id number]] Collected:  07/08/20 0510 Order Status:  Completed Specimen:  Vagina Updated:  07/08/20 5296 Special Requests: NO SPECIAL REQUESTS Wet prep NO YEAST,TRICHOMONAS OR CLUE CELLS NOTED CULTURE, BLOOD [[id number]] Collected:  07/08/20 0005 Order Status:  Completed Specimen:  Blood Updated:  07/10/20 0306 Special Requests: NO SPECIAL REQUESTS Culture result: NO GROWTH 2 DAYS     
 CULTURE, BLOOD [[id number]] Collected:  07/07/20 2350 Order Status:  Completed Specimen:  Blood Updated:  07/10/20 5519 Special Requests: NO SPECIAL REQUESTS Culture result: NO GROWTH 2 DAYS Imaging:  
 
Ct Head Wo Cont Result Date: 7/10/2020 IMPRESSION: 1. No acute intracranial hemorrhage, mass effect or midline structural shift. 2. Old right MCA infarct with associated encephalomalacia. Unchanged moderate burden of small vessel disease. Follow-up with MRI is recommended if acute ischemia is suspected given limitations of CT. Ct Head Wo Cont Result Date: 7/8/2020 IMPRESSION: Probable subacute infarct right posterior frontal region. All CT scans at this facility are performed using dose optimization technique as appropriate to the performed exam, to include automated exposure control, adjustment of the mA and/or kV according to patient's size (Including appropriate matching for site-specific examinations), or use of iterative reconstruction technique. Ct Pelv Wo Cont Result Date: 7/8/2020 IMPRESSION: Additional deformity of the hips. No acute osseous abnormality evident. Diffuse soft tissue edema is present. No soft tissue gas is seen. Diffuse gaseous distention of bowel. No pelvic mass is apparent. Nonobstructive lower pole right renal calculus. There is lobular projection of density from the anus-correlate with direct inspection. Xr Chest [facility] Result Date: 7/8/2020 IMPRESSION: Stable cardiomegaly. Incomplete penetration to the left heart with inability to evaluate for underlying infiltrate, atelectasis, or small pleural effusion.

## 2020-07-10 NOTE — PROGRESS NOTES
Discussed pt's dispo with pt's sister Leobardo Amanda. She stated she is looking into assisted living, nursing facilities and group homes. answered her questions. She stated she contacted a couple of facilities and she will call  back next week. KIM CrawfordN RN Care Management Pager: [phone]

## 2020-07-10 NOTE — PROGRESS NOTES
Physical Therapy Note: Unable to see patient for skilled PT session at [phone], patient is sleeping soundly. Will follow up as schedule permits.   
 
Maria Fernanda Delatorre PT, DPT

## 2020-07-10 NOTE — ROUTINE PROCESS
Bedside and Verbal shift change report given to Madan Smith and Shamir Van (oncoming nurse) by Santhosh Calles (offgoing nurse). Report included the following information SBAR, Kardex, Intake/Output, MAR and Recent Results.

## 2020-07-11 NOTE — PROGRESS NOTES
[address] [facility] [phone] Message on answering machine from Friday, returned call on weekend, left voicemail with contact information for 4S . KANDI Walden Case Management [phone]

## 2020-07-11 NOTE — PROGRESS NOTES
[facility] Patient: Yolanda Veliz Age: 71 y.o. : 1955 MR#: [id number] SSN: [id number] Date/Time: 2020 Subjective: Patient alert awake, confused but calm and cooperative. Denies any complaints. But on touching the legs she complains she is having pain all over the legs. Assessment/Plan: 1. Acute on chronic encephalopathy:  Superimposed with chronic cerebrovascular disease, patient almost baseline per family. 2. No acute CVA per neuro: admitted because she was found on the floor by family. Continue aspirin Lipitor beta-blocker, patient could not tolerate MRI, repeat CT negative for acute stroke. 3. Paroxysmal atrial fibrillation: Continue Coreg, Eliquis. Cardiology signed off. 
4. Indeterminate troponins: Not consistent with ACS, cardiology no further work-up. 5. Cardiomyopathy ejection fraction of 25%: Well compensated, continue Eliquis, beta-blocker and statin. No ACE inhibitor due to low normal blood pressure. 6. VALENTINA: Slightly improved with IV fluid hydration 7. Multiple skin lesions and areas of pressure injury, all POA: Please refer to wound care consult note for detailed description of location of each of the 10+ wounds. S several of the skin lesions are possibly indicative of vitamin deficiencies. Some mild erythema redness noted but no signs of active infection. 8. Hypothyroidism: Continue Synthroid. 9. Hypertension: BP stable, continue Coreg 10. Diabetes mellitus type 2:  BS stable, continue SSI 11. Mental retardation with noncompliance of medication and medical regimen. Continue supportive care. 12.  Full code Discussed with the patient at bedside Discussed with patient's Sister John Canales over the phone in length in detail, per MsTerese Canales patient has other sister together to make decisions.   Discussed about advanced directives, for now she wants full code but she will discuss with other sister and make further plans. Sister is looking at facilities for the possible discharge planning. Sister is requesting for PPD testing and also she thinks that some facilities might require psych eval but she will confirm and let us know. I also discussed with her about ordering cotesting for disposition, she agreed with the plan. I spent 40 minutes with the patient in face-to-face consultation, of which greater than 50% was spent in counseling and coordination of care as described above. Case discussed with:  [x]Patient  [x]Family  []Nursing  []Case Management DVT Prophylaxis:   Eliquis[x][]Hep SQ  []SCDs  []Coumadin   []Eliquis/Xarelto Objective:  
VS:  
Visit Vitals /75 (BP 1 Location: Left arm, BP Patient Position: At rest) Pulse 88 Temp 97.7 °F (36.5 °C) Resp 18 Ht 5' (1.524 m) Wt 53.3 kg (117 lb 8.1 oz) SpO2 95% BMI 22.95 kg/m² Tmax/24hrs: Temp (24hrs), Av.5 °F (36.4 °C), Min:97.1 °F (36.2 °C), Max:97.9 °F (36.6 °C) IOBRIEFNo intake or output data in the 24 hours ending 20 1158 General:  Alert, cooperative, no acute distress, confused HEENT: PERRLA, anicteric sclerae. Pulmonary:  CTA Bilaterally. No Wheezing/Rales. Cardiovascular: Regular rate and Rhythm. GI:  Soft, Non distended, Non tender. + Bowel sounds. Neurologic: Alert and oriented X 1. Moves all ext. Additional: Right inner thigh mild swelling and tender to touch, multiple erythematous patches with eschar and excoriated skin with ulcers with some redness noted in bilateral lower extremity. Medications:  
Current Facility-Administered Medications Medication Dose Route Frequency  apixaban (ELIQUIS) tablet 2.5 mg  2.5 mg Oral BID  thiamine HCL (B-1) tablet 100 mg  100 mg Oral DAILY  ferrous sulfate tablet 325 mg  1 Tab Oral DAILY WITH BREAKFAST  0.9% sodium chloride infusion  125 mL/hr IntraVENous CONTINUOUS  
  cholecalciferol (VITAMIN D3) capsule 5,000 Units  5,000 Units Oral DAILY  ascorbic acid (vitamin C) (VITAMIN C) tablet 1,000 mg  1,000 mg Oral BID  levothyroxine (SYNTHROID) tablet 100 mcg  100 mcg Oral 6am  
 ondansetron (ZOFRAN) injection 4 mg  4 mg IntraVENous Q6H PRN  
 atorvastatin (LIPITOR) tablet 80 mg  80 mg Oral QHS  acetaminophen (TYLENOL) tablet 650 mg  650 mg Oral Q4H PRN  
 labetaloL (NORMODYNE;TRANDATE) 20 mg/4 mL (5 mg/mL) injection 5 mg  5 mg IntraVENous Q10MIN PRN  
 senna-docusate (PERICOLACE) 8.6-50 mg per tablet 2 Tab  2 Tab Oral QHS  insulin lispro (HUMALOG) injection   SubCUTAneous TIDAC  glucose chewable tablet 16 g  4 Tab Oral PRN  
 glucagon (GLUCAGEN) injection 1 mg  1 mg IntraMUSCular PRN  
 dextrose (D50W) injection syrg 12.5-25 g  25-50 mL IntraVENous PRN  
 erythromycin (ILOTYCIN) 5 mg/gram (0.5 %) ophthalmic ointment   Both Eyes Q4H  
 famotidine (PEPCID) tablet 20 mg  20 mg Oral DAILY  carvediloL (COREG) tablet 12.5 mg  12.5 mg Oral BID WITH MEALS  vits A and D-white pet-lanolin (A&D) ointment   Topical BID  LORazepam (ATIVAN) injection 1 mg  1 mg IntraVENous Q2H PRN  
 sodium chloride (NS) flush 5-10 mL  5-10 mL IntraVENous PRN Labs:   
Recent Results (from the past 24 hour(s)) GLUCOSE, POC Collection Time: 07/10/20  4:09 PM  
Result Value Ref Range Glucose (POC) 92 70 - 110 mg/dL CBC W/O DIFF Collection Time: 07/10/20  4:59 PM  
Result Value Ref Range WBC 8.7 4.6 - 13.2 K/uL  
 RBC 3.99 (L) 4.20 - 5.30 M/uL  
 HGB 12.7 12.0 - 16.0 g/dL HCT 38.0 35.0 - 45.0 % MCV 95.2 74.0 - 97.0 FL  
 MCH 31.8 24.0 - 34.0 PG  
 MCHC 33.4 31.0 - 37.0 g/dL  
 RDW 14.2 11.6 - 14.5 % PLATELET 799 026 - 420 K/uL MPV 12.3 (H) 9.2 - 11.8 FL  
T4, FREE Collection Time: 07/11/20  6:57 AM  
Result Value Ref Range T4, Free 0.8 0.7 - 1.5 NG/DL  
METABOLIC PANEL, BASIC Collection Time: 07/11/20  6:57 AM  
Result Value Ref Range Sodium 144 136 - 145 mmol/L Potassium 4.0 3.5 - 5.5 mmol/L Chloride 114 (H) 100 - 111 mmol/L  
 CO2 21 21 - 32 mmol/L Anion gap 9 3.0 - 18 mmol/L Glucose 135 (H) 74 - 99 mg/dL BUN 47 (H) 7.0 - 18 MG/DL Creatinine 1.30 0.6 - 1.3 MG/DL  
 BUN/Creatinine ratio 36 (H) 12 - 20 GFR est AA 50 (L) >60 ml/min/1.73m2 GFR est non-AA 41 (L) >60 ml/min/1.73m2 Calcium 8.0 (L) 8.5 - 10.1 MG/DL  
GLUCOSE, POC Collection Time: 07/11/20  9:30 AM  
Result Value Ref Range Glucose (POC) 136 (H) 70 - 110 mg/dL GLUCOSE, POC Collection Time: 07/11/20 11:25 AM  
Result Value Ref Range Glucose (POC) 214 (H) 70 - 110 mg/dL Signed By: Chantel Chavez MD   
 July 11, 2020 Disclaimer: Sections of this note are dictated using utilizing voice recognition software. Minor typographical errors may be present. If questions arise, please do not hesitate to contact me or call our department.

## 2020-07-11 NOTE — PROGRESS NOTES
Received bedside report from  Arnulfo Valdez RN. Pt Confused/Mental disability, Tele box #84 A-fib, RA, BLE/BUE contracted, pt resting in bed/bed in low position, wheels locked. 1900-Bedside and Verbal shift change report given to Amanda Oliveira (oncoming nurse) by Saint Martin RN (offgoing nurse). Report included the following information SBAR, Kardex, MAR and Cardiac Rhythm A-fib.

## 2020-07-12 NOTE — PROGRESS NOTES
[facility] Progress Note Patient: Benson Lees Age: 71 y.o. : 1955 MR#: [id number] SSN: [id number] Date/Time: 2020 Subjective: Patient alert awake, confused but calm and cooperative. Denies any complaints. Tolerating p.o. well with assistance. Assessment/Plan: 1. Acute on chronic encephalopathy:  Superimposed with chronic cerebrovascular disease, patient almost baseline per family. 2. No acute CVA per neuro: admitted because she was found on the floor by family. Continue aspirin Lipitor beta-blocker, patient could not tolerate MRI, repeat CT negative for acute stroke. 3. Paroxysmal atrial fibrillation: Continue Coreg, Eliquis. Cardiology signed off. 
4. Indeterminate troponins: Not consistent with ACS, cardiology no further work-up. 5. Cardiomyopathy ejection fraction of 25%: Well compensated, continue Eliquis, beta-blocker and statin. No ACE inhibitor due to low normal blood pressure. 6. VALENTINA on CKD stage II: Creatinine back to baseline. 7. Multiple skin lesions and areas of pressure injury, all POA: Please refer to wound care consult note for detailed description of location of each of the 10+ wounds. S several of the skin lesions are possibly indicative of vitamin deficiencies. Some mild erythema redness noted but no signs of active infection. 8. Hypothyroidism: Continue Synthroid. 9. Hypertension: BP stable, continue Coreg 10. Diabetes mellitus type 2:  BS stable, continue SSI 11. Mental retardation with noncompliance of medication and medical regimen. Continue supportive care. 12.  Full code PPD testing pending Discussed with the patient at bedside Will discuss with case management and sister for further discharge planning. Case discussed with:  [x]Patient  []Family  []Nursing  []Case Management DVT Prophylaxis:   Eliquis[x][]Hep SQ  []SCDs  []Coumadin   []Eliquis/Xarelto Objective:  
VS:  
Visit Vitals /90 (BP 1 Location: Left arm, BP Patient Position: At rest) Pulse 61 Temp 97.6 °F (36.4 °C) Resp 20 Ht 5' (1.524 m) Wt 56.2 kg (123 lb 14.4 oz) SpO2 97% BMI 24.20 kg/m² Tmax/24hrs: Temp (24hrs), Av.6 °F (36.4 °C), Min:97.2 °F (36.2 °C), Max:97.8 °F (36.6 °C) IOBRIEF Intake/Output Summary (Last 24 hours) at 2020 1040 Last data filed at 2020 9926 Gross per 24 hour Intake 120 ml Output  Net 120 ml General:  Alert, cooperative, no acute distress, confused Pulmonary:  CTA Bilaterally. No Wheezing/Rales. Cardiovascular: Regular rate and Rhythm. GI:  Soft, Non distended, Non tender. + Bowel sounds. Neurologic: Alert and oriented X 1. Moves all extremities but limited in lower extremity,? Lower extremity contracted at the hip joint. Additional: Right inner thigh mild swelling and tender to touch, multiple erythematous patches with eschar and excoriated skin with ulcers with some redness noted in bilateral lower extremity. Medications:  
Current Facility-Administered Medications Medication Dose Route Frequency  carvediloL (COREG) tablet 6.25 mg  6.25 mg Oral BID WITH MEALS  tuberculin injection 5 Units  5 Units IntraDERMal ONCE  
 apixaban (ELIQUIS) tablet 2.5 mg  2.5 mg Oral BID  thiamine HCL (B-1) tablet 100 mg  100 mg Oral DAILY  ferrous sulfate tablet 325 mg  1 Tab Oral DAILY WITH BREAKFAST  cholecalciferol (VITAMIN D3) capsule 5,000 Units  5,000 Units Oral DAILY  ascorbic acid (vitamin C) (VITAMIN C) tablet 1,000 mg  1,000 mg Oral BID  levothyroxine (SYNTHROID) tablet 100 mcg  100 mcg Oral 6am  
 ondansetron (ZOFRAN) injection 4 mg  4 mg IntraVENous Q6H PRN  
 atorvastatin (LIPITOR) tablet 80 mg  80 mg Oral QHS  acetaminophen (TYLENOL) tablet 650 mg  650 mg Oral Q4H PRN  
 labetaloL (NORMODYNE;TRANDATE) 20 mg/4 mL (5 mg/mL) injection 5 mg  5 mg IntraVENous Q10MIN PRN  
  senna-docusate (PERICOLACE) 8.6-50 mg per tablet 2 Tab  2 Tab Oral QHS  insulin lispro (HUMALOG) injection   SubCUTAneous TIDAC  glucose chewable tablet 16 g  4 Tab Oral PRN  
 glucagon (GLUCAGEN) injection 1 mg  1 mg IntraMUSCular PRN  
 dextrose (D50W) injection syrg 12.5-25 g  25-50 mL IntraVENous PRN  
 erythromycin (ILOTYCIN) 5 mg/gram (0.5 %) ophthalmic ointment   Both Eyes Q4H  
 famotidine (PEPCID) tablet 20 mg  20 mg Oral DAILY  vits A and D-white pet-lanolin (A&D) ointment   Topical BID  LORazepam (ATIVAN) injection 1 mg  1 mg IntraVENous Q2H PRN  
 sodium chloride (NS) flush 5-10 mL  5-10 mL IntraVENous PRN Labs:   
Recent Results (from the past 24 hour(s)) GLUCOSE, POC Collection Time: 07/11/20 11:25 AM  
Result Value Ref Range Glucose (POC) 214 (H) 70 - 110 mg/dL GLUCOSE, POC Collection Time: 07/11/20  4:07 PM  
Result Value Ref Range Glucose (POC) 172 (H) 70 - 110 mg/dL GLUCOSE, POC Collection Time: 07/11/20  9:20 PM  
Result Value Ref Range Glucose (POC) 173 (H) 70 - 110 mg/dL SARS-COV-2 Collection Time: 07/11/20 11:33 PM  
Result Value Ref Range SARS-CoV-2 PENDING Specimen source Nasopharyngeal    
GLUCOSE, POC Collection Time: 07/12/20  6:23 AM  
Result Value Ref Range Glucose (POC) 137 (H) 70 - 110 mg/dL T4, FREE Collection Time: 07/12/20  8:29 AM  
Result Value Ref Range T4, Free 1.4 0.7 - 1.5 NG/DL  
METABOLIC PANEL, BASIC Collection Time: 07/12/20  8:29 AM  
Result Value Ref Range Sodium 145 136 - 145 mmol/L Potassium 4.3 3.5 - 5.5 mmol/L Chloride 113 (H) 100 - 111 mmol/L  
 CO2 25 21 - 32 mmol/L Anion gap 7 3.0 - 18 mmol/L Glucose 143 (H) 74 - 99 mg/dL BUN 41 (H) 7.0 - 18 MG/DL Creatinine 1.31 (H) 0.6 - 1.3 MG/DL  
 BUN/Creatinine ratio 31 (H) 12 - 20 GFR est AA 49 (L) >60 ml/min/1.73m2 GFR est non-AA 41 (L) >60 ml/min/1.73m2  Calcium 8.5 8.5 - 10.1 MG/DL  
CBC WITH AUTOMATED DIFF  
 Collection Time: 07/12/20  8:29 AM  
Result Value Ref Range WBC 8.2 4.6 - 13.2 K/uL  
 RBC 4.27 4.20 - 5.30 M/uL  
 HGB 13.7 12.0 - 16.0 g/dL HCT 40.4 35.0 - 45.0 % MCV 94.6 74.0 - 97.0 FL  
 MCH 32.1 24.0 - 34.0 PG  
 MCHC 33.9 31.0 - 37.0 g/dL  
 RDW 14.1 11.6 - 14.5 % PLATELET 445 051 - 130 K/uL MPV 12.6 (H) 9.2 - 11.8 FL  
 NEUTROPHILS 73 40 - 73 % LYMPHOCYTES 14 (L) 21 - 52 % MONOCYTES 10 3 - 10 % EOSINOPHILS 3 0 - 5 % BASOPHILS 0 0 - 2 %  
 ABS. NEUTROPHILS 6.0 1.8 - 8.0 K/UL  
 ABS. LYMPHOCYTES 1.2 0.9 - 3.6 K/UL  
 ABS. MONOCYTES 0.8 0.05 - 1.2 K/UL  
 ABS. EOSINOPHILS 0.2 0.0 - 0.4 K/UL  
 ABS. BASOPHILS 0.0 0.0 - 0.1 K/UL  
 DF AUTOMATED PHOSPHORUS Collection Time: 07/12/20  8:29 AM  
Result Value Ref Range Phosphorus 2.3 (L) 2.5 - 4.9 MG/DL MAGNESIUM Collection Time: 07/12/20  8:29 AM  
Result Value Ref Range Magnesium 2.0 1.6 - 2.6 mg/dL Signed By: Ivon Mckenzie MD   
 July 12, 2020 Disclaimer: Sections of this note are dictated using utilizing voice recognition software. Minor typographical errors may be present. If questions arise, please do not hesitate to contact me or call our department.

## 2020-07-12 NOTE — PROGRESS NOTES
Bedside shift change report given to Annette RAHMANRN (oncoming nurse) by Moon López (offgoing nurse). Report included the following information SBAR and Kardex.

## 2020-07-12 NOTE — PROGRESS NOTES
Received bedside report from  Olin Homans RN. Pt Confused/Mental disability, Tele box #84 A-fib, RA, BLE/BUE contracted, pt resting in bed/bed in low position, wheels locked. 1500-Rt. Forearm PPD skin test read @12 hours  Negative no induration/redness noted. 1900-Bedside and Verbal shift change report given to [facility] (oncoming nurse) by Saint Martin RN (offgoing nurse). Report included the following information SBAR, Kardex, MAR and Cardiac Rhythm A-fib.

## 2020-07-13 NOTE — PROGRESS NOTES
Pt's clinicals sent to multiple facilities in [facility] and [facility]"" for review for long term placement. Spoke with Van. She accepted pt for [facility] and rehab for long term placement. Updated pt's sister Ganesh Sterling. Lyla Victor, BSN RN Care Management Pager: [phone]

## 2020-07-13 NOTE — PROGRESS NOTES
Bedside shift change report given to [address] [place] [address] Christian (oncoming nurse) by Jayda Curry (offgoing nurse). Report included the following information SBAR and Kardex.

## 2020-07-13 NOTE — PROGRESS NOTES
[facility]  Progress Note    Patient: Tracey Negrete Age: 71 y.o. : 1955 MR#: [id number] SSN: [id number]  Date/Time: 2020     Subjective: Patient alert awake, confused but calm and cooperative. Denies any complaints. Tolerating p.o. well with assistance. Assessment/Plan:     1. Acute on chronic encephalopathy:  Superimposed with chronic cerebrovascular disease, patient almost baseline per family. 2. No acute CVA per neuro: admitted because she was found on the floor by family. Continue aspirin Lipitor beta-blocker, patient could not tolerate MRI, repeat CT negative for acute stroke. 3. Paroxysmal atrial fibrillation: Continue Coreg, Eliquis. Cardiology signed off.  4. Indeterminate troponins: Not consistent with ACS, cardiology no further work-up. 5. Cardiomyopathy ejection fraction of 25%: Well compensated, continue Eliquis, beta-blocker and statin. No ACE inhibitor due to low normal blood pressure. 6. VALENTINA on CKD stage II: Creatinine back to baseline. 7. Multiple skin lesions and areas of pressure injury, all POA: Please refer to wound care consult note for detailed description of location of each of the 10+ wounds. S several of the skin lesions are possibly indicative of vitamin deficiencies. Some mild erythema redness noted but no signs of active infection. 8. Right thigh mild swelling with ecchymosis: No change in last 3 days, do not suspect active bleeding. 9. Hypothyroidism: Continue Synthroid. 10. Hypertension: BP stable, continue Coreg  11. Diabetes mellitus type 2:  BS stable, continue SSI  12. Mental retardation with noncompliance of medication and medical regimen. Continue supportive care. 12.  Full code  PPD testing pending  Discussed with the patient at bedside  Will discuss with case management, awaiting placement and pending level 2.   Called and left a message to Pinky Korey        Case discussed with:  [x]Patient  []Family [x]Nursing  []Case Management  DVT Prophylaxis:   Eliquis[x][]Hep SQ  []SCDs  []Coumadin   []Eliquis/Xarelto     Objective:   VS:   Visit Vitals  /82 (BP 1 Location: Right arm, BP Patient Position: Supine)   Pulse 60   Temp 97.5 °F (36.4 °C)   Resp 18   Ht 5' (1.524 m)   Wt 56.2 kg (123 lb 14.4 oz)   SpO2 99%   BMI 24.20 kg/m²      Tmax/24hrs: Temp (24hrs), Av.4 °F (36.3 °C), Min:97.3 °F (36.3 °C), Max:97.5 °F (36.4 °C)  IOBRIEF    Intake/Output Summary (Last 24 hours) at 2020 1511  Last data filed at 2020 1835  Gross per 24 hour   Intake 120 ml   Output    Net 120 ml       General:  Alert, cooperative, no acute distress, confused    Pulmonary:  CTA Bilaterally. No Wheezing/Rales. Cardiovascular: Regular rate and Rhythm. GI:  Soft, Non distended, Non tender. + Bowel sounds. Neurologic: Alert and oriented X 1. Moves all extremities but limited in lower extremity,? Lower extremity contracted at the hip joint and abducted with open pelvis. .  Additional: Right inner thigh mild swelling with ecchymosis, multiple erythematous patches with eschar and excoriated skin with ulcers with some redness noted in bilateral lower extremity.     Medications:   Current Facility-Administered Medications   Medication Dose Route Frequency    carvediloL (COREG) tablet 6.25 mg  6.25 mg Oral BID WITH MEALS    apixaban (ELIQUIS) tablet 2.5 mg  2.5 mg Oral BID    thiamine HCL (B-1) tablet 100 mg  100 mg Oral DAILY    ferrous sulfate tablet 325 mg  1 Tab Oral DAILY WITH BREAKFAST    cholecalciferol (VITAMIN D3) capsule 5,000 Units  5,000 Units Oral DAILY    ascorbic acid (vitamin C) (VITAMIN C) tablet 1,000 mg  1,000 mg Oral BID    levothyroxine (SYNTHROID) tablet 100 mcg  100 mcg Oral 6am    ondansetron (ZOFRAN) injection 4 mg  4 mg IntraVENous Q6H PRN    atorvastatin (LIPITOR) tablet 80 mg  80 mg Oral QHS    acetaminophen (TYLENOL) tablet 650 mg  650 mg Oral Q4H PRN    labetaloL (NORMODYNE;TRANDATE) 20 mg/4 mL (5 mg/mL) injection 5 mg  5 mg IntraVENous Q10MIN PRN    senna-docusate (PERICOLACE) 8.6-50 mg per tablet 2 Tab  2 Tab Oral QHS    insulin lispro (HUMALOG) injection   SubCUTAneous TIDAC    glucose chewable tablet 16 g  4 Tab Oral PRN    glucagon (GLUCAGEN) injection 1 mg  1 mg IntraMUSCular PRN    dextrose (D50W) injection syrg 12.5-25 g  25-50 mL IntraVENous PRN    erythromycin (ILOTYCIN) 5 mg/gram (0.5 %) ophthalmic ointment   Both Eyes Q4H    famotidine (PEPCID) tablet 20 mg  20 mg Oral DAILY    vits A and D-white pet-lanolin (A&D) ointment   Topical BID    LORazepam (ATIVAN) injection 1 mg  1 mg IntraVENous Q2H PRN    sodium chloride (NS) flush 5-10 mL  5-10 mL IntraVENous PRN       Labs:    Recent Results (from the past 24 hour(s))   GLUCOSE, POC    Collection Time: 07/12/20  4:40 PM   Result Value Ref Range    Glucose (POC) 196 (H) 70 - 110 mg/dL   GLUCOSE, POC    Collection Time: 07/12/20  9:25 PM   Result Value Ref Range    Glucose (POC) 231 (H) 70 - 110 mg/dL   GLUCOSE, POC    Collection Time: 07/13/20  6:20 AM   Result Value Ref Range    Glucose (POC) 158 (H) 70 - 110 mg/dL   GLUCOSE, POC    Collection Time: 07/13/20 11:52 AM   Result Value Ref Range    Glucose (POC) 169 (H) 70 - 110 mg/dL       Signed By: Kelly Downing MD     July 13, 2020      Disclaimer: Sections of this note are dictated using utilizing voice recognition software. Minor typographical errors may be present. If questions arise, please do not hesitate to contact me or call our department.

## 2020-07-13 NOTE — PROGRESS NOTES
Problem: Mobility Impaired (Adult and Pediatric) Goal: *Acute Goals and Plan of Care (Insert Text) Description: Physical Therapy Goals Initiated 7/8/2020 and to be accomplished within 7 day(s) 3 DAY TRIAL 1. Patient will roll side to side in bed with moderate assistance . 2.  Patient will move from supine to sit and sit to supine  in bed with maximal assistance. 3.  Patient will perform sitting edge of bed for 5-10 minutes with fair balance. 4.  Patient will transfer from bed to chair and chair to bed with maximal assistance using the least restrictive device. 5.  Patient will perform sit to stand with maximal assistance. 6.  Patient participate in gait and stair training when appropriate. Prior Level of Function:  
Pt does not seem to be the best historian and needed to be asked PLOF questions a couple of times for answers. Patient was independence for all mobility including gait using a cane. Patient lives alone in a single story home 1 step to enter. Pt states she has a walker at home if needed. Outcome: Progressing Towards Goal 
  
PHYSICAL THERAPY TREATMENT AND DISCHARGE Patient: Esha Diaz (59 y.o. female) Date: 7/13/2020 Diagnosis: A-fib ([facility] [I48.91] CVA (cerebral vascular accident) ([facility] [I63.9] VALENTINA (acute kidney injury) ([facility] [N17.9] <principal problem not specified> Precautions: Fall, Skin PLOF: see above ASSESSMENT: 
Pt cleared for PT treatment session per nursing. Pt received in supine with HOB elevated, hips and knees extremely flexed and hips externally rotated, and agreeable to treatment session. Pt required total A for partial roll, bilaterally. Max cues required for attention to task due to decreased command following. Attempted to extend LEs to decrease contractures and improve positioning. Pt also winces in pain with all movements and does not tolerate minimal activity well.  Plan to D/C at this time due to lack of meaningful participation and progress in this setting during 3 day trial. Pt left in supine, active bed alarm, and all needs met/within reach. PLAN: 
Maximum therapeutic gains met at current level of care and patient will be discharged from physical therapy at this time. Rationale for discharge: 
[]     Goals Achieved 
[]     Plateau Reached 
[x]     Patient not participating in therapy 
[]     Other: 
Discharge Recommendations:  [facility]. [facility] Further Equipment Recommendations for Discharge:  TBD, pending progress SUBJECTIVE:  
Patient stated I'm sorry, I thought you were my cousin.  OBJECTIVE DATA SUMMARY:  
Critical Behavior: 
Neurologic State: Confused Orientation Level: Oriented to person Cognition: Decreased command following, Poor safety awareness, Impaired decision making Safety/Judgement: Fall prevention, Lack of insight into deficits Functional Mobility Training: 
Bed Mobility: 
Rolling: Total assistance Pain: 
Pain level pre-treatment: 0/10 Pain level post-treatment: 0/10 Activity Tolerance:  
Fair Please refer to the flowsheet for vital signs taken during this treatment. After treatment:  
[] Patient left in no apparent distress sitting up in chair 
[x] Patient left in no apparent distress in bed 
[x] Call bell left within reach [x] Nursing notified 
[x] Caregiver present 
[] Bed alarm activated 
[] SCDs applied COMMUNICATION/EDUCATION:  
[x]         Role of Physical Therapy in the acute care setting. [x]         Fall prevention education was provided and the patient/caregiver indicated understanding. []         Patient/family have participated as able and agree with findings and recommendations. [x]         Patient is unable to participate in plan of care at this time. [x]         Other: Pt does not tolerate minimal activity at this time Tracy Millard, PTA Time Calculation: 10 mins

## 2020-07-13 NOTE — PROGRESS NOTES
Problem: Falls - Risk of 
Goal: *Absence of Falls Description: Document Elena Singh Fall Risk and appropriate interventions in the flowsheet. Outcome: Progressing Towards Goal 
Note: Fall Risk Interventions: 
  
 
Mentation Interventions: Adequate sleep, hydration, pain control Medication Interventions: Bed/chair exit alarm Elimination Interventions: Bed/chair exit alarm, Call light in reach History of Falls Interventions: Bed/chair exit alarm

## 2020-07-13 NOTE — PROGRESS NOTES
Problem: Diabetes Self-Management  Goal: *Disease process and treatment process  Description: Define diabetes and identify own type of diabetes; list 3 options for treating diabetes. Outcome: Progressing Towards Goal     Problem: Falls - Risk of  Goal: *Absence of Falls  Description: Document Surya Nayan Fall Risk and appropriate interventions in the flowsheet. Outcome: Progressing Towards Goal  Note: Fall Risk Interventions:       Mentation Interventions: Adequate sleep, hydration, pain control, Bed/chair exit alarm, Door open when patient unattended, More frequent rounding, Room close to nurse's station    Medication Interventions: Bed/chair exit alarm, Patient to call before getting OOB    Elimination Interventions: Bed/chair exit alarm, Call light in reach, Toileting schedule/hourly rounds    History of Falls Interventions: Bed/chair exit alarm, Door open when patient unattended         Problem: Pressure Injury - Risk of  Goal: *Prevention of pressure injury  Description: Document Ryan Scale and appropriate interventions in the flowsheet.   Outcome: Progressing Towards Goal  Note: Pressure Injury Interventions:  Sensory Interventions: Assess changes in LOC, Assess need for specialty bed, Minimize linen layers    Moisture Interventions: Absorbent underpads, Apply protective barrier, creams and emollients, Assess need for specialty bed, Minimize layers, Moisture barrier    Activity Interventions: Assess need for specialty bed, PT/OT evaluation    Mobility Interventions: Assess need for specialty bed, HOB 30 degrees or less, PT/OT evaluation    Nutrition Interventions: Document food/fluid/supplement intake    Friction and Shear Interventions: Apply protective barrier, creams and emollients, HOB 30 degrees or less, Minimize layers                Problem: Patient Education: Go to Patient Education Activity  Goal: Patient/Family Education  Outcome: Progressing Towards Goal     Problem: Nutrition Deficit  Goal: *Optimize nutritional status  Outcome: Progressing Towards Goal     Problem: Risk for Spread of Infection  Goal: Prevent transmission of infectious organism to others  Description: Prevent the transmission of infectious organisms to other patients, staff members, and visitors.   Outcome: Progressing Towards Goal

## 2020-07-13 NOTE — PROGRESS NOTES
Left message for [facility] regarding update on completion of Level 2. Level 2 needs to be completed so pt can be placed for LTC. KANDI Day, [email or web address] [phone]

## 2020-07-13 NOTE — PROGRESS NOTES
cannot visit patient at this time because of isolation. Will follow-up with patient and visit later, when/if possible. Chaplain Florentino [place] [facility] [phone]

## 2020-07-13 NOTE — ROUTINE PROCESS
Bedside and Verbal shift change report given to CATRACHO Britt RN (oncoming nurse) by KAMLESH Quinn RN (offgoing nurse). Report included the following information SBAR.

## 2020-07-13 NOTE — PROGRESS NOTES
Left a message for Maria R Berger of [facility]. KIM DominguezN RN Care Management Pager: [phone]

## 2020-07-14 NOTE — PROGRESS NOTES
Spoke with [facility]. State has until Thursday to return the final decision.  KANDI Ordoñez, [place]- [phone]

## 2020-07-14 NOTE — PROGRESS NOTES
Vida Crawley [phone] of [facility] called requesting to come to the hospital to interview pt. Se stated it will be a challenge for pt if she has to do telephone interview with pt because of pt's mental status. She stated she can com Thursday at [phone] ([place] Case Management Manager and Gerald Melvin.       Susy Sanchez, KIMN RN  Care Management  Pager: [phone]

## 2020-07-14 NOTE — ROUTINE PROCESS
Bedside and Verbal shift change report given to MILA De León (oncoming nurse) by Breanna Astudillo (offgoing nurse). Report included the following information SBAR, Kardex, Intake/Output and MAR.

## 2020-07-14 NOTE — PROGRESS NOTES
Problem: Self Care Deficits Care Plan (Adult)  Goal: *Acute Goals and Plan of Care (Insert Text)  Description: Occupational Therapy Goals  Initiated 7/8/2020 within 7 day(s). 1.  Patient will perform bed mobility with moderate assistance in preparation for further self-care. 2.  Patient will perform self-feeding with modified independence. 3.  Patient will perform grooming with supervision/set-up. 4.  Patient will perform a functional activity/ADL sitting EOB, presenting with F sitting balance. 5.  Patient will participate in upper extremity therapeutic exercise/activities with supervision/set-up for 8 minutes. Prior Level of Function: Pt reports she was (I) with self-feeding, grooming, unable to state whether she needed assist with UB/LB dressing, bathing, and toileting PTA. Pt had a cane. Per chart review, pt lived alone. Outcome: Progressing Towards Goal   OCCUPATIONAL THERAPY TREATMENT    Patient: Arnie Espinoza (47 y.o. female)  Date: 7/13/2020  Diagnosis: A-fib [facility]) [I48.91]  CVA (cerebral vascular accident) ([facility] [I63.9]  VALENTINA (acute kidney injury) ([facility] [N17.9]   <principal problem not specified>       Precautions: Fall, Skin    Chart, occupational therapy assessment, plan of care, and goals were reviewed. ASSESSMENT:  Patient drowsy at start of session but became more alert over time. She was agreeable to participate in therapy. AAROM performed with BUEs at a very slow pace. Resistance noted at times but patient encouraged to assist in ROM exercises. Intermittent cuing needed throughout session. She noted wanting a drink. Cup placed within reach and patient encouraged to grasp it herself. She used both hands and was able to drink given extra time. Washcloth given for her face and patient perseverated on washing her eyes but did follow command to perform task. Min assist provided for completion. Patient noted to be positioned with LEs externally rotated with knees up by her UEs.   She stated this was a comfortable position and declined positional change. Patient left supine in bed with HOB elevated and lunch tray over bed. Nurse Karla Rodriguez notified that patient will need assist with self feeding and he verbalized understanding but noted that patient is able to feed herself. Will continue with 3 day trial.      Progression toward goals:  []          Improving appropriately and progressing toward goals  [x]          Improving slowly and progressing toward goals  []          Not making progress toward goals and plan of care will be adjusted     PLAN:  Patient continues to benefit from skilled intervention to address the above impairments. Continue treatment per established plan of care. Discharge Recommendations:  [facility] vs [facility]  Further Equipment Recommendations for Discharge:  TBD at next level of care     SUBJECTIVE:   Patient stated I'm hungry.     OBJECTIVE DATA SUMMARY:   Cognitive/Behavioral Status:  Neurologic State: Confused, Drowsy  Orientation Level: Oriented to person, Oriented to place, Disoriented to situation, Disoriented to time  Cognition: Decreased command following  Safety/Judgement: Decreased awareness of environment, Decreased insight into deficits, Fall prevention    Functional Mobility and Transfers for ADLs:   Bed Mobility:  Rolling: Total assistance    ADL Intervention:  Feeding  Feeding Assistance: Set-up; Supervision(Constant supervision and encouragement needed)  Food to Mouth: Set-up; Supervision  Drink to Mouth: Set-up; Supervision    Grooming  Grooming Assistance: Minimum assistance  Position Performed: (Supine in bed with HOB elevated)  Washing Face: Minimum assistance(Verbal/tactile cues to complete task)  Washing Hands: Set-up; Supervision    Cognitive Retraining  Safety/Judgement: Decreased awareness of environment;Decreased insight into deficits; Fall prevention    UE Therapeutic Exercises:   Pt was able to perform BUE:    EXERCISE   Sets   Reps Active Active Assist   Passive Self- assisted ROM   Comments   Shoulder horizontal abduction     []  []  []  []      Shoulder flexion 1   10 []  [x]  []  []      Shoulder extension  1 10  []  [x]  []  []      Forearm extension/flexion 1 10 []  [x]  []  []     Shoulder external rotation     []  []  []  []      Shoulder internal rotation     []  []  []  []      Wrist flexion/extension 1   10 []  [x]  []  []      Chair pushups     []  []  []  []      To increase strength/endurance for ADLs. Pain: Patient does not note pain when prompted but yells out at times when extremities are mobilized. Pain level pre-treatment: 0/10   Pain level post-treatment: 0/10  Pain Intervention(s): Rest  Response to intervention: NA    Activity Tolerance:    Fair  Please refer to the flowsheet for vital signs taken during this treatment. After treatment:   []  Patient left in no apparent distress sitting up in chair  [x]  Patient left in no apparent distress in bed  [x]  Call bell left within reach  [x]  Nursing notified  []  Caregiver present  []  Bed alarm activated    COMMUNICATION/EDUCATION:   [x] Role of Occupational Therapy in the acute care setting  [x] Home safety education was provided and the patient/caregiver indicated understanding but patient confused. [] Patient/family have participated as able in working towards goals and plan of care. [] Patient/family agree to work toward stated goals and plan of care. [] Patient understands intent and goals of therapy, but is neutral about his/her participation. [x] Patient is unable to participate in goal setting and plan of care.       Thank you for this referral.  Caren Escalera MS OTR/L   Time Calculation: 23 mins

## 2020-07-14 NOTE — PROGRESS NOTES
ARU/IPR REFERRAL CONTACT NOTE  [address] [place] [address] for Physical Rehabilitation    RE: Mauro Morales     Thank you for the opportunity to review this patient's case for admission to [address] [place] [address] for Physical Rehabilitation. Based on our pre-admission screening:     [x ] This patient does not meet criteria for admission to [facility] for Physical Rehabilitation due to:    [x ] Too low level, per documentation, patient has not demonstrated tolerance for acute rehabilitation level of intensity. [x ] We recommend the following:  [x ] LTC    Again, Thank you for this referral. Should you have any questions please do not hesitate to call. Sincerely,  Shoshana Jimenez. Tommy Tenorio, [address]   Tommy Tenorio, RN  Admissions [facility] for Physical Rehabilitation  [phone]

## 2020-07-14 NOTE — PROGRESS NOTES
[facility]  [facility]    Patient: Seda Yates Age: 71 y.o. : 1955 MR#: [id number] SSN: [id number]  Date/Time: 2020     Subjective: Patient alert awake, was able to make better conversation today. Still confused but calm and cooperative. Denies any complaints. Assessment/Plan:     1. Acute on chronic encephalopathy:  Superimposed with chronic cerebrovascular disease, patient almost baseline per family. 2. No acute CVA per neuro: admitted because she was found on the floor by family. Continue aspirin Lipitor beta-blocker, patient could not tolerate MRI, repeat CT negative for acute stroke. 3. Paroxysmal atrial fibrillation: Continue Coreg, Eliquis. Cardiology signed off.  4. Indeterminate troponins: Not consistent with ACS, cardiology no further work-up. 5. Cardiomyopathy ejection fraction of 25%: Well compensated, continue Eliquis, beta-blocker and statin. No ACE inhibitor due to low normal blood pressure. 6. VALENTINA on CKD stage II: Creatinine back to baseline. 7. Multiple skin lesions and areas of pressure injury, all POA: Please refer to wound care consult note for detailed description of location of each of the 10+ wounds. S several of the skin lesions are possibly indicative of vitamin deficiencies. Some mild erythema redness noted but no signs of active infection. 8. Right thigh mild swelling with ecchymosis: No change in last 3 days, do not suspect active bleeding. 9. Hypothyroidism: Continue Synthroid. 10. Hypertension: BP stable, continue Coreg  11. Diabetes mellitus type 2:  BS stable, continue SSI  12. Mental retardation with noncompliance of medication and medical regimen. Continue supportive care. 12.  Full code  PPD testing pending, discussed with RN to follow final read today. Discussed with the patient at bedside  Discussed with case management, awaiting placement and pending level 2.         Case discussed with: [x]Patient  []Family  [x]Nursing  []Case Management  DVT Prophylaxis:   Eliquis[x][]Hep SQ  []SCDs  []Coumadin   []Eliquis/Xarelto     Objective:   VS:   Visit Vitals  /74 (BP 1 Location: Left arm, BP Patient Position: Supine)   Pulse (!) 112   Temp 98.1 °F (36.7 °C)   Resp 20   Ht 5' (1.524 m)   Wt 57.9 kg (127 lb 10.3 oz)   SpO2 99%   BMI 24.93 kg/m²      Tmax/24hrs: Temp (24hrs), Av.8 °F (36.6 °C), Min:97.4 °F (36.3 °C), Max:98.1 °F (36.7 °C)  IOBRIEF    Intake/Output Summary (Last 24 hours) at 2020 1722  Last data filed at 2020 0930  Gross per 24 hour   Intake 400 ml   Output    Net 400 ml       General:  Alert, cooperative, no acute distress, confused    Pulmonary:  CTA Bilaterally. No Wheezing/Rales. Cardiovascular: Regular rate and Rhythm. GI:  Soft, Non distended, Non tender. + Bowel sounds. Neurologic: Alert and oriented X 1. Moves all extremities but limited in lower extremity,? Right lower extremity contracted at the hip joint and abducted with open pelvis, left leg patient has straightened up today. Additional: Right inner thigh mild swelling with ecchymosis, multiple erythematous patches with eschar and excoriated skin with ulcers with some redness noted in bilateral lower extremity.     Medications:   Current Facility-Administered Medications   Medication Dose Route Frequency    carvediloL (COREG) tablet 6.25 mg  6.25 mg Oral BID WITH MEALS    apixaban (ELIQUIS) tablet 2.5 mg  2.5 mg Oral BID    thiamine HCL (B-1) tablet 100 mg  100 mg Oral DAILY    ferrous sulfate tablet 325 mg  1 Tab Oral DAILY WITH BREAKFAST    cholecalciferol (VITAMIN D3) capsule 5,000 Units  5,000 Units Oral DAILY    ascorbic acid (vitamin C) (VITAMIN C) tablet 1,000 mg  1,000 mg Oral BID    levothyroxine (SYNTHROID) tablet 100 mcg  100 mcg Oral 6am    ondansetron (ZOFRAN) injection 4 mg  4 mg IntraVENous Q6H PRN    atorvastatin (LIPITOR) tablet 80 mg  80 mg Oral QHS    acetaminophen (TYLENOL) tablet 650 mg  650 mg Oral Q4H PRN    labetaloL (NORMODYNE;TRANDATE) 20 mg/4 mL (5 mg/mL) injection 5 mg  5 mg IntraVENous Q10MIN PRN    senna-docusate (PERICOLACE) 8.6-50 mg per tablet 2 Tab  2 Tab Oral QHS    insulin lispro (HUMALOG) injection   SubCUTAneous TIDAC    glucose chewable tablet 16 g  4 Tab Oral PRN    glucagon (GLUCAGEN) injection 1 mg  1 mg IntraMUSCular PRN    dextrose (D50W) injection syrg 12.5-25 g  25-50 mL IntraVENous PRN    erythromycin (ILOTYCIN) 5 mg/gram (0.5 %) ophthalmic ointment   Both Eyes Q4H    famotidine (PEPCID) tablet 20 mg  20 mg Oral DAILY    vits A and D-white pet-lanolin (A&D) ointment   Topical BID    LORazepam (ATIVAN) injection 1 mg  1 mg IntraVENous Q2H PRN    sodium chloride (NS) flush 5-10 mL  5-10 mL IntraVENous PRN       Labs:    Recent Results (from the past 24 hour(s))   GLUCOSE, POC    Collection Time: 07/13/20  9:14 PM   Result Value Ref Range    Glucose (POC) 156 (H) 70 - 110 mg/dL   GLUCOSE, POC    Collection Time: 07/14/20  6:28 AM   Result Value Ref Range    Glucose (POC) 126 (H) 70 - 110 mg/dL   GLUCOSE, POC    Collection Time: 07/14/20 11:29 AM   Result Value Ref Range    Glucose (POC) 337 (H) 70 - 110 mg/dL   GLUCOSE, POC    Collection Time: 07/14/20  4:23 PM   Result Value Ref Range    Glucose (POC) 221 (H) 70 - 110 mg/dL       Signed By: Chantel Chavez MD     July 14, 2020      Disclaimer: Sections of this note are dictated using utilizing voice recognition software. Minor typographical errors may be present. If questions arise, please do not hesitate to contact me or call our department.

## 2020-07-14 NOTE — PROGRESS NOTES
NUTRITION    Nursing Referral: Pressure Injury  Nutrition Consult: General Nutrition Management & Supplements     RECOMMENDATIONS / PLAN:     - Continue current nutrition interventions   - Monitor/ encourage po intake. Assistance with meals as needed  - Continue RD inpatient monitoring and evaluation. NUTRITION INTERVENTIONS & DIAGNOSIS:     - Meals/snacks: modified composition  - Medical food supplement therapy: continue   - Collaboration and referral of nutrition care: pt discussed with RN    Nutrition Diagnosis: Increased nutrient needs (protein) related to promotion of wound healing as evidenced by pt with pressure injury wound. Unintended weight loss related to predicated prolonged inadequate energy intake as evidenced by wt loss of 12.1% x 3 year and 9 month PTA per chart hx    ASSESSMENT:     7/14: Pt with good appetite and meal intake per RN; consuming >50% of meals per chart documentation and report. Likes/consuming nutrition supplements. Tolerating diet    7/9: Pt reported good appetite; has good meal intake per RN. Tolerating diet. Food preferences provided by MD. Oc Huynh with patient's sister; stated pt usually eats 2-3 meals daily + drink nutrition shake sometimes. Was tolerating regular consistency food at home PTA. Discussed current diet consistency due to sister asking about providing food for pt. Limited NFPE conducted due to pt contracted; moderate fat loss in triceps; moderate muscle loss in temples and clavicles  7/8: Noted pt with intellectual deficit, poor historian per H&P; unable to physically see pt today, pt unavailable at times of visit. Admitted for CVA; has impaired renal function. Has skin breakdown. Wt loss in past several years per chart hx. On dysphagia diet per SLP. Noted plan per MD to check vitamins C and D, phos, Mg, and thiamine levels.     Nutritional intake adequate to meet patients estimated nutritional needs:  Yes    Diet: DIET DYSPHAGIA PUREED (NDD1)  DIET NUTRITIONAL SUPPLEMENTS Breakfast, Dinner; Glucerna Shake      Food Allergies:  None known   Current Appetite: Good per RN  Appetite/meal intake prior to admission: good per sister report. Pt usually eats 2-3 meals daily, sometimes consuming nutrition shake. On Monday, Wednesdays and Fridays, pt received lunch and dinner from [employer]  Feeding Limitations:  [x] Swallowing difficulty: SLP following     [] Chewing difficulty    [] Other:  Current Meal Intake:   Patient Vitals for the past 100 hrs:   % Diet Eaten   07/12/20 1835 50 %   07/12/20 1305 50 %   07/11/20 1829 50 %       BM: 7/11  Skin Integrity: unstageable sacral pressure injury; moisture associated skin damage on right & left lower legs;  Callous/corns on right and left elbows;  Edematous on perineum anterior labia; Moisture associated skin damage on right and left eyes; Right and left hip wounds/ blanchable redness  Edema:   [] No     [x] Yes   Pertinent Medications: Reviewed: ascorbic acid, atorvastatin, cholecalciferol, erythromycin, famotidine, ferrous sulfate, SSI, levothyroxine, ondansetron, pericolace, thiamine     Recent Labs     07/12/20  0829      K 4.3   *   CO2 25   *   BUN 41*   CREA 1.31*   CA 8.5   MG 2.0   PHOS 2.3*     No intake or output data in the 24 hours ending 07/14/20 1302    Anthropometrics:  Ht Readings from Last 1 Encounters:   07/08/20 5' (1.524 m)     Last 3 Recorded Weights in this Encounter    07/09/20 2219 07/12/20 0400 07/13/20 2125   Weight: 53.3 kg (117 lb 8.1 oz) 56.2 kg (123 lb 14.4 oz) 57.9 kg (127 lb 10.3 oz)     Body mass index is 24.93 kg/m². Weight History:  -15 lb, 12.1% x 3 year and 9 month PTA per chart hx.   Per sister report, pt lost weight about 1- 1.5 years ago, then wt has remained stable since then    Weight Metrics 7/13/2020 9/28/2016 9/6/2016 6/2/2016 4/18/2016 1/19/2016 1/19/2016   Weight 127 lb 10.3 oz 124 lb 121 lb 8 oz 128 lb 128 lb 3.2 oz 123 lb 9.6 oz 123 lb 9.6 oz   BMI 24.93 kg/m2 20.63 kg/m2 20.22 kg/m2 21.3 kg/m2 21.33 kg/m2 20.57 kg/m2 20.57 kg/m2        Admitting Diagnosis: A-fib (HCC) [I48.91]  CVA (cerebral vascular accident) ([facility] [I63.9]  VALENTINA (acute kidney injury) ([facility] [N17.9]  Pertinent PMHx: CKD, CHF, DM, HTN, hypothyroidism, iron deficiency anemia    Education Needs:        [x] None identified  [] Identified - Not appropriate at this time  []  Identified and addressed - refer to education log  Learning Limitations:   [] None identified  [x] Identified: intellectual deficit, conjunctivitis of both eyes   Cultural, Hoahaoism & ethnic food preferences:  [x] None identified    [] Identified and addressed     ESTIMATED NUTRITION NEEDS:     Calories: 4550-8585 kcal (30-35 kcal/kg) based on  [x] Actual BW: 49 kg      [] IBW   Protein: 62-74 gm (1.25-1.5 gm/kg) based on  [x] Actual BW      [] IBW   Fluid: 1 mL/kcal     MONITORING & EVALUATION:     Nutrition Goal(s):   - PO nutrition intake will meet >75% of patient estimated nutritional needs within the next 7 days. Outcome: Met/Continue   - PO nutrition intake will continue to meet >75% of patients estimated nutritional needs over the next 7 days. Outcome:   New/initial goal    Monitoring:   [x] Food and nutrient intake   [x] Food and nutrient administration  [x] Comparative standards   [x] Nutrition-focused physical findings   [x] Anthropometric Measurements   [x] Treatment/therapy   [x] Biochemical data, medical tests, and procedures        Previous Recommendations (for follow-up assessments only): Implemented      Discharge Planning: No nutritional discharge needs at this time. Participated in care planning, discharge planning, & interdisciplinary rounds as appropriate.       Shayla Sanchez RD  Pager: [phone]

## 2020-07-14 NOTE — PROGRESS NOTES
Problem: Dysphagia (Adult)  Goal: *Acute Goals and Plan of Care (Insert Text)  Description: Patient will:  1. Tolerate PO trials with 0 s/s overt distress in 4/5 trials-met  2. Utilize compensatory swallow strategies/maneuvers (decrease bite/sip, size/rate, alt. liq/sol) with min cues in 4/5 trials-met    Recommend:   Puree, thin liquids  Meds in puree  May require assistance with intake   Aspiration precautions  HOB >45 degrees during all intake and for at least 30 min after po   Small bites/sips, slow rate of intake, alternating bites/sips  Oral care three times daily      Outcome: Resolved/Met    SPEECH LANGUAGE PATHOLOGY DYSPHAGIA TREATMENT & DISCHARGE    Patient: Topher Galvan (48 y.o. female)  Date: 7/14/2020  Diagnosis: A-fib ([facility] [I48.91]  CVA (cerebral vascular accident) ([facility] [I63.9]  VALENTINA (acute kidney injury) ([facility] [N17.9]   Precautions: Aspiration, Fall, Skin  PLOF: As per H&P     ASSESSMENT:  Pt seen for follow up dysphagia treatment. Per RN, pt consumed 100% of breakfast meal.  Pt continuing to tolerate puree and thin liquids with no overt s/sx aspiration. Demo [facility] presentations requiring multiple facilitated liquid washes to clear. Recommend continue current diet with aspiration precautions in place. Pt currently tolerating safest, least restrictive diet. No further skilled SLP services indicated at this time. Will sign off. Progression toward goals:  Goals met/approximated      PLAN:  Recommendations and Planned Interventions:  Maximum therapeutic gains met; safest, least restrictive diet achieved. D/C ST intervention at this time. Discharge Recommendations: To Be Determined     SUBJECTIVE:   Patient stated That's good.     OBJECTIVE:   Cognitive and Communication Status:  Neurologic State: Confused  Orientation Level: Oriented to person  Cognition: Decreased attention/concentration  Perception: (unable to determine)  Perseveration: Perseverates during ADLS, Perseverates during conversation  Safety/Judgement: Decreased awareness of environment, Decreased insight into deficits, Fall prevention  Dysphagia Treatment:  Oral Assessment:  Oral Assessment  Labial: Decreased rate  Dentition: Natural, Limited  Oral Hygiene: fair  Lingual: Decreased rate, Decreased strength, Incoordinated  Velum: No impairment  Mandible: No impairment  P.O. Trials:   Patient Position: HOB 55*   Vocal quality prior to P.O.: No impairment   Consistency Presented: Thin liquid, Puree, Mechanical soft   How Presented: Self-fed/presented, SLP-fed/presented, Spoon, Straw, Successive swallows   Bolus Acceptance: No impairment   Bolus Formation/Control: Impaired   Type of Impairment: Delayed, Mastication, Lip closure, Poor   Propulsion: Delayed (# of seconds)   Oral Residue: None   Initiation of Swallow: No impairment   Laryngeal Elevation: Functional   Aspiration Signs/Symptoms: None   Pharyngeal Phase Characteristics: Audible swallow   Effective Modifications: Alternate liquids/solids, Small sips and bites   Cues for Modifications:  Moderate   Oral Phase Severity: Moderate   Pharyngeal Phase Severity : No impairment     PAIN:  Start of Tx: 0  End of Tx: 0     After treatment:   []              Patient left in no apparent distress sitting up in chair  [x]              Patient left in no apparent distress in bed  [x]              Call bell left within reach  [x]              Nursing notified  []              Caregiver present  []              Bed alarm activated      COMMUNICATION/EDUCATION:   [x] Aspiration precautions; swallow safety; compensatory techniques  []        Patient/family able to participate in training and education   [x]  Patient unable to participate in education; education ongoing with staff  [] Patient understands goals/intent of therapy; neutral about participation     Thank you for this referral,  Francois Castellano M.S., [id number] [facility]  Speech-Language Pathologist

## 2020-07-14 NOTE — PROGRESS NOTES
Patient unable to understand and/or complete the \"Important Message from [facility]". Reviewed document with patient's representative Jose Guadalupe Bryant at phone number [phone] telephonically and addressed questions. A copy of the IMM letter left at bedside with patient. Original, with verbal signature noted, placed in patient's chart.

## 2020-07-15 NOTE — PROGRESS NOTES
[facility]  Progress Note    Patient: Nellie Brennan Age: 71 y.o. : 1955 MR#: [id number] SSN: [id number]  Date/Time: 7/15/2020     Subjective: Patient alert awake, was able to make small conversation. Still confused but calm and cooperative. Denies any complaints. Assessment/Plan:     1. Acute on chronic encephalopathy:  Superimposed with chronic cerebrovascular disease, patient almost baseline per family. 2. No acute CVA per neuro: admitted because she was found on the floor by family. Continue aspirin Lipitor beta-blocker, patient could not tolerate MRI, repeat CT negative for acute stroke. 3. Paroxysmal atrial fibrillation: Continue Coreg, Eliquis. Cardiology signed off.  4. Indeterminate troponins: Not consistent with ACS, cardiology no further work-up. 5. Cardiomyopathy ejection fraction of 25%: Well compensated, continue Eliquis, beta-blocker and statin. No ACE inhibitor due to low normal blood pressure. 6. VALENTINA on CKD stage II: Creatinine back to baseline. 7. Multiple skin lesions and areas of pressure injury, all POA: Please refer to wound care consult note for detailed description of location of each of the 10+ wounds. S several of the skin lesions are possibly indicative of vitamin deficiencies. Some mild erythema redness noted but no signs of active infection. 8. Right thigh mild swelling with ecchymosis: No change in last 3 days, do not suspect active bleeding. 9. Hypothyroidism: Continue Synthroid. 10. Hypertension: BP stable, continue Coreg  11. Diabetes mellitus type 2:  BS slightly elevated, start low-dose of Lantus and continue SSI  12. Mental retardation with noncompliance of medication and medical regimen. Continue supportive care. 12.  Full code  PPD testing 24 and 48 hrs negative. Discussed with the patient at bedside  Discussed with case management, awaiting placement and pending level 2.   Discussed with Sister Can Starling over the phone and updated current information. Case discussed with:  [x]Patient  []Family  [x]Nursing  []Case Management  DVT Prophylaxis:   Eliquis[x][]Hep SQ  []SCDs  []Coumadin   []Eliquis/Xarelto     Objective:   VS:   Visit Vitals  /88 (BP 1 Location: Left arm, BP Patient Position: Supine)   Pulse 100   Temp 98 °F (36.7 °C)   Resp 18   Ht 5' (1.524 m)   Wt 58.4 kg (128 lb 12 oz)   SpO2 97%   BMI 25.14 kg/m²      Tmax/24hrs: Temp (24hrs), Av.9 °F (36.6 °C), Min:97.7 °F (36.5 °C), Max:98.1 °F (36.7 °C)  IOBRIEF    Intake/Output Summary (Last 24 hours) at 7/15/2020 1514  Last data filed at 2020 1630  Gross per 24 hour   Intake 250 ml   Output    Net 250 ml       General:  Alert, cooperative, no acute distress, confused    Pulmonary:  CTA Bilaterally. No Wheezing/Rales. Cardiovascular: Regular rate and Rhythm. GI:  Soft, Non distended, Non tender. + Bowel sounds. Neurologic: Alert and oriented X 1. Moves all extremities but limited in lower extremity,? Right lower extremity contracted at the hip joint and abducted with open pelvis, left leg patient has straightened up today. Additional: Right inner thigh mild swelling with ecchymosis, multiple erythematous patches with eschar and excoriated skin with ulcers with some redness noted in bilateral lower extremity.     Medications:   Current Facility-Administered Medications   Medication Dose Route Frequency    dextrose 10% infusion 125-250 mL  125-250 mL IntraVENous PRN    insulin glargine (LANTUS) injection 5 Units  5 Units SubCUTAneous DAILY    carvediloL (COREG) tablet 6.25 mg  6.25 mg Oral BID WITH MEALS    apixaban (ELIQUIS) tablet 2.5 mg  2.5 mg Oral BID    thiamine HCL (B-1) tablet 100 mg  100 mg Oral DAILY    ferrous sulfate tablet 325 mg  1 Tab Oral DAILY WITH BREAKFAST    cholecalciferol (VITAMIN D3) capsule 5,000 Units  5,000 Units Oral DAILY    ascorbic acid (vitamin C) (VITAMIN C) tablet 1,000 mg  1,000 mg Oral BID    levothyroxine (SYNTHROID) tablet 100 mcg  100 mcg Oral 6am    ondansetron (ZOFRAN) injection 4 mg  4 mg IntraVENous Q6H PRN    atorvastatin (LIPITOR) tablet 80 mg  80 mg Oral QHS    acetaminophen (TYLENOL) tablet 650 mg  650 mg Oral Q4H PRN    labetaloL (NORMODYNE;TRANDATE) 20 mg/4 mL (5 mg/mL) injection 5 mg  5 mg IntraVENous Q10MIN PRN    senna-docusate (PERICOLACE) 8.6-50 mg per tablet 2 Tab  2 Tab Oral QHS    insulin lispro (HUMALOG) injection   SubCUTAneous TIDAC    glucose chewable tablet 16 g  4 Tab Oral PRN    glucagon (GLUCAGEN) injection 1 mg  1 mg IntraMUSCular PRN    erythromycin (ILOTYCIN) 5 mg/gram (0.5 %) ophthalmic ointment   Both Eyes Q4H    famotidine (PEPCID) tablet 20 mg  20 mg Oral DAILY    vits A and D-white pet-lanolin (A&D) ointment   Topical BID    LORazepam (ATIVAN) injection 1 mg  1 mg IntraVENous Q2H PRN    sodium chloride (NS) flush 5-10 mL  5-10 mL IntraVENous PRN       Labs:    Recent Results (from the past 24 hour(s))   GLUCOSE, POC    Collection Time: 07/14/20  4:23 PM   Result Value Ref Range    Glucose (POC) 221 (H) 70 - 110 mg/dL   GLUCOSE, POC    Collection Time: 07/14/20  9:42 PM   Result Value Ref Range    Glucose (POC) 196 (H) 70 - 110 mg/dL   GLUCOSE, POC    Collection Time: 07/15/20  7:04 AM   Result Value Ref Range    Glucose (POC) 176 (H) 70 - 110 mg/dL   GLUCOSE, POC    Collection Time: 07/15/20 11:47 AM   Result Value Ref Range    Glucose (POC) 208 (H) 70 - 110 mg/dL       Signed By: Yusra Lazcano MD     July 15, 2020      Disclaimer: Sections of this note are dictated using utilizing voice recognition software. Minor typographical errors may be present. If questions arise, please do not hesitate to contact me or call our department.

## 2020-07-15 NOTE — PROGRESS NOTES
Received information back from [facility]. Pt appropriate for LTC. Updated LTSS/UAI. Updated CM, Bárbara Euceda.  Fady Nuñez, MSW, [facility]- [phone]

## 2020-07-15 NOTE — ADT AUTH CERT NOTES
Pending covid by Barbara Rose RN         Review Status  Review Entered    In Primary  7/13/2020 14:26        Criteria Review    Is the illness suspected to be related to the Coronavirus (COVID-19)? placement  Has the member been tested for the COVID-19? Yes   If Yes, what are the results of the COVID-19? Pending  What is the severity of the members condition (i.e. Isolation, Ventilator use)? Droplet plus isolation          Stroke: Ischemic - Care Day 5 (7/12/2020) by Barbara Rose RN         Review Status  Review Entered    Completed  7/13/2020 14:20        Criteria Review       Care Day: 5 Care Date: 7/12/2020 Level of Care: Telemetry    Guideline Day 2    Level Of Care    (X) Stroke unit, ICU, telemetry, or floor    7/13/2020 14:20:38 EDT by Barbara Rose      TELE BED. Clinical Status    (X) * Hemodynamic stability    7/13/2020 14:20:38 EDT by Brabara Rose      97.3 AX P 72 RR 19 /82 SPO2 94% ROOM AIR.    (X) * Mental status at baseline or stable    7/13/2020 14:20:38 EDT by Jesse Duran 64 and oriented X 1.    (X) * Neurologic deficits absent or stable    7/13/2020 14:20:38 EDT by Liza Lowry. (X) * Unimpaired swallowing    7/13/2020 14:20:38 EDT by Cecilia Maynard SLP FOLLOWING.    ( ) * Tolerates sitting in chair    (X) * Feeding with or without assistance    7/13/2020 14:20:38 EDT by Barbara Rose      WITHOUT. Activity    ( ) * Up to chair    ( ) Possible assisted ambulation    7/13/2020 14:20:38 EDT by Terri Kim PT CONSULTED. Routes    (X) * Oral hydration, medications    7/13/2020 14:20:38 EDT by Barbara Rose      LIPITOR 80MG PO QHS. COREG 6.25MG PO BID. SYNTHROID 100MCG PO QAM.    (X) * Advance diet as tolerated    7/13/2020 14:20:38 EDT by Justin WHATLEY. NUTRITIONAL SUPPLEMENTS WITH BF AND DINNER. Interventions    (X) Neurologic checks    7/13/2020 14:20:38 EDT by Lamond Oar      CONTINUOUS N/V CHECKS.     (X) Glucose control 7/13/2020 14:20:38 EDT by Delbert Penn      GLUCOSE MANAGEMENT PER PROTOCOL. LISPRO SSI SC TID AC. (X) Possible physical therapy    7/13/2020 14:20:38 EDT by Alex Hurst CONSULTED. (X) Possible occupational and speech therapy    7/13/2020 14:20:38 EDT by Delbert Penn      OT FOLLOWING.  SLP>Rec: continue puree solids, thin liquids, aspiration precautions, feeder. Medications    (X) Antithrombotic therapy    7/13/2020 14:20:38 EDT by Delbert Penn      ELIQUIS 2.5MG PO BID. * Milestone    Additional Notes    7/12/20       . GLUOCSE 143. BUN 41. CREAT 1.31. PHOS 2.3. HEAD CT 7/9>    1. No acute intracranial hemorrhage, mass effect or midline structural shift. 2. Old right MCA infarct with associated encephalomalacia. Unchanged moderate burden of small vessel disease. EMYCIN OPHTHALMIC OINTMENT Q4H TO BOTH EYES.    ATIVAN 1MG IV Q2H PRN (2 DOSES). LASIX 20MG IV X 1.       READ AND DOCUMENT PPD PER PROTOCOL. FULL CODE. RANKIN CATH CARE. DROPLET PLUS ISOLATION. CONTINUOUS I+O AND VS.    QD MOBILITY CONSULT. Subjective: Patient alert awake, confused but calm and cooperative.  Denies any complaints.  Tolerating p.o. well with assistance.         Assessment/Plan:         1. Acute on chronic encephalopathy:  Superimposed with chronic cerebrovascular disease, patient almost baseline per family. 2. No acute CVA per neuro: admitted because she was found on the floor by family. Continue aspirin Lipitor beta-blocker, patient could not tolerate MRI, repeat CT negative for acute stroke. 3. Paroxysmal atrial fibrillation: Continue Coreg, Eliquis.  Cardiology signed off.    4. Indeterminate troponins: Not consistent with ACS, cardiology no further work-up. 5. Cardiomyopathy ejection fraction of 25%: Well compensated, continue Eliquis, beta-blocker and statin.  No ACE inhibitor due to low normal blood pressure. 6. VALENTINA on CKD stage II: Creatinine back to baseline. 7. Multiple skin lesions and areas of pressure injury, all POA: Please refer to wound care consult note for detailed description of location of each of the 10+ wounds.  S several of the skin lesions are possibly indicative of vitamin deficiencies.  Some mild erythema redness noted but no signs of active infection. 8. Hypothyroidism: Continue Synthroid. 9. Hypertension: BP stable, continue Coreg    10. Diabetes mellitus type 2:  BS stable, continue SSI    11. Mental retardation with noncompliance of medication and medical regimen.  Continue supportive care.

## 2020-07-15 NOTE — PROGRESS NOTES
Problem: Self Care Deficits Care Plan (Adult)  Goal: *Acute Goals and Plan of Care (Insert Text)  Description: Occupational Therapy Goals  Initiated 7/8/2020, Re-evaluated 7/15/2020 within 7 day(s). Continue all goals    1. Patient will perform bed mobility with moderate assistance in preparation for further self-care. 2.  Patient will perform self-feeding with modified independence. 3.  Patient will perform grooming with supervision/set-up. 4.  Patient will perform a functional activity/ADL sitting EOB, presenting with F sitting balance. 5.  Patient will participate in upper extremity therapeutic exercise/activities with supervision/set-up for 8 minutes. Prior Level of Function: Pt reports she was (I) with self-feeding, grooming, unable to state whether she needed assist with UB/LB dressing, bathing, and toileting PTA. Pt had a cane. Per chart review, pt lived alone. Outcome: Progressing Towards Goal     OCCUPATIONAL THERAPY RE-EVALUATION    Patient: Filipe Alvares (64 y.o. female)  Date: 7/15/2020  Primary Diagnosis: A-fib ([facility] [I48.91]  CVA (cerebral vascular accident) ([facility] [address] [I63.9]  VALENTINA (acute kidney injury) ([facility] [address] [N17.9]  Precautions: Fall, Aspiration, Skin    ASSESSMENT :  Upon entering the room, the patient was in bed with HOB at 39 degrees, alert, and attempting to eat breakfast. Patient agreeable to this OT re-positioning her and agreeable to participate in OT re-evaluation. Patient oriented to person and place only and presents with confusion throughout session. Patient with Aspiration precautions on whiteboard and door, HOB elevated > 50 degrees for self-feeding. Patient needed assist for cutting food and stand by assist for all other feeding tasks this session. Patient required moderate verbal cues for pace and for taking small bites/sips. Patient demonstrated good carryover during feeding this session.  Patient finished all of her eggs, sausage, and orange juice and was stand by assist for washing her hands this session using saniwipe and washcloth for thoroughness. Patient max assist for supine to longsit and demos poor sitting balance. Patient left in bed HOB 53% and all needs met. Based on the objective data described below, the patient presents with decreased strength, decreased independence, decreased safety awareness, decreased functional balance, and decreased functional mobility, which impedes pt performance in basic self-care/ADL tasks. Patient would benefit from skilled OT to restore PLOF and maximize function. Patient will benefit from skilled intervention to address the above impairments. Patient's rehabilitation potential is considered to be Fair  Factors which may influence rehabilitation potential include:   []             None noted  [x]             Mental ability/status  [x]             Medical condition  [x]             Home/family situation and support systems  [x]             Safety awareness  []             Pain tolerance/management  []             Other:      PLAN :  Recommendations and Planned Interventions:   [x]               Self Care Training                  [x]      Therapeutic Activities  [x]               Functional Mobility Training   [x]      Cognitive Retraining  [x]               Therapeutic Exercises           [x]      Endurance Activities  [x]               Balance Training                    [x]      Neuromuscular Re-Education  []               Visual/Perceptual Training     [x]      Home Safety Training  [x]               Patient Education                   [x]      Family Training/Education  []               Other (comment):    Frequency/Duration: Patient will be followed by occupational therapy 1-2 times per day/4-7 days per week to address goals.   Discharge Recommendations: [facility]ena vs. [facility]  Further Equipment Recommendations for Discharge: TBD at next level of care     SUBJECTIVE:   Patient stated Sabine aggarwal for helping me    OBJECTIVE DATA SUMMARY:   Hospital course since last seen and reason for reevaluation: Patient has been progressing slowly on 3 day trial while receiving skilled OT services. OT will continue to follow the patient for further intervention during this hospitalization, in order to maximize ADL performance and overall functional independence. Past Medical History:   Diagnosis Date    Cardiac echocardiogram 02/09/2015    EF 64%. No WMA. Mild LVH. Gr 2 DDfx. RVSP 30 mmHg. Mod-severe LAE. No significant valvular heart disease. Cardiac nuclear imaging test, abnormal 02/09/2015    LV apical defect likely c/w prior infarction w/sm area of distal anterior romulo-infarct ischemia. EF 57%. Apical WMA, likely from prior infarct. Nondiagnostic EKG on pharm stress test.    Chronic kidney disease, stage III (moderate) (HCC) 2/6/2012    Chronic renal disease, stage III (HCC)     Congestive heart failure ([address] 2/6/2012    Congestive heart failure, unspecified 01/2012    Echocardiogram with severe global hypokinesis and EF 30% consistent with dilated cardiomyopathy. DDD (degenerative disc disease), lumbar 9/10/2016    Diabetes mellitus ([address] 01/2015    Presented with blood sugar of 637 and HbA1c 13.4    Diabetes mellitus with kidney complication ([address] 5/8/5066    Dilated cardiomyopathy ([address] 2/6/2012    Hypertension     Hypothyroidism     Iron deficiency anemia 01/2015    Presented with Hb 5.1/ Hct 18.0. On Xarelto. No obvious source of bleeding. Did not follow-up for outpatient GI evaluation. Low back pain 01/21/2015    Abdominal CT scan with thoracolumbar rotoscoliosis with severe multilevel degenerative disc disease and facet arthropathy; central canal stenosis at L4-L5. Mental retardation 2/6/2012    Paroxysmal atrial fibrillation ([address] 01/2012    Presented with atrial fibrillation with RVR; converted spontaneously to sinus rhythm during hospitalization and subsequently started on amiodarone and Xarelto.    History reviewed. No pertinent surgical history. Barriers to Learning/Limitations: yes;  altered mental status (i.e.Sedation, Confusion)  Compensate with: visual, verbal, tactile, kinesthetic cues/model    Home Situation:   Home Situation  Home Environment: Private residence  # Steps to Enter: 1  [x]  Right hand dominant   []  Left hand dominant    Cognitive/Behavioral Status:  Neurologic State: Alert;Confused  Orientation Level: Oriented to person;Oriented to place; Disoriented to time;Disoriented to situation(pt able to state [place])  Cognition: Follows commands;Decreased attention/concentration;Poor safety awareness  Safety/Judgement: Fall prevention    Skin: Intact  Edema: None noted    Vision/Perceptual:    Tracking: Able to track stimulus in all quadrants w/o difficulty    Acuity: (able to read numbers on badge without difficulty)      Coordination: BUE  Fine Motor Skills-Upper: Left Intact; Right Intact    Gross Motor Skills-Upper: Left Intact; Right Intact    Balance:  Sitting: Impaired; With support  Sitting - Static: Poor (constant support)    Strength: BUE  Strength: Generally decreased, functional      Tone & Sensation: BUE  Tone: Normal  Sensation: Intact    Range of Motion: BUE  AROM: Within functional limits      Functional Mobility and Transfers for ADLs:  Bed Mobility:  Supine to Sit: Moderate assistance;Maximum assistance(longsit)      ADL Assessment:   Feeding: Setup;Stand-by assistance    Oral Facial Hygiene/Grooming: Setup;Stand-by assistance    Bathing: Maximum assistance    Upper Body Dressing: Moderate assistance    Lower Body Dressing: Maximum assistance    Toileting: Maximum assistance    ADL Intervention:  Feeding  Feeding Assistance: Set-up; Stand-by assistance  Container Management: Standing-by assistance  Cutting Food: Minimum assistance  Utensil Management: Stand-by assistance  Food to Mouth: Stand-by assistance  Drink to Mouth: Stand-by assistance  Cues: Visual cues provided;Verbal cues provided    Grooming  Grooming Assistance: Set-up; Stand-by assistance  Position Performed: (supine in bed, HOB elevated)  Washing Face: Set-up; Stand-by assistance  Washing Hands: Stand-by assistance(using saniwipes and hand cloth)  Cues: Verbal cues provided;Visual cues provided    Cognitive Retraining  Safety/Judgement: Fall prevention    Pain:  Pain level pre-treatment: -/10   Pain level post-treatment: -/10  Pain Intervention(s): Medication (see MAR); Response to intervention: Nurse notified, See doc flow    Activity Tolerance:   Fair+    Please refer to the flowsheet for vital signs taken during this treatment. After treatment:   [] Patient left in no apparent distress sitting up in chair  [x] Patient left in no apparent distress in bed  [x] Call bell left within reach  [x] Nursing notified  [] Caregiver present  [x] Bed alarm activated    COMMUNICATION/EDUCATION:   [x] Role of Occupational Therapy in the acute care setting  [x] Home safety education was provided and the patient/caregiver indicated understanding. [x] Patient/family have participated as able in goal setting and plan of care. [x] Patient/family agree to work toward stated goals and plan of care. [] Patient understands intent and goals of therapy, but is neutral about his/her participation. [] Patient is unable to participate in goal setting and plan of care.     Thank you for this referral.  Xavier Harden OTR/L  Time Calculation: 23 mins

## 2020-07-15 NOTE — PROGRESS NOTES
Updated clinicals sent to [facility] and rehab.       KIM FloresN RN  Care Management  Pager: [phone]

## 2020-07-15 NOTE — DIABETES MGMT
GLYCEMIA CONTROL:    A1c of 8.6% (7/08/2020)    Home diabetes med: Janumet  mg BIDFwith meal    Pending assessment of home diabetes management and educ needs. Elevated BG values. Total daily dose of insulin previous day: 12 units lispro    Diet: dysphagia pureed    Recommendation: discussed with Dr. Pepe Drummond to consider adding 5 units lantus insulin daily starting today and he entered order.     Sherin Martin RN [facility]  Pager: [phone]

## 2020-07-16 NOTE — ROUTINE PROCESS
Bedside and Verbal shift change report given to JOSIAH Devi RN (oncoming nurse) by KAMLESH Quinn RN (offgoing nurse). Report included the following information SBAR.

## 2020-07-16 NOTE — ROUTINE PROCESS
Bedside and Verbal shift change report given to David Trujillo RN (oncoming nurse) by Gaby Montero RN/MILA Quispe (offgoing nurse). Report included the following information SBAR, Kardex and Recent Results.

## 2020-07-16 NOTE — ROUTINE PROCESS
Bedside and Verbal shift change report given to Madan Smith and Kami Vegas (oncoming nurse) by Alfreda Wilson (offgoing nurse). Report included the following information SBAR, Kardex, Intake/Output, MAR and Recent Results.

## 2020-07-16 NOTE — PROGRESS NOTES
[facility]  Progress Note    Patient: Nirmala Kinsey Age: 71 y.o. : 1955 MR#: [id number] SSN: [id number]  Date/Time: 2020     Subjective: Patient alert awake, was able to make small conversation. Still confused but calm and cooperative. Denies any complaints. Assessment/Plan:     1. Acute on chronic encephalopathy:  Superimposed with chronic cerebrovascular disease, patient almost baseline per family. 2. No acute CVA per neuro: admitted because she was found on the floor by family. Continue aspirin Lipitor beta-blocker, patient could not tolerate MRI, repeat CT negative for acute stroke. 3. Paroxysmal atrial fibrillation: Continue Coreg, Eliquis. Cardiology signed off.  4. Indeterminate troponins: Not consistent with ACS, cardiology no further work-up. 5. Cardiomyopathy ejection fraction of 25%: Well compensated, continue Eliquis, beta-blocker and statin. No ACE inhibitor due to low normal blood pressure. 6. VALENTINA on CKD stage II: Creatinine back to baseline. 7. Multiple skin lesions and areas of pressure injury, all POA: Please refer to wound care consult note for detailed description of location of each of the 10+ wounds. S several of the skin lesions are possibly indicative of vitamin deficiencies. Some mild erythema redness noted but no signs of active infection. 8. Right thigh mild swelling with ecchymosis: No change in last 3 days, do not suspect active bleeding. 9. Hypothyroidism: Continue Synthroid. 10. Hypertension: BP stable, continue Coreg  11. Diabetes mellitus type 2:  BS slightly elevated, start low-dose of Lantus and continue SSI  12. Mental retardation with noncompliance of medication and medical regimen. Continue supportive care. 12.  Full code  PPD testing 24 and 48 hrs negative. Discussed with the patient at bedside  Discussed with case management, awaiting placement and pending level 2.   Discussed with Sister Vaishali Matias over the phone and updated current information. Case discussed with:  [x]Patient  []Family  [x]Nursing  []Case Management  DVT Prophylaxis:   Eliquis[x][]Hep SQ  []SCDs  []Coumadin   []Eliquis/Xarelto     Objective:   VS:   Visit Vitals  BP (!) 138/91 (BP 1 Location: Left arm, BP Patient Position: At rest)   Pulse (!) 110   Temp 98.3 °F (36.8 °C)   Resp 18   Ht 5' (1.524 m)   Wt 57.6 kg (126 lb 15.8 oz)   SpO2 97%   BMI 24.80 kg/m²      Tmax/24hrs: Temp (24hrs), Av °F (36.7 °C), Min:97.8 °F (36.6 °C), Max:98.3 °F (36.8 °C)  IOBRIEF    Intake/Output Summary (Last 24 hours) at 2020 1552  Last data filed at 2020 1402  Gross per 24 hour   Intake 480 ml   Output    Net 480 ml       General:  Alert, cooperative, no acute distress, confused    Pulmonary:  CTA Bilaterally. No Wheezing/Rales. Cardiovascular: Regular rate and Rhythm. GI:  Soft, Non distended, Non tender. + Bowel sounds. Neurologic: Alert and oriented X 1. Moves all extremities but limited in lower extremity,? Right lower extremity contracted at the hip joint and abducted with open pelvis, left leg patient has straightened up today. Additional: Right inner thigh mild swelling with ecchymosis, multiple erythematous patches with eschar and excoriated skin with ulcers with some redness noted in bilateral lower extremity.     Medications:   Current Facility-Administered Medications   Medication Dose Route Frequency    dextrose 10% infusion 125-250 mL  125-250 mL IntraVENous PRN    insulin glargine (LANTUS) injection 5 Units  5 Units SubCUTAneous DAILY    carvediloL (COREG) tablet 6.25 mg  6.25 mg Oral BID WITH MEALS    apixaban (ELIQUIS) tablet 2.5 mg  2.5 mg Oral BID    thiamine HCL (B-1) tablet 100 mg  100 mg Oral DAILY    ferrous sulfate tablet 325 mg  1 Tab Oral DAILY WITH BREAKFAST    cholecalciferol (VITAMIN D3) capsule 5,000 Units  5,000 Units Oral DAILY    ascorbic acid (vitamin C) (VITAMIN C) tablet 1,000 mg  1,000 mg Oral BID    levothyroxine (SYNTHROID) tablet 100 mcg  100 mcg Oral 6am    ondansetron (ZOFRAN) injection 4 mg  4 mg IntraVENous Q6H PRN    atorvastatin (LIPITOR) tablet 80 mg  80 mg Oral QHS    acetaminophen (TYLENOL) tablet 650 mg  650 mg Oral Q4H PRN    labetaloL (NORMODYNE;TRANDATE) 20 mg/4 mL (5 mg/mL) injection 5 mg  5 mg IntraVENous Q10MIN PRN    senna-docusate (PERICOLACE) 8.6-50 mg per tablet 2 Tab  2 Tab Oral QHS    insulin lispro (HUMALOG) injection   SubCUTAneous TIDAC    glucose chewable tablet 16 g  4 Tab Oral PRN    glucagon (GLUCAGEN) injection 1 mg  1 mg IntraMUSCular PRN    erythromycin (ILOTYCIN) 5 mg/gram (0.5 %) ophthalmic ointment   Both Eyes Q4H    famotidine (PEPCID) tablet 20 mg  20 mg Oral DAILY    vits A and D-white pet-lanolin (A&D) ointment   Topical BID    LORazepam (ATIVAN) injection 1 mg  1 mg IntraVENous Q2H PRN    sodium chloride (NS) flush 5-10 mL  5-10 mL IntraVENous PRN       Labs:    Recent Results (from the past 24 hour(s))   GLUCOSE, POC    Collection Time: 07/15/20  4:14 PM   Result Value Ref Range    Glucose (POC) 201 (H) 70 - 110 mg/dL   GLUCOSE, POC    Collection Time: 07/15/20  9:35 PM   Result Value Ref Range    Glucose (POC) 204 (H) 70 - 110 mg/dL   CBC W/O DIFF    Collection Time: 07/16/20  1:56 AM   Result Value Ref Range    WBC 10.3 4.6 - 13.2 K/uL    RBC 3.63 (L) 4.20 - 5.30 M/uL    HGB 11.5 (L) 12.0 - 16.0 g/dL    HCT 34.6 (L) 35.0 - 45.0 %    MCV 95.3 74.0 - 97.0 FL    MCH 31.7 24.0 - 34.0 PG    MCHC 33.2 31.0 - 37.0 g/dL    RDW 14.1 11.6 - 14.5 %    PLATELET 483 372 - 440 K/uL    MPV 11.4 9.2 - 11.8 FL   GLUCOSE, POC    Collection Time: 07/16/20  6:38 AM   Result Value Ref Range    Glucose (POC) 110 70 - 110 mg/dL   GLUCOSE, POC    Collection Time: 07/16/20 12:20 PM   Result Value Ref Range    Glucose (POC) 188 (H) 70 - 110 mg/dL       Signed By: Kaylee Tripp MD     July 16, 2020      Disclaimer: Sections of this note are dictated using utilizing voice recognition software. Minor typographical errors may be present. If questions arise, please do not hesitate to contact me or call our department.

## 2020-07-16 NOTE — PROGRESS NOTES
Spoke with [facility]. He stated he will start auth and keep CM informed. 1035: Maria A Day from [facility] was here to see pt.    1500: Per [facility], he is still waiting for auth.       Keily Pabon, KIMN RN  Care Management  Pager: [phone]

## 2020-07-17 NOTE — PROGRESS NOTES
Problem: Self Care Deficits Care Plan (Adult)  Goal: *Acute Goals and Plan of Care (Insert Text)  Description: Occupational Therapy Goals  Initiated 7/8/2020, Re-evaluated 7/15/2020 within 7 day(s). Continue all goals    1. Patient will perform bed mobility with moderate assistance in preparation for further self-care. 2.  Patient will perform self-feeding with modified independence. 3.  Patient will perform grooming with supervision/set-up. 4.  Patient will perform a functional activity/ADL sitting EOB, presenting with F sitting balance. 5.  Patient will participate in upper extremity therapeutic exercise/activities with supervision/set-up for 8 minutes. Prior Level of Function: Pt reports she was (I) with self-feeding, grooming, unable to state whether she needed assist with UB/LB dressing, bathing, and toileting PTA. Pt had a cane. Per chart review, pt lived alone. Outcome: Progressing Towards Goal   OCCUPATIONAL THERAPY TREATMENT    Patient: Macrina Jefferson (53 y.o. female)  Date: 7/17/2020  Diagnosis: A-fib [facility]) [I48.91]  CVA (cerebral vascular accident) ([facility] [address] [I63.9]  VALENTINA (acute kidney injury) ([facility] [N17.9]   <principal problem not specified>       Precautions: Fall, Aspiration, Skin    Chart, occupational therapy assessment, plan of care, and goals were reviewed. ASSESSMENT:  Patient drowsy upon entry into room but was agreeable to eating breakfast when awoken and prompted. HOB elevated to >60 degrees and patient given a spoon for self feeding. Verbal and tactile cuing needed to scoop pureed food onto spoon and decrease bite size. She was able to cut the soft food using her spoon. Extra time needed and patient closed her eyes at times. Gentle cuing provided to maintain alert state. Tactile cue on hand given for grasping cup and verbal cuing needed to tip cup up for drinking. After eating breakfast, patient able to wash her face with minimal assistance.   She was left supine in bed with HOB elevated at end of session. Progression toward goals:  []          Improving appropriately and progressing toward goals  [x]          Improving slowly and progressing toward goals  []          Not making progress toward goals and plan of care will be adjusted     PLAN:  Patient continues to benefit from skilled intervention to address the above impairments. Continue treatment per established plan of care. Discharge Recommendations:  [facility] vs [facility]  Further Equipment Recommendations for Discharge:  N/A     SUBJECTIVE:   Patient stated I want to eat.     OBJECTIVE DATA SUMMARY:   Cognitive/Behavioral Status:  Neurologic State: Confused, Drowsy  Orientation Level: Oriented to person, Disoriented to place, Disoriented to time  Cognition: Decreased command following, Decreased attention/concentration  Safety/Judgement: Fall prevention, Lack of insight into deficits    ADL Intervention:  Feeding  Feeding Assistance: Set-up; Supervision  Cutting Food: Supervision  Utensil Management: Supervision;Set-up(Placement of spoon in her hand and readjustment)  Food to Mouth: Supervision  Drink to Mouth: Set-up; Supervision(Assist with reaching cup with hand)  Cues: Tactile cues provided;Verbal cues provided(decrease bite size)    Grooming  Position Performed: (Supine in bed with HOB elevated)  Washing Face: Minimum assistance  Cues: Tactile cues provided;Verbal cues provided    Cognitive Retraining  Safety/Judgement: Fall prevention;Lack of insight into deficits    Pain:  Pain level pre-treatment: 0/10   Pain level post-treatment: 0/10  Pain Intervention(s): NA  Response to intervention: NA    Activity Tolerance:    Good  Please refer to the flowsheet for vital signs taken during this treatment.   After treatment:   []  Patient left in no apparent distress sitting up in chair  [x]  Patient left in no apparent distress in bed  [x]  Call bell left within reach  [x]  Nursing notified  []  Caregiver present  []  Bed alarm activated    COMMUNICATION/EDUCATION:   [x] Role of Occupational Therapy in the acute care setting  [x] Home safety education was provided and the patient/caregiver indicated understanding. [] Patient/family have participated as able in working towards goals and plan of care. [] Patient/family agree to work toward stated goals and plan of care. [] Patient understands intent and goals of therapy, but is neutral about his/her participation. [x] Patient is unable to participate in goal setting and plan of care.       Thank you for this referral.  Claudia Lora MS OTR/L   Time Calculation: 30 mins

## 2020-07-17 NOTE — PROGRESS NOTES
[facility]  [facility]    Patient: Seda Yates Age: 71 y.o. : 1955 MR#: [id number] SSN: [id number]  Date/Time: 2020     Subjective: Patient alert awake, was able to make small conversation. Still confused but calm and cooperative. Denies any complaints. Assessment/Plan:     1. Acute on chronic encephalopathy:  Superimposed with chronic cerebrovascular disease, patient almost baseline per family. 2. No acute CVA per neuro: admitted because she was found on the floor by family. Continue aspirin Lipitor beta-blocker, patient could not tolerate MRI, repeat CT negative for acute stroke. 3. Paroxysmal atrial fibrillation: Continue Coreg, Eliquis. Cardiology signed off.  4. Indeterminate troponins: Not consistent with ACS, cardiology no further work-up. 5. Cardiomyopathy ejection fraction of 25%: Well compensated, continue Eliquis, beta-blocker and statin. No ACE inhibitor due to low normal blood pressure. 6. VALENTINA on CKD stage II: Creatinine back to baseline. 7. Multiple skin lesions and areas of pressure injury, all POA: Please refer to wound care consult note for detailed description of location of each of the 10+ wounds. S several of the skin lesions are possibly indicative of vitamin deficiencies. Some mild erythema redness noted but no signs of active infection. 8. Right thigh mild swelling with ecchymosis: No change in last 3 days, do not suspect active bleeding. 9. Hypothyroidism: Continue Synthroid. 10. Hypertension: BP stable, continue Coreg  11. Diabetes mellitus type 2:  BS slightly elevated, start low-dose of Lantus and continue SSI  12. Mental retardation with noncompliance of medication and medical regimen. Continue supportive care. 12.  Full code  PPD testing 24 and 48 hrs negative. Discussed with the patient at bedside  Discussed with case management, awaiting placement and pending level 2.   Discussed with Sister Benedicto Allen over the phone and updated current information. Case discussed with:  [x]Patient  []Family  [x]Nursing  []Case Management  DVT Prophylaxis:   Eliquis[x][]Hep SQ  []SCDs  []Coumadin   []Eliquis/Xarelto     Objective:   VS:   Visit Vitals  BP 90/68 (BP 1 Location: Left arm, BP Patient Position: At rest)   Pulse 89   Temp 98.2 °F (36.8 °C)   Resp 19   Ht 5' (1.524 m)   Wt 57.6 kg (126 lb 15.8 oz)   SpO2 93%   BMI 24.80 kg/m²      Tmax/24hrs: Temp (24hrs), Av.2 °F (36.8 °C), Min:97.6 °F (36.4 °C), Max:98.7 °F (37.1 °C)  IOBRIEF    Intake/Output Summary (Last 24 hours) at 2020 1250  Last data filed at 2020 1216  Gross per 24 hour   Intake 360 ml   Output    Net 360 ml       General:  Alert, cooperative, no acute distress, confused    Pulmonary:  CTA Bilaterally. No Wheezing/Rales. Cardiovascular: Regular rate and Rhythm. GI:  Soft, Non distended, Non tender. + Bowel sounds. Neurologic: Alert and oriented X 1. Moves all extremities but limited in lower extremity,? Right lower extremity contracted at the hip joint and abducted with open pelvis, left leg patient has straightened up today. Additional: Right inner thigh mild swelling with ecchymosis, multiple erythematous patches with eschar and excoriated skin with ulcers with some redness noted in bilateral lower extremity.     Medications:   Current Facility-Administered Medications   Medication Dose Route Frequency    dextrose 10% infusion 125-250 mL  125-250 mL IntraVENous PRN    insulin glargine (LANTUS) injection 5 Units  5 Units SubCUTAneous DAILY    carvediloL (COREG) tablet 6.25 mg  6.25 mg Oral BID WITH MEALS    apixaban (ELIQUIS) tablet 2.5 mg  2.5 mg Oral BID    thiamine HCL (B-1) tablet 100 mg  100 mg Oral DAILY    ferrous sulfate tablet 325 mg  1 Tab Oral DAILY WITH BREAKFAST    cholecalciferol (VITAMIN D3) capsule 5,000 Units  5,000 Units Oral DAILY    ascorbic acid (vitamin C) (VITAMIN C) tablet 1,000 mg  1,000 mg Oral BID    levothyroxine (SYNTHROID) tablet 100 mcg  100 mcg Oral 6am    ondansetron (ZOFRAN) injection 4 mg  4 mg IntraVENous Q6H PRN    atorvastatin (LIPITOR) tablet 80 mg  80 mg Oral QHS    acetaminophen (TYLENOL) tablet 650 mg  650 mg Oral Q4H PRN    labetaloL (NORMODYNE;TRANDATE) 20 mg/4 mL (5 mg/mL) injection 5 mg  5 mg IntraVENous Q10MIN PRN    senna-docusate (PERICOLACE) 8.6-50 mg per tablet 2 Tab  2 Tab Oral QHS    insulin lispro (HUMALOG) injection   SubCUTAneous TIDAC    glucose chewable tablet 16 g  4 Tab Oral PRN    glucagon (GLUCAGEN) injection 1 mg  1 mg IntraMUSCular PRN    erythromycin (ILOTYCIN) 5 mg/gram (0.5 %) ophthalmic ointment   Both Eyes Q4H    famotidine (PEPCID) tablet 20 mg  20 mg Oral DAILY    vits A and D-white pet-lanolin (A&D) ointment   Topical BID    LORazepam (ATIVAN) injection 1 mg  1 mg IntraVENous Q2H PRN    sodium chloride (NS) flush 5-10 mL  5-10 mL IntraVENous PRN       Labs:    Recent Results (from the past 24 hour(s))   GLUCOSE, POC    Collection Time: 07/16/20  4:17 PM   Result Value Ref Range    Glucose (POC) 218 (H) 70 - 110 mg/dL   GLUCOSE, POC    Collection Time: 07/16/20  9:12 PM   Result Value Ref Range    Glucose (POC) 154 (H) 70 - 110 mg/dL   GLUCOSE, POC    Collection Time: 07/17/20  6:43 AM   Result Value Ref Range    Glucose (POC) 110 70 - 110 mg/dL   GLUCOSE, POC    Collection Time: 07/17/20 10:54 AM   Result Value Ref Range    Glucose (POC) 135 (H) 70 - 110 mg/dL       Signed By: Vannessa Connors MD     July 17, 2020      Disclaimer: Sections of this note are dictated using utilizing voice recognition software. Minor typographical errors may be present. If questions arise, please do not hesitate to contact me or call our department.

## 2020-07-17 NOTE — ROUTINE PROCESS
Bedside and Verbal shift change report given to Madan Smith and Niharika Pollard (oncoming nurse) by Marla Scott (offgoing nurse). Report included the following information SBAR, Kardex, Intake/Output, MAR and Recent Results.

## 2020-07-17 NOTE — ROUTINE PROCESS
Bedside and Verbal shift change report given to Ramses Anderson (oncoming nurse) by Juana Martinez RN (offgoing nurse). Report included the following information SBAR, Kardex and Recent Results.

## 2020-07-17 NOTE — PROGRESS NOTES
Spoke with [address] admission rep., Rehana Venegas. Per Everton Buenrostro is apparently back logged with [facility].  KANDI Coto, [facility]- [phone]

## 2020-07-18 NOTE — ROUTINE PROCESS
1935: Assumed patient care from [facility] 6. Patient is alert and oriented to person, place,but disoriented to time and situation. Respiratory status Is stable on room air. Vital signs are stable. MEWS score is a one. Patient denies any pain, discomfort, nausea vomiting dizziness or anxiety. White board and fall card is updated. Bed is locked and in lowest position. Call bell, water and personal belongings are within reach. Patient has no questions, comments or concerns after bedside shift report. 0700: Patient had an uneventful shift. Respiratory status, vital signs and MEWS score remained stable. Patient was resting quietly with no signs of distress noted. Bed locked and in lowest position. Call bell water and personal belongings were within reach. Patient had no questions, comments or concerns after bedside shift report.  Bedside report given to Lesvia Rivera R.N.

## 2020-07-18 NOTE — PROGRESS NOTES
[facility]  [facility]    Patient: Jose Luis Christian Age: 71 y.o. : 1955 MR#: [id number] SSN: [id number]  Date/Time: 2020     Subjective: Patient alert awake, was able to make small conversation. Still confused but calm and cooperative. Denies any complaints. Assessment/Plan:     1. Acute on chronic encephalopathy:  Superimposed with chronic cerebrovascular disease, patient almost baseline per family. 2. No acute CVA per neuro: admitted because she was found on the floor by family. Continue aspirin Lipitor beta-blocker, patient could not tolerate MRI, repeat CT negative for acute stroke. 3. Paroxysmal atrial fibrillation: Continue Coreg, Eliquis. Cardiology signed off.  4. Indeterminate troponins: Not consistent with ACS, cardiology no further work-up. 5. Cardiomyopathy ejection fraction of 25%: Well compensated, continue Eliquis, beta-blocker and statin. No ACE inhibitor due to low normal blood pressure. 6. VALENTINA on CKD stage II: Creatinine back to baseline. 7. Multiple skin lesions and areas of pressure injury, all POA: Please refer to wound care consult note for detailed description of location of each of the 10+ wounds. S several of the skin lesions are possibly indicative of vitamin deficiencies. Some mild erythema redness noted but no signs of active infection. 8. Right thigh mild swelling with ecchymosis: No change in last 3 days, do not suspect active bleeding. 9. Hypothyroidism: Continue Synthroid. 10. Hypertension: BP stable, continue Coreg  11. Diabetes mellitus type 2:  BS slightly elevated, start low-dose of Lantus and continue SSI  12. Mental retardation with noncompliance of medication and medical regimen. Continue supportive care. 12.  Full code  PPD testing 24 and 48 hrs negative. Discussed with the patient at bedside  Discussed with case management, awaiting placement and pending level 2.           Case discussed with: [x]Patient  []Family  [x]Nursing  []Case Management  DVT Prophylaxis:   Eliquis[x][]Hep SQ  []SCDs  []Coumadin   []Eliquis/Xarelto     Objective:   VS:   Visit Vitals  /89 (BP 1 Location: Right arm, BP Patient Position: Supine)   Pulse (!) 105   Temp 97.5 °F (36.4 °C)   Resp 20   Ht 5' (1.524 m)   Wt 57.6 kg (126 lb 15.8 oz)   SpO2 90%   BMI 24.80 kg/m²      Tmax/24hrs: Temp (24hrs), Av.1 °F (36.7 °C), Min:97.5 °F (36.4 °C), Max:98.6 °F (37 °C)  IOBRIEF    Intake/Output Summary (Last 24 hours) at 2020 1248  Last data filed at 2020 1935  Gross per 24 hour   Intake 240 ml   Output 0 ml   Net 240 ml       General:  Alert, cooperative, no acute distress, confused    Pulmonary:  CTA Bilaterally. No Wheezing/Rales. Cardiovascular: Regular rate and Rhythm. GI:  Soft, Non distended, Non tender. + Bowel sounds. Neurologic: Alert and oriented X 1. Moves all extremities but limited in lower extremity,? Right lower extremity contracted at the hip joint and abducted with open pelvis, left leg patient has straightened up today. Additional: Right inner thigh mild swelling with ecchymosis, multiple erythematous patches with eschar and excoriated skin with ulcers with some redness noted in bilateral lower extremity.     Medications:   Current Facility-Administered Medications   Medication Dose Route Frequency    dextrose 10% infusion 125-250 mL  125-250 mL IntraVENous PRN    insulin glargine (LANTUS) injection 5 Units  5 Units SubCUTAneous DAILY    carvediloL (COREG) tablet 6.25 mg  6.25 mg Oral BID WITH MEALS    apixaban (ELIQUIS) tablet 2.5 mg  2.5 mg Oral BID    thiamine HCL (B-1) tablet 100 mg  100 mg Oral DAILY    ferrous sulfate tablet 325 mg  1 Tab Oral DAILY WITH BREAKFAST    cholecalciferol (VITAMIN D3) capsule 5,000 Units  5,000 Units Oral DAILY    ascorbic acid (vitamin C) (VITAMIN C) tablet 1,000 mg  1,000 mg Oral BID    levothyroxine (SYNTHROID) tablet 100 mcg  100 mcg Oral 6am    ondansetron (ZOFRAN) injection 4 mg  4 mg IntraVENous Q6H PRN    atorvastatin (LIPITOR) tablet 80 mg  80 mg Oral QHS    acetaminophen (TYLENOL) tablet 650 mg  650 mg Oral Q4H PRN    labetaloL (NORMODYNE;TRANDATE) 20 mg/4 mL (5 mg/mL) injection 5 mg  5 mg IntraVENous Q10MIN PRN    senna-docusate (PERICOLACE) 8.6-50 mg per tablet 2 Tab  2 Tab Oral QHS    insulin lispro (HUMALOG) injection   SubCUTAneous TIDAC    glucose chewable tablet 16 g  4 Tab Oral PRN    glucagon (GLUCAGEN) injection 1 mg  1 mg IntraMUSCular PRN    erythromycin (ILOTYCIN) 5 mg/gram (0.5 %) ophthalmic ointment   Both Eyes Q4H    famotidine (PEPCID) tablet 20 mg  20 mg Oral DAILY    vits A and D-white pet-lanolin (A&D) ointment   Topical BID    LORazepam (ATIVAN) injection 1 mg  1 mg IntraVENous Q2H PRN    sodium chloride (NS) flush 5-10 mL  5-10 mL IntraVENous PRN       Labs:    Recent Results (from the past 24 hour(s))   GLUCOSE, POC    Collection Time: 07/17/20  3:52 PM   Result Value Ref Range    Glucose (POC) 181 (H) 70 - 110 mg/dL   GLUCOSE, POC    Collection Time: 07/17/20  9:10 PM   Result Value Ref Range    Glucose (POC) 185 (H) 70 - 110 mg/dL   GLUCOSE, POC    Collection Time: 07/18/20  6:20 AM   Result Value Ref Range    Glucose (POC) 140 (H) 70 - 110 mg/dL   GLUCOSE, POC    Collection Time: 07/18/20 11:47 AM   Result Value Ref Range    Glucose (POC) 207 (H) 70 - 110 mg/dL       Signed By: Jaspreet Forrester MD     July 18, 2020      Disclaimer: Sections of this note are dictated using utilizing voice recognition software. Minor typographical errors may be present. If questions arise, please do not hesitate to contact me or call our department.

## 2020-07-19 NOTE — PROGRESS NOTES
Problem: Falls - Risk of  Goal: *Absence of Falls  Description: Document Dominique Patches Fall Risk and appropriate interventions in the flowsheet. Outcome: Progressing Towards Goal  Note: Fall Risk Interventions:       Mentation Interventions: Bed/chair exit alarm, Door open when patient unattended, Adequate sleep, hydration, pain control    Medication Interventions: Bed/chair exit alarm, Patient to call before getting OOB    Elimination Interventions: Call light in reach, Bed/chair exit alarm, Toileting schedule/hourly rounds, Patient to call for help with toileting needs    History of Falls Interventions: Bed/chair exit alarm, Door open when patient unattended         Problem: Pressure Injury - Risk of  Goal: *Prevention of pressure injury  Description: Document Ryan Scale and appropriate interventions in the flowsheet.   Outcome: Progressing Towards Goal  Note: Pressure Injury Interventions:  Sensory Interventions: Assess changes in LOC, Check visual cues for pain    Moisture Interventions: Absorbent underpads, Check for incontinence Q2 hours and as needed    Activity Interventions: Pressure redistribution bed/mattress(bed type)    Mobility Interventions: HOB 30 degrees or less, Pressure redistribution bed/mattress (bed type)    Nutrition Interventions: Document food/fluid/supplement intake    Friction and Shear Interventions: HOB 30 degrees or less, Minimize layers

## 2020-07-19 NOTE — PROGRESS NOTES
0705 Pt blood sugar 61. Pt alert and oriented. 8 oz orange juice given. 8468 pt blood sugar rechecked to be 66. Breakfast tray given to pt. Updated day shift nurse Allison Keller of pt condition.

## 2020-07-19 NOTE — ROUTINE PROCESS
Bedside and Verbal shift change report given to Anca Gonzales (oncoming nurse) by Gagan Henry RN (offgoing nurse). Report included the following information SBAR, Kardex, Intake/Output, MAR and Recent Results.

## 2020-07-19 NOTE — PROGRESS NOTES
[facility]  [facility]    Patient: Topher Galvan Age: 71 y.o. : 1955 MR#: [id number] SSN: [id number]  Date/Time: 2020     Subjective: Patient alert awake, was able to make small conversation. Still confused but calm and cooperative. Denies any complaints. Assessment/Plan:     1. Acute on chronic encephalopathy:  Superimposed with chronic cerebrovascular disease, patient almost baseline per family. 2. No acute CVA per neuro: admitted because she was found on the floor by family. Continue aspirin Lipitor beta-blocker, patient could not tolerate MRI, repeat CT negative for acute stroke. 3. Paroxysmal atrial fibrillation: Continue Coreg, Eliquis. Cardiology signed off.  4. Indeterminate troponins: Not consistent with ACS, cardiology no further work-up. 5. Cardiomyopathy ejection fraction of 25%: Well compensated, continue Eliquis, beta-blocker and statin. No ACE inhibitor due to low normal blood pressure. 6. VALENTINA on CKD stage II: Creatinine back to baseline. 7. Multiple skin lesions and areas of pressure injury, all POA: Please refer to wound care consult note for detailed description of location of each of the 10+ wounds. S several of the skin lesions are possibly indicative of vitamin deficiencies. Some mild erythema redness noted but no signs of active infection. 8. Right thigh mild swelling with ecchymosis: No change in last 3 days, do not suspect active bleeding. 9. Hypothyroidism: Continue Synthroid. 10. Hypertension: BP stable, continue Coreg  11. Diabetes mellitus type 2:  BS slightly elevated, start low-dose of Lantus and continue SSI  12. Mental retardation with noncompliance of medication and medical regimen. Continue supportive care. 12.  Full code  PPD testing 24 and 48 hrs negative. Discussed with the patient at bedside  Discussed with case management, awaiting placement and pending level 2.     No change in treatment plan, awaiting placement    Case discussed with:  [x]Patient  []Family  [x]Nursing  []Case Management  DVT Prophylaxis:   Eliquis[x][]Hep SQ  []SCDs  []Coumadin   []Eliquis/Xarelto     Objective:   VS:   Visit Vitals  /70 (BP 1 Location: Left arm, BP Patient Position: At rest)   Pulse 81   Temp 98 °F (36.7 °C)   Resp 16   Ht 5' (1.524 m)   Wt 57.6 kg (126 lb 15.8 oz)   SpO2 94%   BMI 24.80 kg/m²      Tmax/24hrs: Temp (24hrs), Av.7 °F (36.5 °C), Min:97.5 °F (36.4 °C), Max:98 °F (36.7 °C)  IOBRIEF    Intake/Output Summary (Last 24 hours) at 2020 1128  Last data filed at 2020 0908  Gross per 24 hour   Intake 240 ml   Output    Net 240 ml       General:  Alert, cooperative, no acute distress, confused    Pulmonary:  CTA Bilaterally. No Wheezing/Rales. Cardiovascular: Regular rate and Rhythm. GI:  Soft, Non distended, Non tender. + Bowel sounds. Neurologic: Alert and oriented X 1. Moves all extremities but limited in lower extremity,? Right lower extremity contracted at the hip joint and abducted with open pelvis, left leg patient has straightened up today. Additional: Right inner thigh mild swelling with ecchymosis, multiple erythematous patches with eschar and excoriated skin with ulcers with some redness noted in bilateral lower extremity.     Medications:   Current Facility-Administered Medications   Medication Dose Route Frequency    dextrose 10% infusion 125-250 mL  125-250 mL IntraVENous PRN    insulin glargine (LANTUS) injection 5 Units  5 Units SubCUTAneous DAILY    carvediloL (COREG) tablet 6.25 mg  6.25 mg Oral BID WITH MEALS    apixaban (ELIQUIS) tablet 2.5 mg  2.5 mg Oral BID    thiamine HCL (B-1) tablet 100 mg  100 mg Oral DAILY    ferrous sulfate tablet 325 mg  1 Tab Oral DAILY WITH BREAKFAST    cholecalciferol (VITAMIN D3) capsule 5,000 Units  5,000 Units Oral DAILY    ascorbic acid (vitamin C) (VITAMIN C) tablet 1,000 mg  1,000 mg Oral BID    levothyroxine (SYNTHROID) tablet 100 mcg  100 mcg Oral 6am    ondansetron (ZOFRAN) injection 4 mg  4 mg IntraVENous Q6H PRN    atorvastatin (LIPITOR) tablet 80 mg  80 mg Oral QHS    acetaminophen (TYLENOL) tablet 650 mg  650 mg Oral Q4H PRN    labetaloL (NORMODYNE;TRANDATE) 20 mg/4 mL (5 mg/mL) injection 5 mg  5 mg IntraVENous Q10MIN PRN    senna-docusate (PERICOLACE) 8.6-50 mg per tablet 2 Tab  2 Tab Oral QHS    insulin lispro (HUMALOG) injection   SubCUTAneous TIDAC    glucose chewable tablet 16 g  4 Tab Oral PRN    glucagon (GLUCAGEN) injection 1 mg  1 mg IntraMUSCular PRN    erythromycin (ILOTYCIN) 5 mg/gram (0.5 %) ophthalmic ointment   Both Eyes Q4H    famotidine (PEPCID) tablet 20 mg  20 mg Oral DAILY    vits A and D-white pet-lanolin (A&D) ointment   Topical BID    LORazepam (ATIVAN) injection 1 mg  1 mg IntraVENous Q2H PRN    sodium chloride (NS) flush 5-10 mL  5-10 mL IntraVENous PRN       Labs:    Recent Results (from the past 24 hour(s))   GLUCOSE, POC    Collection Time: 07/18/20 11:47 AM   Result Value Ref Range    Glucose (POC) 207 (H) 70 - 110 mg/dL   GLUCOSE, POC    Collection Time: 07/18/20  3:30 PM   Result Value Ref Range    Glucose (POC) 175 (H) 70 - 110 mg/dL   GLUCOSE, POC    Collection Time: 07/19/20  7:05 AM   Result Value Ref Range    Glucose (POC) 61 (L) 70 - 110 mg/dL   GLUCOSE, POC    Collection Time: 07/19/20  8:27 AM   Result Value Ref Range    Glucose (POC) 38 (LL) 70 - 110 mg/dL   GLUCOSE, POC    Collection Time: 07/19/20  8:30 AM   Result Value Ref Range    Glucose (POC) 124 (H) 70 - 110 mg/dL       Signed By: Alex Joseph MD     July 19, 2020      Disclaimer: Sections of this note are dictated using utilizing voice recognition software. Minor typographical errors may be present. If questions arise, please do not hesitate to contact me or call our department.

## 2020-07-19 NOTE — ROUTINE PROCESS
Bedside and Verbal shift change report given to Antoine Booth RN (oncoming nurse) by KAMLESH Quinn RN (offgoing nurse). Report included the following information SBAR.

## 2020-07-19 NOTE — ROUTINE PROCESS
Bedside and Verbal shift change report given to Johny Welsh RN (oncoming nurse) by KAMLESH Quinn RN (offgoing nurse). Report included the following information SBAR.

## 2020-07-20 NOTE — ROUTINE PROCESS
Pt has deep bruises on hips and thighs. she has dressing to lower extremities with scabs to prevent the patient from scratching her skin. Her romulo area vagina and rectal areas are grossly edematous.

## 2020-07-20 NOTE — PROGRESS NOTES
Transition of Care Plan to SNF/Rehab    SNF/Rehab Transition:  Patient has been accepted to [facility] and meets criteria for LTC admission. Patient will transported by [facility] and expected to leave at 3pm.    Communication to Patient/Family:  Met with patient and spoke with her sister Christian (identified care giver) and they are agreeable to the transition plan. Communication to SNF/Rehab:  Bedside RN, Sg Arana has been notified to update the transition plan to the facility and call report (phone number [phone]). Discharge information has been updated on the AVS.     Discharge instructions to be fax'd to facility . Nursing Please include all hard scripts for controlled substances, med rec and dc summary, and AVS in packet. Reviewed and confirmed with facility, [facility], can manage the patient care needs for the following:     SNF/Rehab Transition:       Leah Cha with (X) only those applicable:    Medication:  [x]  Medications will be available at the facility  []  IV Antibiotics   []  Controlled Substance - hard copy to be sent with patient   []  Weekly Labs   Documents:  [] Hard RX  [] MAR  [] Kardex  [] AVS  [x]Transfer Summary  [x]Discharge   Equipment:  []  CPAP/BiPAP  []  Wound Vacuum  []  Marlow or Urinary Device  []  PICC/Central Line  []  Nebulizer  []  Ventilator   Treatment:  []Isolation (for MRSA, VRE, etc.)  []Surgical Drain Management  []Tracheostomy Care  []Dressing Changes  []Dialysis with transportation and chair time   []PEG Care  []Oxygen  []Daily Weights for Heart Failure   Dietary:  []Any diet limitations  []Tube Feedings   []Total Parenteral Management (TPN)   Eligible for Medicaid Long Term Services and Supports  Yes:  [] Eligible for medical assistance or will become eligible within 180 days and UAI completed. [] Provider/Patient and/or support system has requested screening. [x] UAI copy provided to [employer].   [] UAI unavailable at discharge will send once processed to SNF provider. [] UAI unavailable at discharged mailed to patient  No:   [] Private pay and is not financially eligible for Medicaid within the next 180 days. [] Reside out-of-state.   [] A residents of a state owned/operated facility that is licensed  by [address] [employer] [facility] or [facility]  [] Enrollment in [facility] hospice services  [] [address] [place] [address]  [] Patient /Family declines to have screening completed or provide financial information for screening     Financial Resources:  Medicaid    [] Initiated and application pending   [x] Full coverage     Advanced Care Plan:  []Surrogate Decision Maker of Care  []POA  []Communicated Code Status Full (DDNR\", \"Full\")    Other       TAMIKO Kirk RN  Care Management  Pager: [phone]

## 2020-07-20 NOTE — PROGRESS NOTES
Spoke with Melisa Whitehead, admission rep at [facility], who is prepared to accept pt today LTC. Updated CM, Miladis Ramos.  Leobardo Friend, MSW, [place]- [phone]

## 2020-07-20 NOTE — ROUTINE PROCESS
Bedside and Verbal shift change report given to [employer] (oncoming nurse) by Chapito Caruso (offgoing nurse). Report included the following information SBAR and Kardex.

## 2020-07-20 NOTE — PROGRESS NOTES
Transportation arrangements per Mati Isaac (FAN)  [employer] - spoke to [facility].  Scheduled a stretcher transport to:    [address]  [phone]   Confirmation # [id number]  [facility] time - 15:00 today  Informed FAN of transportation details

## 2020-07-20 NOTE — DISCHARGE SUMMARY
Discharge Summary    Patient: Nellie Brennan MRN: [id number]  CSN: [id number]    YOB: 1955  Age: 71 y.o. Sex: female    DOA: 7/7/2020 LOS:  LOS: 12 days   Discharge Date:      Admission Diagnoses: A-fib ([facility] [I48.91]  CVA (cerebral vascular accident) ([facility] [I63.9]  VALENTINA (acute kidney injury) ([facility] [N17.9]    Discharge Diagnoses:    A. fib with RVR  Acute metabolic encephalopathy  History of hypertension  History of dilated cardiomyopathy  History of PAF  History of hypothyroidism  History of intellectual deficit/MR  History of type 2 diabetes  CKD 3    Discharge Condition: Stable    Consults: Cardiology and Neurology    PHYSICAL EXAM  Visit Vitals  /83 (BP 1 Location: Left arm, BP Patient Position: At rest)   Pulse 91   Temp 97.9 °F (36.6 °C)   Resp 18   Ht 5' (1.524 m)   Wt 59.3 kg (130 lb 11.7 oz)   SpO2 91%   BMI 25.53 kg/m²       General: Alert, Not cooperative, no acute distress    HEENT: PERRLA, EOMI. Anicteric sclerae. Lungs:  Decreased BS in bases   Heart:  Regular rate and Rhythm. Abdomen: Soft, Non distended, Non tender. + Bowel sounds. Extremities: No edema/ cyanosis/ clubbing  Psych:   Good insight. Not anxious or agitated. Neurologic:  Alert & awake                               HPI:  Nellie Brennan is a 71 y.o. female with medical co-morbidities including HTN, dilated cardiomyopathy, paroxysmal AFib not on OA, hypothyroidism, intellectual deficit, presented from home via EMS after her sister found her on the floor. According to patient, she fell the other night and she could not get up. She does not know how long she has been on the floor. Her sister found her on the floor to be alert and awake. She denied pain in her joints or hip, she has left lower rib pain on palpation. Currently, she denies headache, no chest pain or shortness of breath, no swallowing pain, no leg pain. According to her sister John Avila), at bedside, she was last seen normal on Friday.   Otherwise, the patient has been living at home by herself since her mother was admitted to nursing home about a year ago. Patient has not been followed up with PCP or known to take her medications. Patient is a poor historian overall. In the ER, she was found with AFIB with RVR, no fever no leukocytosis. She was give IV lopressor with rate control. Amiodarone oral was given. IV 500cc NS was given due to concern for her h/o EF30%. Renal function was depressed. Troponin was elevated. CT head was concern for subacute stroke. Hence, Hep gtt was started. Hospital Course:   Patient admitted to the hospital with atrial fibrillation with RVR. She has a known history of PAF and is not on anticoagulation secondary to previous GI bleed. Patient was seen by cardiology and started on IV amiodarone with heparin infusion. Patient was weaned off IV amiodarone drip and started on Coreg, she was also taken off heparin infusion and switched to [facility] with Eliquis. She is currently being discharged on Eliquis. Patient was also seen by neurology for since CT head on admission showed probable subacute infarct right posterior frontal region. Neurology recommended MRI/MRA brain however patient was unable to complete the test secondary to agitation. A repeat CT head was done which showed old right MCA infarct with associated encephalomalacia. The patient also had acute metabolic encephalopathy which was likely secondary to uremia. Patient was hydrated with IV fluids. Per neurology it was okay to continue with oral anticoagulation. Given the fact that patient has a history of intellectual deficit/MR and lives alone, case management was involved in discharge planning and placement. Patient is currently being discharged to skilled nursing facility and is advised to follow-up with her primary care physician and cardiology in 1 to 2 weeks. Imaging:  CT head  IMPRESSION:      Probable subacute infarct right posterior frontal region.      CT head  IMPRESSION:  1. No acute intracranial hemorrhage, mass effect or midline structural shift. 2. Old right MCA infarct with associated encephalomalacia. Unchanged moderate  burden of small vessel disease.     Follow-up with MRI is recommended if acute ischemia is suspected given  limitations of CT. CT pelvis without contrast  IMPRESSION:     Additional deformity of the hips. No acute osseous abnormality evident. Diffuse soft tissue edema is present. No soft tissue gas is seen. Diffuse gaseous distention of bowel. No pelvic mass is apparent. Nonobstructive lower pole right renal calculus. There is lobular projection of density from the anus-correlate with direct  Inspection. Procedures:   None        Discharge Medications:     Current Discharge Medication List      START taking these medications    Details   thiamine HCL (B-1) 100 mg tablet Take 1 Tab by mouth daily. Qty: 30 Tab, Refills: 0      senna-docusate (PERICOLACE) 8.6-50 mg per tablet Take 2 Tabs by mouth nightly. Qty: 30 Tab, Refills: 0      ferrous sulfate 325 mg (65 mg iron) tablet Take 1 Tab by mouth daily (with breakfast). Qty: 30 Tab, Refills: 0      atorvastatin (LIPITOR) 80 mg tablet Take 1 Tab by mouth nightly. Qty: 30 Tab, Refills: 0      apixaban (ELIQUIS) 2.5 mg tablet Take 1 Tab by mouth two (2) times a day. Qty: 60 Tab, Refills: 0      glipiZIDE SR (GLUCOTROL XL) 2.5 mg CR tablet Take 1 Tab by mouth daily. Qty: 30 Tab, Refills: 0         CONTINUE these medications which have CHANGED    Details   levothyroxine (SYNTHROID) 100 mcg tablet Take 1 Tab by mouth every morning. Qty: 30 Tab, Refills: 0         CONTINUE these medications which have NOT CHANGED    Details   amiodarone (CORDARONE) 200 mg tablet TAKE 1 TABLET DAILY  Qty: 90 Tab, Refills: 3      COMPOUNDMAX BASE crea 240 g by Does Not Apply route four (4) times daily.  Anti-Inflam Meloxicam 0.09% Topirmate 1% Methocarbamol 2%  Lidocaine 2% Prilocaine 2%  Qty: 240 g, Refills: 3      pantoprazole (PROTONIX) 40 mg tablet Take 1 tablet by mouth daily. Qty: 30 tablet, Refills: 1      carvedilol (COREG) 6.25 mg tablet Take 1 tablet by mouth two (2) times a day. Qty: 60 tablet, Refills: 1      triamcinolone acetonide (KENALOG) 0.1 % topical cream Apply  to affected area two (2) times a day. use thin layer  Qty: 15 g, Refills: 0      mupirocin (BACTROBAN) 2 % ointment Apply  to affected area daily. Lancets Misc Use tid  Qty: 100 Each, Refills: 11    Associated Diagnoses: CHF (congestive heart failure) ([facility]  Diabetes mellitus ([facility]         STOP taking these medications       lisinopril (PRINIVIL, ZESTRIL) 20 mg tablet Comments:   Reason for Stopping:         SITagliptin-metFORMIN (JANUMET)  mg per tablet Comments:   Reason for Stopping:         furosemide (LASIX) 40 mg tablet Comments:   Reason for Stopping:         traMADol (ULTRAM) 50 mg tablet Comments:   Reason for Stopping:         LORazepam (ATIVAN) 0.5 mg tablet Comments:   Reason for Stopping:              Current Facility-Administered Medications:     dextrose 10% infusion 125-250 mL, 125-250 mL, IntraVENous, PRN, Eugenio Phillips MD    insulin glargine (LANTUS) injection 5 Units, 5 Units, SubCUTAneous, DAILY, Beny Phillips MD, 5 Units at 07/20/20 1031    carvediloL (COREG) tablet 6.25 mg, 6.25 mg, Oral, BID WITH MEALS, Beny Phillips MD, 6.25 mg at 07/20/20 1029    apixaban (ELIQUIS) tablet 2.5 mg, 2.5 mg, Oral, BID, Meghan Meeks MD, 2.5 mg at 07/20/20 1028    thiamine HCL (B-1) tablet 100 mg, 100 mg, Oral, DAILY, Lynn Barbosa MD, 100 mg at 07/20/20 1029    ferrous sulfate tablet 325 mg, 1 Tab, Oral, DAILY WITH BREAKFAST, Lynn Barbosa MD, 325 mg at 07/20/20 1028    cholecalciferol (VITAMIN D3) capsule 5,000 Units, 5,000 Units, Oral, DAILY, Lynn Barbosa MD, 5,000 Units at 07/20/20 1028    ascorbic acid (vitamin C) (VITAMIN C) tablet 1,000 mg, 1,000 mg, Oral, BID, Alla Salomon MD, 1,000 mg at 07/20/20 1029    levothyroxine (SYNTHROID) tablet 100 mcg, 100 mcg, Oral, 6am, Ashley Laura MD, 100 mcg at 07/20/20 0546    ondansetron (ZOFRAN) injection 4 mg, 4 mg, IntraVENous, Q6H PRN, Corona Terry MD    atorvastatin (LIPITOR) tablet 80 mg, 80 mg, Oral, QHS, Wicho Hunt MD, 80 mg at 07/19/20 2111    acetaminophen (TYLENOL) tablet 650 mg, 650 mg, Oral, Q4H PRN, Corona Terry MD, 650 mg at 07/18/20 2127    labetaloL (NORMODYNE;TRANDATE) 20 mg/4 mL (5 mg/mL) injection 5 mg, 5 mg, IntraVENous, Q10MIN PRN, Corona Terry MD    senna-docusate (PERICOLACE) 8.6-50 mg per tablet 2 Tab, 2 Tab, Oral, QHS, Corona Terry MD, Stopped at 07/19/20 2200    insulin lispro (HUMALOG) injection, , SubCUTAneous, TIDAC, Alan, Lillie Pelletier MD, 6 Units at 07/20/20 1031    glucose chewable tablet 16 g, 4 Tab, Oral, PRN, Corona Terry MD    glucagon (GLUCAGEN) injection 1 mg, 1 mg, IntraMUSCular, PRN, Corona Terry MD    erythromycin (ILOTYCIN) 5 mg/gram (0.5 %) ophthalmic ointment, , Both Eyes, Q4H, Matthew Jimenez MD    famotidine (PEPCID) tablet 20 mg, 20 mg, Oral, DAILY, Wicho Hunt MD, 20 mg at 07/20/20 1028    vits A and D-white pet-lanolin (A&D) ointment, , Topical, BID, Corona Terry MD    LORazepam (ATIVAN) injection 1 mg, 1 mg, IntraVENous, Q2H PRN, Alla Salomon MD, 1 mg at 07/19/20 2112    sodium chloride (NS) flush 5-10 mL, 5-10 mL, IntraVENous, PRN, Corona Terry MD  · It is important that you take the medication exactly as they are prescribed. · Keep your medication in the bottles provided by the pharmacist and keep a list of the medication names, dosages, and times to be taken in your wallet. · Do not take other medications without consulting your doctor.      Discharge To: SNF    Minutes spent on discharge: 55 minutes spent coordinating this discharge (review instructions/follow-up, prescriptions, preparing report for sign off)    Babar Torres, MD  7/20/2020 11:21 AM

## 2020-07-20 NOTE — PROGRESS NOTES
[facility] ready to accept pt today. Informed pt's sister Andrea Clark that pt is being transferred to [facility] today.   Left a message for [facility]  Transport set with [email or web address] for 3pm    1420:   Michelle Wilson from [employer] called and pushed  time to KIM ArnoldN RN  Care Management  Pager: [phone]

## 2020-07-20 NOTE — DISCHARGE INSTRUCTIONS
Patient armband removed and shredded  MyChart Activation    Thank you for requesting access to [employer]. Please follow the instructions below to securely access and download your online medical record. [employer] allows you to send messages to your doctor, view your test results, renew your prescriptions, schedule appointments, and more. How Do I Sign Up? 1. In your internet browser, go to [email or web address]  2. Click on the First Time User? Click Here link in the Sign In box. You will be redirect to the New Member Sign Up page. 3. Enter your [employer] Access Code exactly as it appears below. You will not need to use this code after youve completed the sign-up process. If you do not sign up before the expiration date, you must request a new code. [employer] Access Code: [email or web address]  Expires: 2020  1:02 PM (This is the date your [employer] access code will )    4. Enter the last four digits of your Social Security Number (xxxx) and Date of Birth (mm/dd/yyyy) as indicated and click Submit. You will be taken to the next sign-up page. 5. Create a [employer] ID. This will be your [employer] login ID and cannot be changed, so think of one that is secure and easy to remember. 6. Create a [employer] password. You can change your password at any time. 7. Enter your Password Reset Question and Answer. This can be used at a later time if you forget your password. 8. Enter your e-mail address. You will receive e-mail notification when new information is available in [address]. 9. Click Sign Up. You can now view and download portions of your medical record. 10. Click the Download Summary menu link to download a portable copy of your medical information. Additional Information    If you have questions, please visit the Frequently Asked Questions section of the [employer] website at [email or web address] Remember, [employer] is NOT to be used for urgent needs.  For medical emergencies, dial 911.      As part of the discharge instructions, medications already given today were discussed with the patient. The next dose due of all ordered meds was highlighted as part of the medication discharge instructions. Discussed with the patient the importance of taking medications as directed, as well as the side effects and adverse reactions to medications ordered. DISCHARGE SUMMARY from Nurse    PATIENT INSTRUCTIONS:    After general anesthesia or intravenous sedation, for 24 hours or while taking prescription Narcotics:  · Limit your activities  · Do not drive and operate hazardous machinery  · Do not make important personal or business decisions  · Do  not drink alcoholic beverages  · If you have not urinated within 8 hours after discharge, please contact your surgeon on call. Report the following to your surgeon:  · Excessive pain, swelling, redness or odor of or around the surgical area  · Temperature over 100.5  · Nausea and vomiting lasting longer than 4 hours or if unable to take medications  · Any signs of decreased circulation or nerve impairment to extremity: change in color, persistent  numbness, tingling, coldness or increase pain  · Any questions    What to do at Home:  Recommended activity: Activity as tolerated,       If you experience any of the following symptoms shortness of breath, chest pain, increased swelling, temp 101 or above, please follow up with Emergency Department or Primary Md. *  Please give a list of your current medications to your Primary Care Provider. *  Please update this list whenever your medications are discontinued, doses are      changed, or new medications (including over-the-counter products) are added. *  Please carry medication information at all times in case of emergency situations.     These are general instructions for a healthy lifestyle:    No smoking/ No tobacco products/ Avoid exposure to second hand smoke  Surgeon General's Warning:  Quitting smoking now greatly reduces serious risk to your health. Obesity, smoking, and sedentary lifestyle greatly increases your risk for illness    A healthy diet, regular physical exercise & weight monitoring are important for maintaining a healthy lifestyle    You may be retaining fluid if you have a history of heart failure or if you experience any of the following symptoms:  Weight gain of 3 pounds or more overnight or 5 pounds in a week, increased swelling in our hands or feet or shortness of breath while lying flat in bed. Please call your doctor as soon as you notice any of these symptoms; do not wait until your next office visit. The discharge information has been reviewed with the patient. The patient verbalized understanding. Discharge medications reviewed with the patient and appropriate educational materials and side effects teaching were provided.   ___________________________________________________________________________________________________________________________________

## 2020-07-28 NOTE — PROGRESS NOTES
Problem: Falls - Risk of 
Goal: *Absence of Falls Description: Document Tressia Bullion Fall Risk and appropriate interventions in the flowsheet. Outcome: Progressing Towards Goal 
Note: Fall Risk Interventions: 
  
 
Mentation Interventions: Bed/chair exit alarm Medication Interventions: Bed/chair exit alarm Elimination Interventions: Bed/chair exit alarm, Call light in reach History of Falls Interventions: Bed/chair exit alarm Problem: Pressure Injury - Risk of 
Goal: *Prevention of pressure injury Description: Document Ryan Scale and appropriate interventions in the flowsheet. Outcome: Progressing Towards Goal 
Note: Pressure Injury Interventions: 
Sensory Interventions: Assess changes in LOC Moisture Interventions: Absorbent underpads Activity Interventions: Pressure redistribution bed/mattress(bed type) Mobility Interventions: Pressure redistribution bed/mattress (bed type) Nutrition Interventions: Document food/fluid/supplement intake Friction and Shear Interventions: Foam dressings/transparent film/skin sealants Authored by Resident/PA/NP

## 2020-08-25 PROBLEM — I50.9 ACUTE EXACERBATION OF CHF (CONGESTIVE HEART FAILURE) (HCC): Status: ACTIVE | Noted: 2020-01-01

## 2020-08-25 PROBLEM — I50.9 CHF (CONGESTIVE HEART FAILURE) (HCC): Status: ACTIVE | Noted: 2020-01-01

## 2020-08-25 PROBLEM — J96.01 ACUTE HYPOXEMIC RESPIRATORY FAILURE (HCC): Status: ACTIVE | Noted: 2020-01-01

## 2020-08-25 NOTE — ANESTHESIA PREPROCEDURE EVALUATION
Relevant Problems No relevant active problems Anesthetic History Review of Systems / Medical History Patient summary reviewed, nursing notes reviewed and pertinent labs reviewed Pulmonary Neuro/Psych Cardiovascular Exercise tolerance: <4 METS 
  
GI/Hepatic/Renal 
  
 
 
 
 
 
 Endo/Other Other Findings Physical Exam 
 
Airway Mallampati: II 
TM Distance: 4 - 6 cm Neck ROM: normal range of motion Mouth opening: Normal 
 
 Cardiovascular Rhythm: irregular Rate: abnormal 
 
 
 
 Dental 
 
Dentition: Poor dentition Pulmonary Decreased breath sounds: bilateral 
 
 
 
 
 Abdominal 
GI exam deferred Other Findings Anesthetic Plan ASA: 4, emergent Anesthesia type: MAC Induction: Intravenous Anesthetic plan and risks discussed with: Patient and Family Attempted to call family for consent   No success   Deemed emergency via ER attending   Patient taken to CT

## 2020-08-25 NOTE — ANESTHESIA POSTPROCEDURE EVALUATION
* No procedures listed *. MAC Anesthesia Post Evaluation Multimodal analgesia: multimodal analgesia used between 6 hours prior to anesthesia start to PACU discharge Patient location during evaluation: bedside Patient participation: complete - patient participated Level of consciousness: awake Pain score: 0 Pain management: adequate Airway patency: patent Anesthetic complications: no 
Cardiovascular status: stable Respiratory status: acceptable Hydration status: acceptable Post anesthesia nausea and vomiting:  controlled Final Post Anesthesia Temperature Assessment:  Normothermia (36.0-37.5 degrees C) INITIAL Post-op Vital signs:  
Vitals Value Taken Time /67 8/25/2020  4:29 PM  
Temp 36.8 °C (98.3 °F) 8/25/2020  4:29 PM  
Pulse 122 8/25/2020  4:30 PM  
Resp 24 8/25/2020  4:30 PM  
SpO2 97 % 8/25/2020  4:30 PM  
Vitals shown include unvalidated device data.

## 2020-08-25 NOTE — PROGRESS NOTES
Pt screaming loudly, bucking on CT table and hitting machine with fists. Unable to complete scan at this time. MD Georges Owens made aware.

## 2020-08-25 NOTE — ED TRIAGE NOTES
The patient presents from [facility] for evaluation of a DVT to her left arm and shortness of breath.

## 2020-08-25 NOTE — ED PROVIDER NOTES
EMERGENCY DEPARTMENT HISTORY AND PHYSICAL EXAM 
 
 
Date: 8/25/2020 Patient Name: Nellie Brennan History of Presenting Illness Chief Complaint Patient presents with  Blood Clot History (Context): Nellie Brennan is a 71 y.o. woman with congestive heart failure, CKD, and a complicated set of active comorbid conditions as noted below in the past medical history, who presents with subacute onset, severe, constant dyspnea associated with left arm swelling. This started several days ago. The patient denies any exacerbating/rating features. The patient is a very poor historian, and the history was gathered mostly from EMS and family. The patient can answer yes or no questions, and so the patient completed a review of systems although likely unreliable. On review of systems, the patient denies chest pain, back pain, leg swelling, leg pain, recent travel, fever, chills, trauma, back pain, abdominal pain, nausea, vomiting, diaphoresis. PCP: Dorothy Lopez MD 
 
Current Outpatient Medications Medication Sig Dispense Refill  apixaban (ELIQUIS) 5 mg tablet Take 1 Tab by mouth two (2) times a day. 60 Tab 0  
 acetaminophen (TYLENOL) 325 mg tablet Take 2 Tabs by mouth every six (6) hours as needed for Pain or Fever. 30 Tab 0  
 polyethylene glycol (MIRALAX) 17 gram packet Take 1 Packet by mouth daily as needed for Constipation. 15 Packet 0  
 insulin lispro (HUMALOG) 100 unit/mL injection Check FSBS AC*HS For sugars between 160 and 200- Give 2 units SQ, For sugars between 201 and 250, Give 3 units SQ, For sugars Between 251 and 300, give 5 units SQ, For Sugars between 301 and 350, give 7 units SQ For sugars between 351 and 400, give 8 units SQ and contact PCP 1 Vial 0  
 insulin glargine (Lantus U-100 Insulin) 100 unit/mL injection 4 Units by SubCUTAneous route nightly.  Hold if fingerstick blood sugar is less than 140 1 Vial 0  
  furosemide (Lasix) 20 mg tablet Take 1 Tab by mouth daily. Start from Sunday, August 30, 2020 30 Tab 0  
 senna-docusate (PERICOLACE) 8.6-50 mg per tablet Take 2 Tabs by mouth nightly. 30 Tab 0  
 ferrous sulfate 325 mg (65 mg iron) tablet Take 1 Tab by mouth daily (with breakfast). 30 Tab 0  
 atorvastatin (LIPITOR) 80 mg tablet Take 1 Tab by mouth nightly. 30 Tab 0  
 levothyroxine (SYNTHROID) 100 mcg tablet Take 1 Tab by mouth every morning. 30 Tab 0  
 pantoprazole (PROTONIX) 40 mg tablet Take 1 tablet by mouth daily. 30 tablet 1  carvedilol (COREG) 6.25 mg tablet Take 1 tablet by mouth two (2) times a day. 60 tablet 1 Past History Past Medical History: 
Past Medical History:  
Diagnosis Date  Cardiac echocardiogram 02/09/2015 EF 64%. No WMA. Mild LVH. Gr 2 DDfx. RVSP 30 mmHg. Mod-severe LAE. No significant valvular heart disease.  Cardiac nuclear imaging test, abnormal 02/09/2015 LV apical defect likely c/w prior infarction w/sm area of distal anterior romulo-infarct ischemia. EF 57%. Apical WMA, likely from prior infarct. Nondiagnostic EKG on pharm stress test.  
 Chronic kidney disease, stage III (moderate) ([address] 2/6/2012  Chronic renal disease, stage III ([address]  Congestive heart failure ([address] 2/6/2012  Congestive heart failure, unspecified 01/2012 Echocardiogram with severe global hypokinesis and EF 30% consistent with dilated cardiomyopathy.  DDD (degenerative disc disease), lumbar 9/10/2016  Diabetes mellitus ([address] 01/2015 Presented with blood sugar of 637 and HbA1c 13.4  Diabetes mellitus with kidney complication ([address] 2/9/3268  Dilated cardiomyopathy ([address] 2/6/2012  Hypertension  Hypothyroidism  Iron deficiency anemia 01/2015 Presented with Hb 5.1/ Hct 18.0. On Xarelto. No obvious source of bleeding. Did not follow-up for outpatient GI evaluation.  Low back pain 01/21/2015 Abdominal CT scan with thoracolumbar rotoscoliosis with severe multilevel degenerative disc disease and facet arthropathy; central canal stenosis at L4-L5.  Mental retardation 2/6/2012  Paroxysmal atrial fibrillation ([address] 01/2012 Presented with atrial fibrillation with RVR; converted spontaneously to sinus rhythm during hospitalization and subsequently started on amiodarone and Xarelto. Past Surgical History: 
History reviewed. No pertinent surgical history. Family History: 
History reviewed. No pertinent family history. Social History: 
Social History Tobacco Use  Smoking status: Never Smoker  Smokeless tobacco: Never Used Substance Use Topics  Alcohol use: No  
 Drug use: No  
 
 
Allergies: 
No Known Allergies PMH, PSH, family history, social history, allergies reviewed with the patient with significant items noted above. Review of Systems As per HPI, otherwise reviewed and negative. Physical Exam  
 
Vitals reviewed. See flow sheets for details Gen: In mild to moderate respiratory distress HEENT: Normocephalic, sclera anicteric Cardiovascular: Normal rate, regular rhythm, no murmurs, rubs, gallops. Pulses intact and equal distally. Pulmonary: Mild respiratory distress. Tachypneic. No stridor. Bibasilar crackles ABD: Soft, nontender, nondistended. Neuro: Alert. Altered mentation. Somewhat confused. Psych: Paucity of speech. Difficult to evaluate, but does not have normal conversational English at this time. : No CVA tenderness EXT: Left arm edema. Moves all extremities well. No cyanosis or clubbing. Skin: Warm and well-perfused. Diagnostic Study Results Labs - No results found for this or any previous visit (from the past 12 hour(s)). Radiologic Studies -  
CTA CHEST W OR W WO CONT Final Result IMPRESSION:  
Limited assessment for pulmonary embolus particularly in the left lower lobe area within this limitation, no definite pulmonary embolus to the proximal  
segmental pulmonary artery level. Moderate bilateral pleural effusions, right greater than left. Probable  
pulmonary edema. Superimposed pneumonia is not entirely excluded. Mild nonspecific mediastinal adenopathy. Diffuse anasarca. XR CHEST PORT Final Result Impression: 1. Cardiomegaly with interstitial airspace disease and more focal consolidation  
in the lung bases and right midlung. Findings could be interstitial edema  
although pneumonia is within the differential. The right hilum is enlarged and  
has been chronically present to a lesser extent as far distant as February 2012. This suggests a benign process such as pulmonary artery enlargement. CT Results  (Last 48 hours) None CXR Results  (Last 48 hours) None Medical Decision Making I am the first provider for this patient. I reviewed the vital signs, available nursing notes, past medical history, past surgical history, family history and social history. Vital Signs-Reviewed the patient's vital signs. EKG: Interpreted by myself. Records Reviewed: Personally, on initial evaluation MDM:  
Patient presents with dyspnea, which is associated with left arm swelling. Exam significant for left arm swelling, mild respiratory distress, bibasilar crackles. DDX considered: Pneumothorax, pneumonia, COPD exacerbation, CHF, ACS, DVT, PE 
DDX thought to be less likely but also considered due to high risk condition: Anxiety, aortic dissection, PE, Boerhaave's, pericarditis, mediastinitis Patient condition on initial evaluation: Moderately ill Plan:  
Oxygen therapy per protocol Close Observation Cardiac monitoring As per orders noted below: 
Orders Placed This Encounter  ANTICOAGULANT THERAPY IS CONTRAINDICATED Other, please document Patient is on Eliquis  XR CHEST PORT  
  CTA CHEST W OR W WO CONT  CBC WITH AUTOMATED DIFF  
 PROTHROMBIN TIME + INR  
 PTT  PRO-BNP  TROPONIN I  
 BLOOD GAS, ARTERIAL  SARS-COV-2  
 EKG, 12 LEAD, INITIAL  iopamidoL (ISOVUE-370) 76 % injection  mL  furosemide (LASIX) injection 80 mg  . Height  INITIAL PHYSICIAN ORDER: INPATIENT Telemetry; 3. Patient receiving treatment that can only be provided in an inpatient setting (further clarification in H&P documentation) ED Course:  
ED Course as of Sep 17 1315 Tue Aug 25, 2020  
1539 I have consulted anesthesia for sedation in the setting of ED crowding. Anesthesia will sedate the patient for CT scan. [DT] ED Course User Index 
[DT] Lonzo Fothergill, MD  
 
Anesthesia sedated the patient for CT scan. No PE was noted. Patient was admitted to the hospitalist.  Patient diuresed. Patient treated for community-acquired pneumonia with ceftriaxone and azithromycin. Coronavirus study pending. Patient condition at time of disposition: Moderately ill Diagnosis Clinical Impression: 1. Community acquired pneumonia, unspecified laterality 2. Goals of care, counseling/discussion 3. Acute on chronic congestive heart failure, unspecified heart failure type ([address] 4. Intellectual disability 5. Debility Signed, Ashley Smith MD 
Emergency Physician 
[facility] As a voice dictation software was utilized to dictate this note, minor word transpositions can occur. I apologize for confusing wording and typographic errors. Please feel free to contact me for clarification.

## 2020-08-26 NOTE — PROGRESS NOTES
[facility] Hospitalist Group Progress Note Patient: Yolanda Veliz Age: 71 y.o. : 1955 MR#: [id number] SSN: [id number] Date/Time: 2020 Subjective:  
 
Patient is sitting in bed in no apparent distress awake. denies chest pain or shortness of breath Assessment/Plan:  
 
Acute on chronic systolic congestive heart failure exacerbation Possible pneumonia Suspected COVID-19 infection Dilated cardiomyopathy EF of 25% Chronic kidney disease stage III Atrial fibrillation on Eliquis Hypothyroidism Type 2 diabetes Hypertension History of CVA Intellectual disability as per the records Dysphagia PLAN Continue IV Lasix, cardiology is following, I's and O's 
Monitor renal function Continue empiric antibiotics, follow cultures COVID-19 test result is pending, continue droplet plus precautions On Eliquis Monitor sugars Diet as per speech PT OT Discussed with patient I also called and discussed with patient's Sister John Canales who states that she is the next of kin at phone #[phone]. I discussed regarding patient's medical care with John Canales. I also broached regarding level of care and at this time John Canales is considering but wishes for patient to be full code for now. Palliative care has been consulted Case discussed with:  []Patient  []Family  []Nursing  []Case Management DVT Prophylaxis:  []Lovenox  []Hep SQ  []SCDs  []Coumadin   []On Heparin gtt Objective:  
VS:  
Visit Vitals BP (!) 117/96 Pulse 94 Temp 97.5 °F (36.4 °C) Resp 13 SpO2 97% Tmax/24hrs: Temp (24hrs), Av.5 °F (36.4 °C), Min:97.5 °F (36.4 °C), Max:97.5 °F (36.4 °C) Input/Output: No intake or output data in the 24 hours ending 20 General:  Awake, follows simple commands Cardiovascular:  S1S2+, RRR Pulmonary: Coarse breath sounds bilaterally GI:  Soft, BS+, NT, ND Extremities:  + edema Labs:   
Recent Results (from the past 24 hour(s)) CARDIAC PANEL,(CK, CKMB & TROPONIN) Collection Time: 08/25/20 11:58 PM  
Result Value Ref Range CK - MB 2.3 <3.6 ng/ml CK-MB Index 5.5 (H) 0.0 - 4.0 % CK 42 26 - 192 U/L Troponin-I, QT 0.05 (H) 0.0 - 0.045 NG/ML  
SARS-COV-2 Collection Time: 08/26/20  1:13 AM  
Result Value Ref Range SARS-CoV-2 PENDING Specimen source Nasopharyngeal    
 Specimen type NP Swab CBC WITH AUTOMATED DIFF Collection Time: 08/26/20  3:30 AM  
Result Value Ref Range WBC 8.1 4.6 - 13.2 K/uL  
 RBC 4.08 (L) 4.20 - 5.30 M/uL  
 HGB 13.4 12.0 - 16.0 g/dL HCT 40.7 35.0 - 45.0 % MCV 99.8 (H) 74.0 - 97.0 FL  
 MCH 32.8 24.0 - 34.0 PG  
 MCHC 32.9 31.0 - 37.0 g/dL  
 RDW 15.7 (H) 11.6 - 14.5 % PLATELET 190 920 - 906 K/uL MPV 11.2 9.2 - 11.8 FL  
 NEUTROPHILS 76 (H) 40 - 73 % LYMPHOCYTES 14 (L) 21 - 52 % MONOCYTES 10 3 - 10 % EOSINOPHILS 0 0 - 5 % BASOPHILS 0 0 - 2 %  
 ABS. NEUTROPHILS 6.1 1.8 - 8.0 K/UL  
 ABS. LYMPHOCYTES 1.1 0.9 - 3.6 K/UL  
 ABS. MONOCYTES 0.8 0.05 - 1.2 K/UL  
 ABS. EOSINOPHILS 0.0 0.0 - 0.4 K/UL  
 ABS. BASOPHILS 0.0 0.0 - 0.1 K/UL  
 DF AUTOMATED METABOLIC PANEL, COMPREHENSIVE Collection Time: 08/26/20  3:30 AM  
Result Value Ref Range Sodium 139 136 - 145 mmol/L Potassium 4.1 3.5 - 5.5 mmol/L Chloride 105 100 - 111 mmol/L  
 CO2 29 21 - 32 mmol/L Anion gap 5 3.0 - 18 mmol/L Glucose 204 (H) 74 - 99 mg/dL BUN 29 (H) 7.0 - 18 MG/DL Creatinine 1.33 (H) 0.6 - 1.3 MG/DL  
 BUN/Creatinine ratio 22 (H) 12 - 20 GFR est AA 49 (L) >60 ml/min/1.73m2 GFR est non-AA 40 (L) >60 ml/min/1.73m2 Calcium 8.2 (L) 8.5 - 10.1 MG/DL Bilirubin, total 0.3 0.2 - 1.0 MG/DL  
 ALT (SGPT) 38 13 - 56 U/L  
 AST (SGOT) 22 10 - 38 U/L Alk. phosphatase 138 (H) 45 - 117 U/L Protein, total 5.7 (L) 6.4 - 8.2 g/dL Albumin 2.5 (L) 3.4 - 5.0 g/dL Globulin 3.2 2.0 - 4.0 g/dL A-G Ratio 0.8 0.8 - 1.7 MAGNESIUM  
 Collection Time: 08/26/20  3:30 AM  
Result Value Ref Range Magnesium 1.7 1.6 - 2.6 mg/dL PROTHROMBIN TIME + INR Collection Time: 08/26/20  3:30 AM  
Result Value Ref Range Prothrombin time 15.4 (H) 11.5 - 15.2 sec INR 1.2 0.8 - 1.2 CARDIAC PANEL,(CK, CKMB & TROPONIN) Collection Time: 08/26/20  3:30 AM  
Result Value Ref Range CK - MB 2.9 <3.6 ng/ml CK-MB Index 6.7 (H) 0.0 - 4.0 % CK 43 26 - 192 U/L Troponin-I, QT 0.05 (H) 0.0 - 0.045 NG/ML  
HEMOGLOBIN A1C WITH EAG Collection Time: 08/26/20  3:30 AM  
Result Value Ref Range Hemoglobin A1c 7.8 (H) 4.2 - 5.6 % Est. average glucose 177 mg/dL GLUCOSE, POC Collection Time: 08/26/20  8:04 AM  
Result Value Ref Range Glucose (POC) 175 (H) 70 - 110 mg/dL GLUCOSE, POC Collection Time: 08/26/20 11:33 AM  
Result Value Ref Range Glucose (POC) 147 (H) 70 - 110 mg/dL GLUCOSE, POC Collection Time: 08/26/20  4:43 PM  
Result Value Ref Range Glucose (POC) 163 (H) 70 - 110 mg/dL Additional Data Reviewed:   
 
Signed By: Louis Peterson MD   
 August 26, 2020

## 2020-08-26 NOTE — PROGRESS NOTES
Problem: Dysphagia (Adult) Goal: *Acute Goals and Plan of Care (Insert Text) Description: Patient will: 1. Tolerate PO trials with 0 s/s overt distress in 4/5 trials 2. Utilize compensatory swallow strategies/maneuvers (decrease bite/sip, size/rate, alt. liq/sol) with min cues in 4/5 trials 3. Perform oral-motor/laryngeal exercises to increase oropharyngeal swallow function with min cues 4. Complete an objective swallow study (i.e., MBSS) to assess swallow integrity, r/o aspiration, and determine of safest LRD, min A as indicated/ordered by MD  
 
Recommend:  
Puree, thin liquids Meds in puree Feeding assistance as needed Aspiration precautions HOB >45 degrees during all intake and for at least 30 min after po Slow rate of intake, Small bites/sips Oral care three times daily Outcome: Progressing Towards Goal 
  
SPEECH LANGUAGE PATHOLOGY BEDSIDE SWALLOW EVALUATION/TREATMENT Patient: Moncho Jolly (2 y.o. female) Date: 8/26/2020 Primary Diagnosis: Acute exacerbation of CHF (congestive heart failure) ([facility] [I50.9] Acute hypoxemic respiratory failure ([facility] [J96.01] CHF (congestive heart failure) ([facility] [I50.9] Precautions: Aspiration PLOF: Unknown; last recommended puree, thin liquids at this facility ASSESSMENT : 
Based on the objective data described below, the patient presents with mod oral and suspected mild pharyngeal dysphagia. Pt alert but confused, intermittently tearful/agitated. Oral mech exam revealed macroglossia and decreased orolingual strength/control. Pt with delayed/discoordinated bolus manipulation across po. Demo adequate clearance and no overt distress with puree presentations. Unable to effectively/efficiently manipulate and clear mech soft despite multiple facilitated liquid washes.   Tolerating thin liquids with no cough/throat clear; however, demo x1 instance of drop in SPO2 (from 98 to 94 with multiple swallows of thin liquids). Recommend continue puree diet with thin liquids with strict use of the above mentioned compensatory strategies/aspiration precautions. Will follow x1-2 visits to ensure continued diet tolerance. Patient will benefit from skilled intervention to address the above impairments. Patient's rehabilitation potential is considered to be Fair Factors which may influence rehabilitation potential include:  
[]            None noted [x]            Mental ability/status [x]            Medical condition []            Home/family situation and support systems [x]            Safety awareness 
[]            Pain tolerance/management []            Other: PLAN : 
Recommendations and Planned Interventions: As above Frequency/Duration: Patient will be followed by speech-language pathology x1-2 visits to address goals. Discharge Recommendations: To Be Determined SUBJECTIVE:  
Patient stated But when will later be? OBJECTIVE:  
 
Past Medical History:  
Diagnosis Date Cardiac echocardiogram 02/09/2015 EF 64%. No WMA. Mild LVH. Gr 2 DDfx. RVSP 30 mmHg. Mod-severe LAE. No significant valvular heart disease. Cardiac nuclear imaging test, abnormal 02/09/2015 LV apical defect likely c/w prior infarction w/sm area of distal anterior romulo-infarct ischemia. EF 57%. Apical WMA, likely from prior infarct. Nondiagnostic EKG on pharm stress test.  
 Chronic kidney disease, stage III (moderate) ([address] 2/6/2012 Chronic renal disease, stage III ([address] Congestive heart failure ([address] 2/6/2012 Congestive heart failure, unspecified 01/2012 Echocardiogram with severe global hypokinesis and EF 30% consistent with dilated cardiomyopathy. DDD (degenerative disc disease), lumbar 9/10/2016 Diabetes mellitus ([address] 01/2015 Presented with blood sugar of 637 and HbA1c 13.4 Diabetes mellitus with kidney complication ([address] 9/9/3374 Dilated cardiomyopathy ([address] 2/6/2012 Hypertension Hypothyroidism Iron deficiency anemia 01/2015 Presented with Hb 5.1/ Hct 18.0. On Xarelto. No obvious source of bleeding. Did not follow-up for outpatient GI evaluation. Low back pain 01/21/2015 Abdominal CT scan with thoracolumbar rotoscoliosis with severe multilevel degenerative disc disease and facet arthropathy; central canal stenosis at L4-L5. Mental retardation 2/6/2012 Paroxysmal atrial fibrillation ([address] 01/2012 Presented with atrial fibrillation with RVR; converted spontaneously to sinus rhythm during hospitalization and subsequently started on amiodarone and Xarelto. History reviewed. No pertinent surgical history. Prior Level of Function/Home Situation: Unknown Diet prior to admission: Unknown; last recommended puree, thin liquids at this facility Current Diet:  Puree, thin liquids Cognitive and Communication Status: 
Neurologic State: Alert, Confused Orientation Level: Oriented to person Cognition: Follows commands Oral Assessment: 
Oral Assessment Labial: No impairment Dentition: Natural;Limited Oral Hygiene: Good Lingual: Macroglossia; Incoordinated;Decreased strength;Decreased rate Velum: No impairment Mandible: No impairment P.O. Trials: 
Patient Position: 45 at [facility] Vocal quality prior to P.O.: No impairment Consistency Presented: Thin liquid;Puree;Mechanical soft How Presented: SLP-fed/presented;Straw;Successive swallows;Spoon Bolus Acceptance: No impairment Bolus Formation/Control: Impaired Type of Impairment: Delayed;Poor;Posterior;Mastication Propulsion: Delayed (# of seconds); Discoordination Oral Residue: 10-50% of bolus; Lingual(With mech soft ) Initiation of Swallow: No impairment Laryngeal Elevation: Functional 
Aspiration Signs/Symptoms: None Pharyngeal Phase Characteristics: Poor endurance Effective Modifications: Small sips and bites; Alternate liquids/solids Cues for Modifications: Maximal 
Oral Phase Severity: Moderate Pharyngeal Phase Severity : Mild PAIN: 
Start of Eval: not reported End of Eval: not reported After treatment:  
[]            Patient left in no apparent distress sitting up in chair 
[x]            Patient left in no apparent distress in bed 
[x]            Call bell left within reach [x]            Nursing notified []            Family present 
[]            Caregiver present 
[]            Bed alarm activated COMMUNICATION/EDUCATION:  
[x]            Aspiration precautions; swallow safety; compensatory techniques. []            Patient/family have participated as able in goal setting and plan of care. []            Patient/family agree to work toward stated goals and plan of care. []            Patient understands intent and goals of therapy; neutral about participation. [x]            Patient unable to participate in goal setting/plan of care; educ ongoing with interdisciplinary staff 
[]         Posted safety precautions in patient's room. Thank you for this referral, Melly Gutierrez M.S., CCC-SLP Speech-Language Pathologist

## 2020-08-26 NOTE — PROGRESS NOTES
Reason for Admission:  Acute exacerbation of CHF (congestive heart failure) ([facility] [address] [I50.9] Acute hypoxemic respiratory failure ([facility] [address] [J96.01] CHF (congestive heart failure) ([facility] [address] [I50.9] RUR Score:   22% Plan for utilizing home health:   Patient is LTC Likelihood of Readmission:   Moderate Do you (patient/family) have any concerns for transition/discharge?  no 
 
Transition of Care Plan:    
 
Initial assessment completed with sister Doyle via phone. Cognitive status of patient: Unable to assess at this time. Patient in isolation room. Face sheet information confirmed:  yes. sister Doyle ([phone]) provided  needed information. This patient is longterm at [facility] .  Prior to hospitalization, patient was considered to be independent with ADLs/IADLS : no . If not independent,  patient needs assist with : dressing, bathing, food preparation, toileting and grooming Patient has a current ACP document on file: no The patient will need medicaid tranportation upon discharge. The patient already has [address] equipment available in the home. This patient is on dialysis : no 
 
Currently, the discharge plan is LTC. Per Kiara Clement is for patient to return to [facility]. Aniyah Nazario stated \" They told me that she could return and her bed will be saved. \" 
Writer called Rk Beaulieu with admissions to verify that patient can return. Waiting for a return call. CM will continue to assist with transitional needs. Patient's current insurance is [employer] Care Management Interventions PCP Verified by CM: Yes Mode of Transport at Discharge: BLS Transition of Care Consult (CM Consult): Discharge Planning Discharge Durable Medical Equipment: No 
Physical Therapy Consult: No 
Occupational Therapy Consult: No 
Speech Therapy Consult:  Yes 
 Current Support Network: [address] [place], [place] [address] Discharge Location Discharge Placement: [address] [facility] KIM AndersonN, RN Pager # [phone] Care Manager

## 2020-08-26 NOTE — CONSULTS
[facility] - Consult Note Consultation request by Vida Ken MD for advice/opinion related to evaluating Acute exacerbation of CHF (congestive heart failure) ([facility] [I50.9] Acute hypoxemic respiratory failure ([facility] [facility] [J96.01] CHF (congestive heart failure) ([facility] [I50.9] Date of  Admission: 8/25/2020 12:23 PM  
Primary Care Physician:  Eugenio Case MD 
 
 Assessment:  
 
Patient Active Problem List  
Diagnosis Code  Diabetes mellitus with kidney complication ([facility]) X02.10  
 Paroxysmal atrial fibrillation ([facility]) I48.0  Congestive heart failure ([facility]) I50.9  Mental retardation F79  Chronic kidney disease, stage III (moderate) ([facility]) N18.3  Dilated cardiomyopathy ([facility]) I42.0  Scoliosis M41.9  Hypertension I10  
 Hypothyroidism E03.9  Iron deficiency anemia, severe requiring transfusions 1/2015 D50.0  Advance directive declined by patient Z68.5  DDD (degenerative disc disease), lumbar M51.36  
 Paroxysmal atrial fibrillation with RVR ([facility]) I48.0  CVA (cerebral vascular accident) ([facility] I63.9  VALENTINA (acute kidney injury) ([facility] [facility] N17.9  Fall W19. Earlean Buster  Conjunctivitis of both eyes H10.9  Hypothyroid E03.9  Cellulitis L03.90  Labia enlarged N90.60  Elevated troponin R79.89  
 Acute exacerbation of CHF (congestive heart failure) ([facility]) I50.9  Acute hypoxemic respiratory failure ([facility]) J96.01  
 CHF (congestive heart failure) ([facility]) I50.9  
 
  
-Respiratory distress requiring oxygen. Presented from nursing home with SOB. Suspect more heart failure rather than pneumonia. 
-Acute on chronic HFrEF. 
-Possible pneumonia, on abx, covid testing pending. 
-Moderate bilateral pleural effusions by CTA. -Dilated CMY presumed due to hypertensive heart disease. Ejection fraction in the 25% range on echocardiogram 7/2020 with four-chamber dilatation conservatively managed in setting of comorbidities.  (EF 30% 2012 improved to 64% 2015. Nuclear stress 2015 without significant ischemia). -Paroxysmal atrial fibrillation/flutter. which now appears persistent. Rates mildly elevated initially, now better. Recently resumed Coreg for rate control and started on Eliquis for anticoagulation. -H/o recent CVA. -H/o GIB while on Xarelto. -Hypertension, stable. -Diabetes mellitus, hgbA1c 7.8. 
-Chronic kidney disease, stable. -Hypoalbuminemia, continue nutritional support. 
-Hypothyroidism, on synthroid. -Mental retardation. Recently placed in SNF. Remotely followed by Dr. Melisa Ramirez 2015. Recently evaluated by [facility] during admission for CVA 7/2020. 
      
 
 Plan:  
 
Would diurese with IV lasix as renal function and hemodynamics allow. Strict I's and O's and daily weights. Would continue pulmonary treatment and support. Would continue abx, Covid testing pending. Would resume coreg for carine control. Amiodarone was to be discontinued last admission as plan was to focus on rate control. Patient is continued on Eliquis, had dose this AM. Patient historically not on ace or arb given VALENTINA with hyperkalemia. Would recommend conservative cardiac management in setting of comorbidities. History of Present Illness: This is a 71 y.o. female admitted for Acute exacerbation of CHF (congestive heart failure) ([facility] [I50.9] Acute hypoxemic respiratory failure ([facility] [J96.01] CHF (congestive heart failure) ([facility] [address] [I50.9]. Patient complains of:  SOB. Patient is poor historian due to mental retardation. Patient presented from nursing home for SOB and to rule out upper extremity DVT. Patient required oxygen. Patient was noted to have afib with mildly elevated rates. Patient had LUE doppler which ruled out DVT and CTA which ruled out PE.  Patient however has concerns of bilateral pleural effusions, possible CHF versus pneumonia on CTA for which patient had lasix 80 IV last night along with abx and covid testing (pending at this time). Patient is currently without complaints. HR 90s-low 100s with stable BP. Cardiac risk factors: diabetes mellitus, hypertension Review of Symptoms:  Unable to provide meaningful history due to mental retardation. Past Medical History:  
 
Past Medical History:  
Diagnosis Date  Cardiac echocardiogram 02/09/2015 EF 64%. No WMA. Mild LVH. Gr 2 DDfx. RVSP 30 mmHg. Mod-severe LAE. No significant valvular heart disease.  Cardiac nuclear imaging test, abnormal 02/09/2015 LV apical defect likely c/w prior infarction w/sm area of distal anterior romulo-infarct ischemia. EF 57%. Apical WMA, likely from prior infarct. Nondiagnostic EKG on pharm stress test.  
 Chronic kidney disease, stage III (moderate) ([address] 2/6/2012  Chronic renal disease, stage III ([address]  Congestive heart failure ([address] 2/6/2012  Congestive heart failure, unspecified 01/2012 Echocardiogram with severe global hypokinesis and EF 30% consistent with dilated cardiomyopathy.  DDD (degenerative disc disease), lumbar 9/10/2016  Diabetes mellitus ([address] 01/2015 Presented with blood sugar of 637 and HbA1c 13.4  Diabetes mellitus with kidney complication ([address] 8/1/5231  Dilated cardiomyopathy ([address] 2/6/2012  Hypertension  Hypothyroidism  Iron deficiency anemia 01/2015 Presented with Hb 5.1/ Hct 18.0. On Xarelto. No obvious source of bleeding. Did not follow-up for outpatient GI evaluation.  Low back pain 01/21/2015 Abdominal CT scan with thoracolumbar rotoscoliosis with severe multilevel degenerative disc disease and facet arthropathy; central canal stenosis at L4-L5.  Mental retardation 2/6/2012  Paroxysmal atrial fibrillation ([address] 01/2012 Presented with atrial fibrillation with RVR; converted spontaneously to sinus rhythm during hospitalization and subsequently started on amiodarone and Xarelto. Social History:  
 
Social History Socioeconomic History  Marital status: SINGLE Spouse name: Not on file  Number of children: Not on file  Years of education: Not on file  Highest education level: Not on file Tobacco Use  Smoking status: Never Smoker  Smokeless tobacco: Never Used Substance and Sexual Activity  Alcohol use: No  
 Drug use: No  
 Sexual activity: Never Birth control/protection: None Family History:  
History reviewed. No pertinent family history. Medications:  
No Known Allergies Current Facility-Administered Medications Medication Dose Route Frequency  apixaban (ELIQUIS) tablet 2.5 mg  2.5 mg Oral BID  atorvastatin (LIPITOR) tablet 40 mg  40 mg Oral QHS  carvediloL (COREG) tablet 6.25 mg  6.25 mg Oral BID  pantoprazole (PROTONIX) tablet 40 mg  40 mg Oral DAILY  senna-docusate (PERICOLACE) 8.6-50 mg per tablet 2 Tab  2 Tab Oral QHS  cefTRIAXone (ROCEPHIN) 1 g in sterile water (preservative free) 10 mL IV syringe  1 g IntraVENous Q24H  
 azithromycin (ZITHROMAX) 500 mg in  mL  500 mg IntraVENous Q24H  
 sodium chloride (NS) flush 5-40 mL  5-40 mL IntraVENous Q8H  
 sodium chloride (NS) flush 5-40 mL  5-40 mL IntraVENous PRN  
 acetaminophen (TYLENOL) tablet 650 mg  650 mg Oral Q6H PRN Or  
 acetaminophen (TYLENOL) suppository 650 mg  650 mg Rectal Q6H PRN  polyethylene glycol (MIRALAX) packet 17 g  17 g Oral DAILY PRN  promethazine (PHENERGAN) tablet 12.5 mg  12.5 mg Oral Q6H PRN Or  
 ondansetron (ZOFRAN) injection 4 mg  4 mg IntraVENous Q6H PRN  
 insulin lispro (HUMALOG) injection   SubCUTAneous AC&HS  
 glucose chewable tablet 16 g  4 Tab Oral PRN  
 glucagon (GLUCAGEN) injection 1 mg  1 mg IntraMUSCular PRN  
 dextrose (D50W) injection syrg 12.5-25 g  25-50 mL IntraVENous PRN  
 hydrALAZINE (APRESOLINE) 20 mg/mL injection 10 mg  10 mg IntraVENous Q6H PRN Current Outpatient Medications Medication Sig  
  thiamine HCL (B-1) 100 mg tablet Take 1 Tab by mouth daily.  senna-docusate (PERICOLACE) 8.6-50 mg per tablet Take 2 Tabs by mouth nightly.  ferrous sulfate 325 mg (65 mg iron) tablet Take 1 Tab by mouth daily (with breakfast).  atorvastatin (LIPITOR) 80 mg tablet Take 1 Tab by mouth nightly.  apixaban (ELIQUIS) 2.5 mg tablet Take 1 Tab by mouth two (2) times a day.  levothyroxine (SYNTHROID) 100 mcg tablet Take 1 Tab by mouth every morning.  glipiZIDE SR (GLUCOTROL XL) 2.5 mg CR tablet Take 1 Tab by mouth daily.  amiodarone (CORDARONE) 200 mg tablet TAKE 1 TABLET DAILY  COMPOUNDMAX BASE crea 240 g by Does Not Apply route four (4) times daily. Anti-Inflam Meloxicam 0.09% Topirmate 1% Methocarbamol 2%  Lidocaine 2% Prilocaine 2%  pantoprazole (PROTONIX) 40 mg tablet Take 1 tablet by mouth daily.  carvedilol (COREG) 6.25 mg tablet Take 1 tablet by mouth two (2) times a day.  triamcinolone acetonide (KENALOG) 0.1 % topical cream Apply  to affected area two (2) times a day. use thin layer  mupirocin (BACTROBAN) 2 % ointment Apply  to affected area daily.  Lancets Misc Use tid Physical Exam:  
 
Visit Vitals /90 Pulse (!) 104 Temp 98.3 °F (36.8 °C) Resp 20 SpO2 100% BP Readings from Last 3 Encounters:  
08/26/20 105/90  
07/20/20 112/56  
09/28/16 146/78 Pulse Readings from Last 3 Encounters:  
08/26/20 (!) 104  
07/20/20 94  
09/28/16 61 Wt Readings from Last 3 Encounters:  
07/20/20 59.3 kg (130 lb 11.7 oz) 09/28/16 56.2 kg (124 lb) 09/06/16 55.1 kg (121 lb 8 oz) General:  alert, cooperative, no distress, appears stated age Neck:  no JVD Lungs:  diminished breath sounds Heart:  irregularly irregular rhythm Abdomen:  abdomen is soft without significant tenderness, masses, organomegaly or guarding Extremities:  extremities normal, atraumatic, no cyanosis trace edema. Legs bandaged Skin: Warm and dry. no hyperpigmentation, vitiligo, or suspicious lesions Neuro: alert Psych: non focal 
 
 Data Review:  
 
Recent Labs  
  08/26/20 
0330 08/25/20 
1243 WBC 8.1 7.1 HGB 13.4 14.2 HCT 40.7 42.4  254 Recent Labs  
  08/26/20 
0330 08/25/20 
1243  137  
K 4.1 4.9  105 CO2 29 29 * 201* BUN 29* 28* CREA 1.33* 1.36* CA 8.2* 8.8 MG 1.7  --   
ALB 2.5*  --   
ALT 38  --   
INR 1.2 1.3* Results for orders placed or performed during the hospital encounter of 08/25/20 EKG, 12 LEAD, INITIAL Result Value Ref Range Ventricular Rate 103 BPM  
 Atrial Rate 104 BPM  
 QRS Duration 88 ms Q-T Interval 314 ms QTC Calculation (Bezet) 411 ms Calculated R Axis -49 degrees Calculated T Axis -129 degrees Diagnosis Atrial fibrillation with rapid ventricular response Left axis deviation Anteroseptal infarct (cited on or before 07-JUL-2020) Abnormal ECG When compared with ECG of 07-JUL-2020 23:40, Left anterior fascicular block is no longer present Minimal criteria for Inferior infarct are no longer present Serial changes of evolving Anterior infarct are present Confirmed by Bradley Ken ([id number]) on 8/25/2020 5:24:48 PM 
  
Results for orders placed or performed in visit on 01/27/15 AMB POC EKG ROUTINE W/ 12 LEADS, INTER & REP Narrative See note All Cardiac Markers in the last 24 hours:   
Lab Results Component Value Date/Time CPK 43 08/26/2020 03:30 AM  
 CPK 42 08/25/2020 11:58 PM  
 CKMB 2.9 08/26/2020 03:30 AM  
 CKMB 2.3 08/25/2020 11:58 PM  
 CKND1 6.7 (H) 08/26/2020 03:30 AM  
 CKND1 5.5 (H) 08/25/2020 11:58 PM  
 TROIQ 0.05 (H) 08/26/2020 03:30 AM  
 TROIQ 0.05 (H) 08/25/2020 11:58 PM  
 TROIQ 0.05 (H) 08/25/2020 12:43 PM  
 
 
Last Lipid:   
Lab Results Component Value Date/Time  Cholesterol, total 145 07/08/2020 04:20 AM  
 HDL Cholesterol 56 07/08/2020 04:20 AM  
 LDL, calculated 69 07/08/2020 04:20 AM  
 Triglyceride 100 07/08/2020 04:20 AM  
 CHOL/HDL Ratio 2.6 07/08/2020 04:20 AM  
 
 
Signed By: RONALDO Gardner August 26, 2020 Dee Cogan, MD

## 2020-08-26 NOTE — H&P
Hospitalist Admission History and Physical 
 
NAME:  Roslyn Rodriguez :   1955 MRN:   [id number] PCP:  Aliza Chua MD 
Date/Time:  2020 Subjective: CHIEF COMPLAINT: Left arm swelling HISTORY OF PRESENT ILLNESS:   This is a 50-year-old female with known past medical history of chronic kidney disease and congestive heart failure who was sent to the emergency department from the care facility for evaluation of left arm swelling and shortness of breath. Patient has intellectual disability and is a poor historian and does not provide much details. Patient denies any chest pain. In the ED patient was felt to have congestive heart failure exacerbation and was given 80 mg of Lasix. When I saw the patient she appeared comfortable and in no apparent distress. Unable to obtain much more information as patient is a very poor historian. I did try to call the patient's family but did not get a response. Past Medical History:  
Diagnosis Date  Cardiac echocardiogram 2015 EF 64%. No WMA. Mild LVH. Gr 2 DDfx. RVSP 30 mmHg. Mod-severe LAE. No significant valvular heart disease.  Cardiac nuclear imaging test, abnormal 2015 LV apical defect likely c/w prior infarction w/sm area of distal anterior romulo-infarct ischemia. EF 57%. Apical WMA, likely from prior infarct. Nondiagnostic EKG on pharm stress test.  
 Chronic kidney disease, stage III (moderate) ([address] 2012  Chronic renal disease, stage III ([address]  Congestive heart failure ([address] 2012  Congestive heart failure, unspecified 2012 Echocardiogram with severe global hypokinesis and EF 30% consistent with dilated cardiomyopathy.  DDD (degenerative disc disease), lumbar 9/10/2016  Diabetes mellitus ([address] 2015 Presented with blood sugar of 637 and HbA1c 13.4  Diabetes mellitus with kidney complication ([address] 1751  Dilated cardiomyopathy ([address] 2012  Hypertension  Hypothyroidism  Iron deficiency anemia 2015 Presented with Hb 5.1/ Hct 18.0. On Xarelto. No obvious source of bleeding. Did not follow-up for outpatient GI evaluation.  Low back pain 2015 Abdominal CT scan with thoracolumbar rotoscoliosis with severe multilevel degenerative disc disease and facet arthropathy; central canal stenosis at L4-L5.  Mental retardation 2012  Paroxysmal atrial fibrillation ([address] 2012 Presented with atrial fibrillation with RVR; converted spontaneously to sinus rhythm during hospitalization and subsequently started on amiodarone and Xarelto. History reviewed. No pertinent surgical history. Social History Tobacco Use  Smoking status: Never Smoker  Smokeless tobacco: Never Used Substance Use Topics  Alcohol use: No  
  
 
Family history patient does not provide details regarding her family's medical history No Known Allergies Prior to Admission Medications Prescriptions Last Dose Informant Patient Reported? Taking? COMPOUNDMAX BASE crea   No No  
Si g by Does Not Apply route four (4) times daily. Anti-Inflam Meloxicam 0.09% Topirmate 1% Methocarbamol 2%  Lidocaine 2% Prilocaine 2% Lancets Misc   No No  
Sig: Use tid  
amiodarone (CORDARONE) 200 mg tablet   No No  
Sig: TAKE 1 TABLET DAILY  
apixaban (ELIQUIS) 2.5 mg tablet   No No  
Sig: Take 1 Tab by mouth two (2) times a day. atorvastatin (LIPITOR) 80 mg tablet   No No  
Sig: Take 1 Tab by mouth nightly. carvedilol (COREG) 6.25 mg tablet   No No  
Sig: Take 1 tablet by mouth two (2) times a day. ferrous sulfate 325 mg (65 mg iron) tablet   No No  
Sig: Take 1 Tab by mouth daily (with breakfast). glipiZIDE SR (GLUCOTROL XL) 2.5 mg CR tablet   No No  
Sig: Take 1 Tab by mouth daily. levothyroxine (SYNTHROID) 100 mcg tablet   No No  
Sig: Take 1 Tab by mouth every morning.   
mupirocin (BACTROBAN) 2 % ointment   Yes No  
 Sig: Apply  to affected area daily. pantoprazole (PROTONIX) 40 mg tablet   No No  
Sig: Take 1 tablet by mouth daily. senna-docusate (PERICOLACE) 8.6-50 mg per tablet   No No  
Sig: Take 2 Tabs by mouth nightly. thiamine HCL (B-1) 100 mg tablet   No No  
Sig: Take 1 Tab by mouth daily. triamcinolone acetonide (KENALOG) 0.1 % topical cream   No No  
Sig: Apply  to affected area two (2) times a day. use thin layer Facility-Administered Medications: None REVIEW OF SYSTEMS:   
Review of Systems GENERAL: Patient is awake and follows simple commands and does not appear in distress at this time. Patient is a poor historian and does not provide a reliable review of systems Objective: VITALS:   
Visit Vitals BP (!) 125/97 Pulse 87 Temp 98.3 °F (36.8 °C) Resp 16 SpO2 99% Temp (24hrs), Av.4 °F (36.9 °C), Min:98.3 °F (36.8 °C), Max:98.4 °F (36.9 °C) PHYSICAL EXAM:  
General:    Sitting in bed in no acute distress. HEENT:  Pupils equal.  Sclera anicteric. Conjunctiva pink. Mucous membranes Moist, no ear or nasal discharge Neck:  Supple. Trachea midline. No accessory muscle use. No thyromegaly. has jugular venous distention, no carotid bruit CV:                  Irregular rate and rhythm. S1S2+ Lungs:   Decreased breath sound bilateral bases, coarse breath sounds Abdomen:   Soft, non-tender. Not distended. Bowel sounds normal.  
Extremities: No cyanosis. + edema. Pulses 1+ b/l, bilateral lower extremity small ulcerations are noted. Left upper extremity swelling is also noted Neurologic: Patient is awake. Follows simple commands, moves all 4 extremities Skin:                Warm and dry. No rashes. LAB DATA REVIEWED:   
No components found for: [place] Recent Labs  
  20 
1243   
K 4.9  CO2 29 BUN 28* CREA 1.36* * CA 8.8 WBC 7.1 HGB 14.2 HCT 42.4  CBC WITH AUTOMATED DIFF Collection Time: 08/25/20 12:43 PM  
Result Value Ref Range WBC 7.1 4.6 - 13.2 K/uL  
 RBC 4.25 4.20 - 5.30 M/uL  
 HGB 14.2 12.0 - 16.0 g/dL HCT 42.4 35.0 - 45.0 % MCV 99.8 (H) 74.0 - 97.0 FL  
 MCH 33.4 24.0 - 34.0 PG  
 MCHC 33.5 31.0 - 37.0 g/dL  
 RDW 15.6 (H) 11.6 - 14.5 % PLATELET 524 175 - 479 K/uL MPV 11.6 9.2 - 11.8 FL  
 NEUTROPHILS 77 (H) 40 - 73 % LYMPHOCYTES 14 (L) 21 - 52 % MONOCYTES 8 3 - 10 % EOSINOPHILS 1 0 - 5 % BASOPHILS 0 0 - 2 %  
 ABS. NEUTROPHILS 5.5 1.8 - 8.0 K/UL  
 ABS. LYMPHOCYTES 1.0 0.9 - 3.6 K/UL  
 ABS. MONOCYTES 0.6 0.05 - 1.2 K/UL  
 ABS. EOSINOPHILS 0.1 0.0 - 0.4 K/UL  
 ABS. BASOPHILS 0.0 0.0 - 0.1 K/UL  
 DF AUTOMATED    
 
 
CT Results  (Last 48 hours) 08/25/20 1605  CTA 1144 [facility] CONT Final result Impression:  IMPRESSION:  
Limited assessment for pulmonary embolus particularly in the left lower lobe  
area within this limitation, no definite pulmonary embolus to the proximal  
segmental pulmonary artery level. Moderate bilateral pleural effusions, right greater than left. Probable  
pulmonary edema. Superimposed pneumonia is not entirely excluded. Mild nonspecific mediastinal adenopathy. Diffuse anasarca. Narrative:  CTA CHEST PULMONARY EMBOLISM PROTOCOL INDICATION: Left arm DVT and shortness of breath. Evaluate for pulmonary  
embolus. TECHNIQUE: Thin collimation axial images obtained through the level of the  
pulmonary arteries with additional imaging through the chest following the  
uneventful administration of 72 cc [id number]-370 nonionic intravenous contrast.   
Images reconstructed into three dimensional coronal and sagittal projections for  
complete evaluation of the tortuous and overlapping pulmonary vascular  
structures and to reduce patient radiation dose.    
   
All CT scans at this facility are performed using dose optimization technique as  
appropriate to a performed exam, to include automated exposure control,  
adjustment of the mA and/or kV according to patient size (including appropriate  
matching first site-specific examinations), or use of iterative reconstruction  
technique. COMPARISON: None. FINDINGS:  
   
There is suboptimal contrast bolus timing which limits assessment for pulmonary  
embolus particularly in the left lower lobe. Within this limitation, there is no  
evidence of pulmonary embolus to the proximal segmental pulmonary artery level. Juliaette Kelp Thyroid: Unremarkable in its visualized aspects. Pericardium/ Heart: There is moderate cardiomegaly without evidence of  
pericardial effusion. Aorta/ Vessels: No aneurysm. Lymph Nodes: Mediastinal adenopathy is nonspecific. Lungs: There are moderate bilateral pleural effusions, right greater than left. There is septal thickening and groundglass in the lungs. There is no  
pneumothorax. There is bibasilar atelectasis. Upper Abdomen: Reflux of contrast into the hepatic veins. Bones/soft tissues: Diffuse anasarca. Degenerative changes of the spine Assessment/Plan:  
  
Acute on chronic systolic congestive heart failure exacerbation Possible pneumonia Suspected COVID-19 infection Dilated cardiomyopathy EF of 25% Chronic kidney disease stage III Atrial fibrillation on Eliquis Hypothyroidism Type 2 diabetes Hypertension History of CVA Intellectual disability as per the records Dysphagia 
___________________________________________________ PLAN:   
Patient will be admitted. Patient has received 80 mg IV Lasix in the ED. We will monitor for response and further Lasix dosing will be based on response and renal function. Cardiology has been consulted. We will continue preadmission Eliquis and carvedilol. Blood pressures will be monitored. Sugars will be monitored. Renal function will be monitored. Patient comes from a congregate facility and possibility of COVID-19 has to be considered. We will check for COVID-19. Droplet plus precautions. Will also add antibiotics for possible pneumonia. Follow cultures. Sugars will be monitored, sliding scale insulin Will request records from care facility. Speech therapy evaluation. Will check ultrasound of the left upper extremity because of the swelling. Rest depending on patient's further hospital course. I discussed the plan of care with the patient and she verbalized understanding and agreed. I also tried to call patient's Sister Peggy Saavedra at phone #[phone] and left a message. Palliative care consult. Prophylaxis:  []Lovenox  []Coumadin  []Hep SQ  []SCDs  []H2B/PPI , on Eliquis 
_________________________________________________ Care Plan discussed with: 
  [x]Patient   []Family    []ED Care Manager  [x]ED Doc   []Specialist : 
 
Total Time Coordinating Admission: 65 minutes []Total Critical Care Time:    
___________________________________________________ Admitting Physician: Moreno Hwang MD

## 2020-08-27 NOTE — PROGRESS NOTES
[facility] Hospitalist Group Progress Note Patient: Topher Galvan Age: 71 y.o. : 1955 MR#: [id number] SSN: [id number] Date/Time: 2020 Subjective:  
 
Patient is sitting in bed in no apparent distress, awake, follows commands Assessment/Plan:  
 
Acute on chronic systolic congestive heart failure exacerbation Likely pneumoniaPOA Await 19 test result is negative Dilated cardiomyopathy EF of 25% Chronic kidney disease stage III Paroxysmal atrial fibrillation- on Eliquis Hypothyroidism Type 2 diabetes Hypertension History of CVA Intellectual disability as per the records Dysphagia PLAN 
IV Lasix, cardiology is following Monitor renal function Will complete a course of antibiotics for pneumonia Await 19 test result is negative, clinically I have low suspicion. Discontinue droplet plus precautions On Eliquis Monitor sugars Diet as per speech PT OT Discussed with patient Palliative care is following Disposition- back to care facility soon Case discussed with:  [x]Patient  []Family  [x]Nursing  [x]Case Management DVT Prophylaxis:  []Lovenox  []Hep SQ  []SCDs  []Coumadin   []On Heparin gtt, on Eliquis Objective:  
VS:  
Visit Vitals /81 (BP 1 Location: Right arm, BP Patient Position: At rest) Pulse 86 Temp (!) 96.1 °F (35.6 °C) Comment: REported to [employer] Resp 15 SpO2 95% Tmax/24hrs: Temp (24hrs), Av.2 °F (36.2 °C), Min:96.1 °F (35.6 °C), Max:98.5 °F (36.9 °C) Input/Output:  
 
Intake/Output Summary (Last 24 hours) at 2020 1631 Last data filed at 2020 1341 Gross per 24 hour Intake 720 ml Output 1500 ml Net -780 ml General:  Awake, follows simple commands Cardiovascular:  S1S2+, RRR Pulmonary: Coarse breath sounds bilaterally GI:  Soft, BS+, NT, ND Extremities:  + edema Labs:   
Recent Results (from the past 24 hour(s)) GLUCOSE, POC  Collection Time: 20  4:43 PM  
 Result Value Ref Range Glucose (POC) 163 (H) 70 - 110 mg/dL METABOLIC PANEL, BASIC Collection Time: 08/27/20  5:43 AM  
Result Value Ref Range Sodium 141 136 - 145 mmol/L Potassium 4.2 3.5 - 5.5 mmol/L Chloride 105 100 - 111 mmol/L  
 CO2 33 (H) 21 - 32 mmol/L Anion gap 3 3.0 - 18 mmol/L Glucose 64 (L) 74 - 99 mg/dL BUN 27 (H) 7.0 - 18 MG/DL Creatinine 1.04 0.6 - 1.3 MG/DL  
 BUN/Creatinine ratio 26 (H) 12 - 20 GFR est AA >60 >60 ml/min/1.73m2 GFR est non-AA 53 (L) >60 ml/min/1.73m2 Calcium 8.8 8.5 - 10.1 MG/DL MAGNESIUM Collection Time: 08/27/20  5:43 AM  
Result Value Ref Range Magnesium 1.6 1.6 - 2.6 mg/dL GLUCOSE, POC Collection Time: 08/27/20  8:47 AM  
Result Value Ref Range Glucose (POC) 76 70 - 110 mg/dL Additional Data Reviewed:   
 
Signed By: Nneka Rsoe MD   
 August 27, 2020

## 2020-08-27 NOTE — PROGRESS NOTES
[facility] - Progress Note Admit Date: 8/25/2020 Assessment:  
 
Hospital Problems  Date Reviewed: 9/28/2016 Codes Class Noted POA Acute exacerbation of CHF (congestive heart failure) ([facility]) ICD-10-CM: I50.9 ICD-9-CM: 428.0  8/25/2020 Unknown Acute hypoxemic respiratory failure ([facility] ICD-10-CM: J96.01 
ICD-9-CM: 518.81  8/25/2020 Unknown CHF (congestive heart failure) ([facility]) ICD-10-CM: I50.9 ICD-9-CM: 428.0  8/25/2020 Unknown  
   
  
 
 
-Respiratory distress requiring oxygen. Presented from nursing home with SOB. Suspect more heart failure rather than pneumonia. 
-Acute on chronic HFrEF. 
-Possible pneumonia, on abx, covid testing pending. 
-Moderate bilateral pleural effusions by CTA. -Dilated CMY presumed due to hypertensive heart disease. Ejection fraction in the 25% range on echocardiogram 7/2020 with four-chamber dilatation conservatively managed in setting of comorbidities. (EF 30% 2012 improved to 64% 2015. Nuclear stress 2015 without significant ischemia). -Paroxysmal atrial fibrillation/flutter. which now appears persistent. Rates mildly elevated initially, now better. Recently resumed Coreg for rate control and started on Eliquis for anticoagulation. -H/o recent CVA. -H/o GIB while on Xarelto. -Hypertension, stable. -Diabetes mellitus, hgbA1c 7.8. 
-Chronic kidney disease, stable. -Hypoalbuminemia, continue nutritional support. 
-Hypothyroidism, on synthroid. -Mental retardation. Recently placed in SNF. 
  
Remotely followed by Dr. Salvador Britt 2015. Recently evaluated by [facility] during admission for CVA 7/2020. Plan:  
 
Appears to have diuresed well with improving renal function and stable hemodynamics with IV lasix, will continue for today. Hemodynamics overall stable, continued on coreg. No ace or arb historically given VALENTINA with hyperkalemia. Continued on eliquis. Continue pulmon treatment and support with abx and covid rule out. Recommend continued conservative cardiac management in setting of comorbidities. Subjective:  
 
Breathing stable on 3 liters. Objective:  
  
Patient Vitals for the past 8 hrs: 
 Pulse Resp BP SpO2  
08/27/20 0700 84 15  98 % 08/27/20 0600 84 17 (!) 138/97 98 % 08/27/20 0556 87 19  98 % 08/27/20 0500 76 13  99 % 08/27/20 0400 81 16  97 % 08/27/20 0230 68 11  99 % 08/27/20 0100 82 14  97 % No data found. Intake/Output Summary (Last 24 hours) at 8/27/2020 9574 Last data filed at 8/27/2020 0600 Gross per 24 hour Intake  Output 1500 ml Net -1500 ml Physical Exam: 
General:  alert, cooperative, no distress, appears stated age Neck:  no JVD Lungs:  diminished breath sounds Heart:  irregularly irregular rhythm Abdomen:  abdomen is soft Extremities:  extremities normal, atraumatic, no cyanosis trace edema Data Review:  
 
Labs: Results:  
   
Chemistry Recent Labs  
  08/27/20 
0543 08/26/20 
0330 08/25/20 
1243 GLU 64* 204* 201*  139 137  
K 4.2 4.1 4.9  105 105 CO2 33* 29 29 BUN 27* 29* 28* CREA 1.04 1.33* 1.36* CA 8.8 8.2* 8.8 MG 1.6 1.7  --   
AGAP 3 5 3 BUCR 26* 22* 21* AP  --  138*  --   
TP  --  5.7*  --   
ALB  --  2.5*  --   
GLOB  --  3.2  --   
AGRAT  --  0.8  --   
  
CBC w/Diff Recent Labs  
  08/26/20 
0330 08/25/20 
1243 WBC 8.1 7.1 RBC 4.08* 4.25  
HGB 13.4 14.2 HCT 40.7 42.4  254 GRANS 76* 77* LYMPH 14* 14* EOS 0 1 Cardiac Enzymes No results found for: CPK, CK, CKMMB, CKMB, RCK3, CKMBT, CKNDX, CKND1, PHILIP, TROPT, TROIQ, EM, TROPT, TNIPOC, BNP, BNPP Coagulation Recent Labs  
  08/26/20 
0330 08/25/20 
1243 PTP 15.4* 15.6* INR 1.2 1.3* APTT  --  32.7 Lipid Panel Lab Results Component Value Date/Time  Cholesterol, total 145 07/08/2020 04:20 AM  
 HDL Cholesterol 56 07/08/2020 04:20 AM  
 LDL, calculated 69 07/08/2020 04:20 AM  
 VLDL, calculated 20 07/08/2020 04:20 AM  
 Triglyceride 100 07/08/2020 04:20 AM  
 CHOL/HDL Ratio 2.6 07/08/2020 04:20 AM  
  
BNP No results found for: BNP, BNPP, XBNPT Liver Enzymes Recent Labs  
  08/26/20 
0330 TP 5.7* ALB 2.5* * Digoxin Thyroid Studies Lab Results Component Value Date/Time TSH 4.90 (H) 07/07/2020 11:50 PM  
    
 
Signed By: Shaunna Fothergill, PA August 27, 2020

## 2020-08-27 NOTE — PROGRESS NOTES
Problem: Self Care Deficits Care Plan (Adult) Goal: *Acute Goals and Plan of Care (Insert Text) Outcome: Resolved OCCUPATIONAL THERAPY EVALUATION/DISCHARGE Patient: Filipe Alvares (70 y.o. female) Date: 8/27/2020 Primary Diagnosis: Acute exacerbation of CHF (congestive heart failure) ([address] [I50.9] Acute hypoxemic respiratory failure ([address] [J96.01] CHF (congestive heart failure) ([address] [I50.9] Precautions:  Aspiration, Fall PLOF: Patient needed assist with self-care and functional transfers to wheelchair PTA. ASSESSMENT AND RECOMMENDATIONS: 
Upon entering the room, the patient was semi-reclined in bed trying to eat her lunch, alert, and agreeable to participate in OT evaluation. Patient stand by assist for self-feeding as she was observed spilling peas and min assist for donning gown. Patient baseline is assist for all care and tasks, chart confirms this. Patient at this time with no skilled OT needs. OT to d/c from caseload. Skilled occupational therapy is not indicated at this time. Discharge Recommendations: Return to LTC Further Equipment Recommendations for Discharge: hospital bed, mechanical lift, and wheelchair SUBJECTIVE:  
Patient stated I can feed myself OBJECTIVE DATA SUMMARY:  
 
Past Medical History:  
Diagnosis Date Cardiac echocardiogram 02/09/2015 EF 64%. No WMA. Mild LVH. Gr 2 DDfx. RVSP 30 mmHg. Mod-severe LAE. No significant valvular heart disease. Cardiac nuclear imaging test, abnormal 02/09/2015 LV apical defect likely c/w prior infarction w/sm area of distal anterior romulo-infarct ischemia. EF 57%. Apical WMA, likely from prior infarct. Nondiagnostic EKG on pharm stress test.  
 Chronic kidney disease, stage III (moderate) ([address] 2/6/2012 Chronic renal disease, stage III ([address] Congestive heart failure ([address] 2/6/2012 Congestive heart failure, unspecified 01/2012 Echocardiogram with severe global hypokinesis and EF 30% consistent with dilated cardiomyopathy. DDD (degenerative disc disease), lumbar 9/10/2016 Diabetes mellitus ([facility] 01/2015 Presented with blood sugar of 637 and HbA1c 13.4 Diabetes mellitus with kidney complication ([facility] 1/1/8528 Dilated cardiomyopathy ([facility] [address] 2/6/2012 Hypertension Hypothyroidism Iron deficiency anemia 01/2015 Presented with Hb 5.1/ Hct 18.0. On Xarelto. No obvious source of bleeding. Did not follow-up for outpatient GI evaluation. Low back pain 01/21/2015 Abdominal CT scan with thoracolumbar rotoscoliosis with severe multilevel degenerative disc disease and facet arthropathy; central canal stenosis at L4-L5. Mental retardation 2/6/2012 Paroxysmal atrial fibrillation ([facility] 01/2012 Presented with atrial fibrillation with RVR; converted spontaneously to sinus rhythm during hospitalization and subsequently started on amiodarone and Xarelto. History reviewed. No pertinent surgical history. Barriers to Learning/Limitations: yes;  cognitive Compensate with: visual, verbal, tactile, kinesthetic cues/model Home Situation:  
Home Situation Home Environment: Long term care Care Facility Name: ([address] [place] [address] ) [x]     Right hand dominant   []     Left hand dominant Cognitive/Behavioral Status: 
Neurologic State: Alert;Confused Orientation Level: Oriented to person;Oriented to place; Disoriented to time;Disoriented to situation Cognition: Follows commands;Decreased attention/concentration Safety/Judgement: Fall prevention Skin: Intact Edema: None noted Vision/Perceptual:   
Tracking: Able to track stimulus in all quadrants w/o difficulty Coordination: BUE Fine Motor Skills-Upper: Left Intact; Right Intact Gross Motor Skills-Upper: Left Intact; Right Intact Balance: 
Sitting: Impaired Sitting - Static: Fair (occasional) Strength: BUE Strength: Generally decreased, functional 
 
 Tone & Sensation: BUE Tone: Normal 
Sensation: Intact Range of Motion: BUE 
AROM: Within functional limits Functional Mobility and Transfers for ADLs: 
Bed Mobility: 
Supine to Sit: Minimum assistance(longsit) ADL Assessment: 
Feeding: Setup;Stand-by assistance Oral Facial Hygiene/Grooming: Setup;Stand-by assistance Bathing: Maximum assistance Upper Body Dressing: Minimum assistance Lower Body Dressing: Maximum assistance Toileting: Maximum assistance ADL Intervention: 
Feeding Feeding Assistance: Set-up; Stand-by assistance Container Management: Standing-by assistance Cutting Food: Stand-by assistance Utensil Management: Stand-by assistance Food to Mouth: Stand-by assistance Drink to Mouth: Stand-by assistance Cues: Verbal cues provided;Visual cues provided(as pt spilling peas onto gown) Upper Body Dressing Assistance Dressing Assistance: Minimum assistance Hospital Gown: Minimum  assistance Cognitive Retraining Safety/Judgement: Fall prevention Pain: 
Pain level pre-treatment: 0/10 Pain level post-treatment: 0/10 Pain Intervention(s): Medication (see MAR); Response to intervention: Nurse notified, See doc flow Activity Tolerance:  
Fair Please refer to the flowsheet for vital signs taken during this treatment. After treatment:  
[]  Patient left in no apparent distress sitting up in chair 
[x]  Patient left in no apparent distress in bed 
[x]  Call bell left within reach [x]  Nursing notified 
[]  Caregiver present 
[]  Bed alarm activated COMMUNICATION/EDUCATION:  
[x]      Role of Occupational Therapy in the acute care setting 
[x]      Home safety education was provided and the patient/caregiver indicated understanding. [x]      Patient/family have participated as able and agree with findings and recommendations. []      Patient is unable to participate in plan of care at this time.  
 
Thank you for this referral. 
Ryanne Davis OTR/L 
 Time Calculation: 11 mins Eval Complexity: History: MEDIUM Complexity : Expanded review of history including physical, cognitive and psychosocial  history ; Examination: MEDIUM Complexity : 3-5 performance deficits relating to physical, cognitive , or psychosocial skils that result in activity limitations and / or participation restrictions; Decision Making:MEDIUM Complexity : Patient may present with comorbidities that affect occupational performnce. Miniml to moderate modification of tasks or assistance (eg, physical or verbal ) with assesment(s) is necessary to enable patient to complete evaluation

## 2020-08-27 NOTE — PROGRESS NOTES
Physician Progress Note Almita Pham [facility] 
CSN #:                  [id number] :                       1955 ADMIT DATE:       2020 12:23 PM 
[address] [place] DATE: 
RESPONDING 
PROVIDER #:        Malathi Linton MD 
 
 
 
 
QUERY TEXT: 
 
Patient admitted with acute  systolic CHF, noted to have atrial fibrillation. If possible, please document in progress notes and discharge summary further specificity regarding the type of atrial fibrillation: The medical record reflects the following: 
 
Risk Factors: HTN;  cardiomyopathy; 
Clinical Indicators: per cardiology, Paroxysmal atrial fibrillation/flutter. which now appears persistent Treatment:   coreg for rate control 
eliquis   for anticoagulation Thank you,  Leonila Hannon  RN   CCDS  [phone] Kelly Collins Options provided: 
-- Paroxysmal Atrial Fibrillation 
-- Longstanding Persistent Atrial Fibrillation -- Permanent Atrial Fibrillation -- Persistent Atrial Fibrillation -- Chronic Atrial Fibrillation, unspecified 
-- Other - I will add my own diagnosis -- Disagree - Not applicable / Not valid -- Disagree - Clinically unable to determine / Unknown 
-- Refer to Clinical Documentation Reviewer PROVIDER RESPONSE TEXT: 
 
This patient has paroxysmal atrial fibrillation. Query created by: Edgard Flores on 2020 1:34 PM 
 
 
QUERY TEXT: 
 
Pt admitted with  acute on chronic systolic  CHF  with possible pneumonia. . Noted documentation of , Suspect more heart failure rather than pneumonia, on cardiology progress notes  today . If possible, please document in progress notes and discharge summary: The medical record reflects the following: 
 
Risk Factors:  NH resident ,  Vini Angeles  considered Clinical Indicators:  SOB  on admission;  coarse breath sounds 
cxr-  Cardiomegaly with interstitial airspace disease and more focal consolidation in the lung bases and right midlung. Findings could be interstitial edema 
although pneumonia is within the differential 
 
Treatment:  IV  ABX  (  rocephin, Zithromax ) continue Thank you,   Ana Bhatia  RN  CCDS  [phone] Options provided: -- pneumonia confirmed POA 
-- pneumonia confirmed not POA 
-- pneumonia ruled out 
-- Defer to cardiology  consultant documentation regarding pneumonia:I defer to cardiology  consultant regarding documentation of pneumonia. -- Other - I will add my own diagnosis -- Disagree - Not applicable / Not valid -- Disagree - Clinically unable to determine / Unknown 
-- Refer to Clinical Documentation Reviewer PROVIDER RESPONSE TEXT: 
 
The diagnosis of pneumonia was confirmed as POA.  
 
Query created by: Duy Donis on 8/27/2020 1:55 PM 
 
 
Electronically signed by:  Jaspreet Sanchez MD 8/27/2020 4:24 PM

## 2020-08-27 NOTE — ED NOTES
Pt resting and stable at this time. Pt was updated on plan of care but pt had trouble verbalizing her understanding. It was told during report that pt has some difficulty due to learning disability. Pt is awake, alert and oriented to her baseline. Pt denied pain but reported feeling chills. Pt afebrile. Blankets applied for comfort. Pt was provided meal tray and ate 100%.

## 2020-08-27 NOTE — ROUTINE PROCESS
Verbal shift change report given to David Burnham RN, RN (oncoming nurse) by Ana María Xiong RN (offgoing nurse). Report included the following information SBAR, Kardex, ED Summary, Procedure Summary, Intake/Output, MAR, Recent Results and Cardiac Rhythm Afib.

## 2020-08-27 NOTE — ED NOTES
Pt resting and stable. Pt was medicated per order. Pt encouraged to try and sleep. Lights turned out and warm blankets applied. Pt updated on plan of care.

## 2020-08-27 NOTE — PROGRESS NOTES
PHYSICAL THERAPY EVALUATION AND DISCHARGE Patient: Jose Luis Christian (26 y.o. female) Date: 8/27/2020 Primary Diagnosis: Acute exacerbation of CHF (congestive heart failure) ([facility] [I50.9] Acute hypoxemic respiratory failure ([facility] [J96.01] CHF (congestive heart failure) ([facility] [I50.9] Precautions:   Aspiration, Fall PLOF: Patient reports she requires assist with self care. She has WC and performs transfers with assistance. ASSESSMENT : 
Nurse cleared patient for PT evaluation. Patient received supine in bed agreeable to PT session. She is pleasantly confused, states year is \"2009\". Re oriented to place and time. Per chart review, patient with intellectual disability, requires increased time for processing and perseverates during conversation. She has decreased BLE strength left> right, with history of CVA. Patient requires minimal assistance to roll to the left and moderate assistance to roll to the right. Supine to long sit, pt pulling on B bed rails with minimal assistance. Held long sit for about 15 seconds with fair balance. Patient is likely at her baseline level of mobility. She requires assistance with self care and transfers to [facility], states \" they pick me up and put me in[ the WC]. Recommend patient return to LTC when medically cleared. Will discharge from PT caseload at this time. PLAN : 
Recommendations and Planned Interventions:  
No formal PT needs identified at this time. Discharge Recommendations: return to LTC Further Equipment Recommendations for Discharge: hospital bed, mechanical lift, and wheelchair SUBJECTIVE:  
Patient stated\" They pick me up and put me in it [facility] transfers]. \" OBJECTIVE DATA SUMMARY:  
 
Past Medical History:  
Diagnosis Date Cardiac echocardiogram 02/09/2015 EF 64%. No WMA. Mild LVH. Gr 2 DDfx. RVSP 30 mmHg. Mod-severe LAE. No significant valvular heart disease. Cardiac nuclear imaging test, abnormal 02/09/2015 LV apical defect likely c/w prior infarction w/sm area of distal anterior romulo-infarct ischemia. EF 57%. Apical WMA, likely from prior infarct. Nondiagnostic EKG on pharm stress test.  
 Chronic kidney disease, stage III (moderate) ([address] 2/6/2012 Chronic renal disease, stage III ([address] Congestive heart failure ([address] 2/6/2012 Congestive heart failure, unspecified 01/2012 Echocardiogram with severe global hypokinesis and EF 30% consistent with dilated cardiomyopathy. DDD (degenerative disc disease), lumbar 9/10/2016 Diabetes mellitus ([address] 01/2015 Presented with blood sugar of 637 and HbA1c 13.4 Diabetes mellitus with kidney complication ([address] 3/1/1701 Dilated cardiomyopathy ([address] 2/6/2012 Hypertension Hypothyroidism Iron deficiency anemia 01/2015 Presented with Hb 5.1/ Hct 18.0. On Xarelto. No obvious source of bleeding. Did not follow-up for outpatient GI evaluation. Low back pain 01/21/2015 Abdominal CT scan with thoracolumbar rotoscoliosis with severe multilevel degenerative disc disease and facet arthropathy; central canal stenosis at L4-L5. Mental retardation 2/6/2012 Paroxysmal atrial fibrillation ([address] 01/2012 Presented with atrial fibrillation with RVR; converted spontaneously to sinus rhythm during hospitalization and subsequently started on amiodarone and Xarelto. History reviewed. No pertinent surgical history. Barriers to Learning/Limitations: yes;  cognitive and altered mental status (i.e.Sedation, Confusion) Compensate with: Visual Cues, Verbal Cues, Tactile Cues, and Kinesthetic Cues Home Situation:  
  
Critical Behavior: 
Neurologic State: Alert;Confused Orientation Level: Oriented to person;Oriented to place Cognition: Follows commands Safety/Judgement: Fall prevention Strength:   
Strength: Generally decreased, functional 
  
 
Tone & Sensation:  
Tone: Normal 
Sensation: Intact Range Of Motion: AROM: Generally decreased, functional 
  
 
Functional Mobility: 
Bed Mobility: 
Rolling: Minimum assistance; Moderate assistance Supine to Sit: Minimum assistance(supine to long sit, B rails) Balance:  
Sitting: Impaired Sitting - Static: Fair (occasional) Pain: 
Pain level pre-treatment: 0/10 Pain level post-treatment: 0/10 Pain Intervention(s): Medication (see MAR); Rest, Ice, Repositioning Response to intervention: Nurse notified, See doc flow Activity Tolerance:  
Fair Please refer to the flowsheet for vital signs taken during this treatment. After treatment:  
[]         Patient left in no apparent distress sitting up in chair 
[x]         Patient left in no apparent distress in bed 
[x]         Call bell left within reach 
[]         Nursing notified 
[]         Caregiver present 
[]         Bed alarm activated 
[]         SCDs applied COMMUNICATION/EDUCATION:  
[x]         Role of Physical Therapy in the acute care setting. [x]         Fall prevention education was provided and the patient/caregiver indicated understanding. []         Patient/family have participated as able in goal setting and plan of care. []         Patient/family agree to work toward stated goals and plan of care. []         Patient understands intent and goals of therapy, but is neutral about his/her participation. []         Patient is unable to participate in goal setting/plan of care: ongoing with therapy staff. 
[]         Other: Thank you for this referral. 
Jed Mary, PT Time Calculation: 25 mins Eval Complexity: History: MEDIUM  Complexity : 1-2 comorbidities / personal factors will impact the outcome/ POC Exam:MEDIUM Complexity : 3 Standardized tests and measures addressing body structure, function, activity limitation and / or participation in recreation  Presentation: MEDIUM Complexity : Evolving with changing characteristics  Clinical Decision Making:Medium Complexity    Overall Complexity:MEDIUM

## 2020-08-27 NOTE — CONSULTS
Palliative Medicine Consult Patient Name: Benson Lees YOB: 1955 Date of Initial Consult: 8/27/2020 Reason for Consult: Goals of care discussions Requesting Provider: Douglas Bearden MD  
Primary Care Physician: Elijah Banda MD 
  
 SUMMARY:  
Benson Lees is a 71 y.o. with a past history of CKD stage 3, CHF, DMT2, HTN, hypothyroidism, and intellectual disability, who was admitted on 8/25/2020 from [address] [place] [address] with a diagnosis of acute on chronic congestive heart failure, possible pneumonia (COVID testing pending), and cardiomyopathy with EF 25%. Current medical issues leading to Palliative Medicine involvement include: support and care decisions. PALLIATIVE DIAGNOSES:  
1. Goals of care discussions 2. Acute on chronic congestive heart failure 3. Intellectual disability 4. Debility PLAN:  
1. Goals of care discussions: Palliative medicine team including ZACKARY Keane RN and I met with patient at patient's bedside. Patient is awake, alert, and oriented x 2. States the year is 2009. Patient has difficultly explaining current situation. Asked what time lunch and dinner was. Support offered to patient. No AMD on file. Called sister Judi Minor, no answer (left VM). Awaiting return phone call to provide update to discuss goals of care. At this time, patient is a full code with full interventions. 2. Acute on chronic congestive heart failure: Cardiology following. On IV lasix, with strict I's and O's. Oxygen for respiratory distress, improving. Bilateral pulmonary infiltrates- COVID testing pending. 3. Intellectual disability: Per chart review. Oriented x 2.  
4. Debility: W/c bound, with mechanical lift assistance. LTC resident at [facility]. Needs assist with all ADLs, accept can feed herself. 5. Initial consult note routed to primary continuity provider 6. Communicated plan of care with: Palliative IDT, patient, family (attempted). GOALS OF CARE / TREATMENT PREFERENCES:  
[====Goals of Care====] GOALS OF CARE: full code with full interventions Patient/Health Care Proxy Stated Goals: Prolong life TREATMENT PREFERENCES:  
Code Status: Full Code Advance Care Planning: No flowsheet data found. Medical Interventions: Full interventions The palliative care team has discussed with patient / health care proxy about goals of care / treatment preferences for patient. 
[====Goals of Care====] HISTORY:  
 
History obtained from: patient (limited), chart CHIEF COMPLAINT: feeling well HPI/SUBJECTIVE: The patient is:  
[x] Verbal and participatory [] Non-participatory due to:  
Limited, due to disorientation to current time and situation Clinical Pain Assessment (nonverbal scale for severity on nonverbal patients):  
Clinical Pain Assessment Severity: 0 FUNCTIONAL ASSESSMENT:  
 
Palliative Performance Scale (PPS): PPS: 40 PSYCHOSOCIAL/SPIRITUAL SCREENING:  
 
Advance Care Planning: No flowsheet data found. Any spiritual / Mandaen concerns: unable to assess 
[] Yes /  [] No 
 
Caregiver Burnout: 
[] Yes /  [] No /  [x] No Caregiver Present Anticipatory grief assessment:  Unable to assess 
[] Normal  / [] Maladaptive REVIEW OF SYSTEMS:  
 
Positive and pertinent negative findings in ROS are noted above in HPI. The following systems were [x] reviewed / [] unable to be reviewed as noted in HPI Other findings are noted below. Systems: constitutional, ears/nose/mouth/throat, respiratory, gastrointestinal, genitourinary, musculoskeletal, integumentary, neurologic, psychiatric, endocrine. Positive findings noted below. Modified ESAS Completed by: provider Pain: 0 Anxiety: 0 Nausea: 0 Dyspnea: 0 Constipation: No  
     
 
 
 PHYSICAL EXAM:  
 
From RN flowsheet: 
Wt Readings from Last 3 Encounters:  
07/20/20 59.3 kg (130 lb 11.7 oz) 09/28/16 56.2 kg (124 lb) 09/06/16 55.1 kg (121 lb 8 oz) Blood pressure 124/81, pulse 86, temperature 97.1 °F (36.2 °C), resp. rate 15, SpO2 94 %. Pain Scale 1: Numeric (0 - 10) Pain Intensity 1: 0 Constitutional: Awake, alert, NAD 
ENMT: no nasal discharge, moist mucous membranes Cardiovascular: distal pulses intact Respiratory: breathing not labored, symmetric Musculoskeletal: no deformity, no tenderness to palpation Skin: warm, dry Neurologic: following commands, moving all extremities, oriented x 2 HISTORY:  
 
Active Problems: 
  Acute exacerbation of CHF (congestive heart failure) ([facility] (8/25/2020) Acute hypoxemic respiratory failure ([facility] (8/25/2020) CHF (congestive heart failure) ([facility] (8/25/2020) Past Medical History:  
Diagnosis Date  Cardiac echocardiogram 02/09/2015 EF 64%. No WMA. Mild LVH. Gr 2 DDfx. RVSP 30 mmHg. Mod-severe LAE. No significant valvular heart disease.  Cardiac nuclear imaging test, abnormal 02/09/2015 LV apical defect likely c/w prior infarction w/sm area of distal anterior romulo-infarct ischemia. EF 57%. Apical WMA, likely from prior infarct. Nondiagnostic EKG on pharm stress test.  
 Chronic kidney disease, stage III (moderate) ([address] 2/6/2012  Chronic renal disease, stage III ([address]  Congestive heart failure ([address] 2/6/2012  Congestive heart failure, unspecified 01/2012 Echocardiogram with severe global hypokinesis and EF 30% consistent with dilated cardiomyopathy.  DDD (degenerative disc disease), lumbar 9/10/2016  Diabetes mellitus ([address] 01/2015 Presented with blood sugar of 637 and HbA1c 13.4  Diabetes mellitus with kidney complication ([address] 2/1/5292  Dilated cardiomyopathy ([address] 2/6/2012  Hypertension  Hypothyroidism  Iron deficiency anemia 01/2015 Presented with Hb 5.1/ Hct 18.0. On Xarelto.  No obvious source of bleeding. Did not follow-up for outpatient GI evaluation.  Low back pain 01/21/2015 Abdominal CT scan with thoracolumbar rotoscoliosis with severe multilevel degenerative disc disease and facet arthropathy; central canal stenosis at L4-L5.  Mental retardation 2/6/2012  Paroxysmal atrial fibrillation ([address] 01/2012 Presented with atrial fibrillation with RVR; converted spontaneously to sinus rhythm during hospitalization and subsequently started on amiodarone and Xarelto. History reviewed. No pertinent surgical history. History reviewed. No pertinent family history. History reviewed, no pertinent family history. Social History Tobacco Use  Smoking status: Never Smoker  Smokeless tobacco: Never Used Substance Use Topics  Alcohol use: No  
 
No Known Allergies Current Facility-Administered Medications Medication Dose Route Frequency  apixaban (ELIQUIS) tablet 5 mg  5 mg Oral BID  furosemide (LASIX) injection 40 mg  40 mg IntraVENous DAILY  atorvastatin (LIPITOR) tablet 40 mg  40 mg Oral QHS  carvediloL (COREG) tablet 6.25 mg  6.25 mg Oral BID  pantoprazole (PROTONIX) tablet 40 mg  40 mg Oral DAILY  senna-docusate (PERICOLACE) 8.6-50 mg per tablet 2 Tab  2 Tab Oral QHS  cefTRIAXone (ROCEPHIN) 1 g in sterile water (preservative free) 10 mL IV syringe  1 g IntraVENous Q24H  
 azithromycin (ZITHROMAX) 500 mg in  mL  500 mg IntraVENous Q24H  
 sodium chloride (NS) flush 5-40 mL  5-40 mL IntraVENous Q8H  
 sodium chloride (NS) flush 5-40 mL  5-40 mL IntraVENous PRN  
 acetaminophen (TYLENOL) tablet 650 mg  650 mg Oral Q6H PRN Or  
 acetaminophen (TYLENOL) suppository 650 mg  650 mg Rectal Q6H PRN  polyethylene glycol (MIRALAX) packet 17 g  17 g Oral DAILY PRN  promethazine (PHENERGAN) tablet 12.5 mg  12.5 mg Oral Q6H PRN  Or  
 ondansetron (ZOFRAN) injection 4 mg  4 mg IntraVENous Q6H PRN  
  insulin lispro (HUMALOG) injection   SubCUTAneous AC&HS  
 glucose chewable tablet 16 g  4 Tab Oral PRN  
 glucagon (GLUCAGEN) injection 1 mg  1 mg IntraMUSCular PRN  
 dextrose (D50W) injection syrg 12.5-25 g  25-50 mL IntraVENous PRN  
 hydrALAZINE (APRESOLINE) 20 mg/mL injection 10 mg  10 mg IntraVENous Q6H PRN  
 
 
 
 LAB AND IMAGING FINDINGS:  
 
Lab Results Component Value Date/Time WBC 8.1 08/26/2020 03:30 AM  
 HGB 13.4 08/26/2020 03:30 AM  
 PLATELET 922 83/60/4771 03:30 AM  
 
Lab Results Component Value Date/Time Sodium 141 08/27/2020 05:43 AM  
 Potassium 4.2 08/27/2020 05:43 AM  
 Chloride 105 08/27/2020 05:43 AM  
 CO2 33 (H) 08/27/2020 05:43 AM  
 BUN 27 (H) 08/27/2020 05:43 AM  
 Creatinine 1.04 08/27/2020 05:43 AM  
 Calcium 8.8 08/27/2020 05:43 AM  
 Magnesium 1.6 08/27/2020 05:43 AM  
 Phosphorus 2.3 (L) 07/12/2020 08:29 AM  
  
Lab Results Component Value Date/Time Alk. phosphatase 138 (H) 08/26/2020 03:30 AM  
 Protein, total 5.7 (L) 08/26/2020 03:30 AM  
 Albumin 2.5 (L) 08/26/2020 03:30 AM  
 Globulin 3.2 08/26/2020 03:30 AM  
 
Lab Results Component Value Date/Time INR 1.2 08/26/2020 03:30 AM  
 Prothrombin time 15.4 (H) 08/26/2020 03:30 AM  
 aPTT 32.7 08/25/2020 12:43 PM  
  
Lab Results Component Value Date/Time Iron 28 (L) 07/08/2020 04:34 PM  
 TIBC 223 (L) 07/08/2020 04:34 PM  
 Iron % saturation 13 (L) 07/08/2020 04:34 PM  
  
No results found for: PH, PCO2, PO2 No components found for: [place] Lab Results Component Value Date/Time CK 43 08/26/2020 03:30 AM  
 CK - MB 2.9 08/26/2020 03:30 AM  
  
 
 
   
 
Total time: 30 minutes Counseling / coordination time, spent as noted above: 20 minutes 
> 50% counseling / coordination?: yes, patient, family, RN Prolonged service was provided for  []30 min   []75 min in face to face time in the presence of the patient, spent as noted above. Time Start:  
Time End: Note: this can only be billed with 36215 (initial) or 15407 (follow up). If multiple start / stop times, list each separately.

## 2020-08-28 NOTE — PROGRESS NOTES
Contacted by nursing staff that patient was brought back after [facility]. Spoke with Herman Foster, head of admissions whom relayed a miscommunication on the facilities end, and there is no room for patient tonight. Patient is back in room 546. They can accept patient in morning. CM Manager was informed. Attending was informed. Sister Hayden Walters was informed. Nursing was informed. KANDI Lua Case Management [phone]

## 2020-08-28 NOTE — PROGRESS NOTES
Problem: Dysphagia (Adult) Goal: *Acute Goals and Plan of Care (Insert Text) Description: Patient will: 1. Tolerate PO trials with 0 s/s overt distress in 4/5 trials - met 2. Utilize compensatory swallow strategies/maneuvers (decrease bite/sip, size/rate, alt. liq/sol) with min cues in 4/5 trials - met 3. Perform oral-motor/laryngeal exercises to increase oropharyngeal swallow function with min cues - n/a 
4. Complete an objective swallow study (i.e., MBSS) to assess swallow integrity, r/o aspiration, and determine of safest LRD, min A as indicated/ordered by MD - n/a Recommend:  
Soft solid diet, thin liquids Meds as tolerated Feeding assistance as needed Aspiration precautions HOB >45 degrees during all intake and for at least 30 min after po Small bites/sips, slow rate of intake, alternating bites/sips Oral care three times daily Outcome: Resolved/Met SPEECH LANGUAGE PATHOLOGY DYSPHAGIA TREATMENT & DISCHARGE Patient: Adrián Pace (9 y.o. female) Date: 8/28/2020 Diagnosis: Acute exacerbation of CHF (congestive heart failure) ([address] [I50.9] Acute hypoxemic respiratory failure ([address] [J96.01] CHF (congestive heart failure) ([address] [I50.9] <principal problem not specified> Precautions:  Aspiration, Fall PLOF: As per H&P  
 
ASSESSMENT: 
Pt was seen at bedside for follow up dysphagia management. Per nursing: pt tolerated reg solids with thin liquids with no coughing/choking this AM. Nursing also reported no difficulty with chewing or pocketing with PO. Pt tolerated reg solids and thin liquids this PM with no overt s/sx of aspiration and positive oral clearance. Laryngeal elevation appeared functional/timely to palpation. Pt with slightly increased mastication of solids secondary to limited natural dentition. Recommend diet upgrade to soft solid with thin liquids, aspiration precautions, oral care TID, and meds as tolerated.  Pt able to self feed this PM; however, may require assistance with tray set up during meals. No further skilled SLP services are indicated at this time as pt appears to be tolerating LRD without difficulty. Please re-consult if needed. Progression toward goals: 
[x]         Goals met/approximated 
[]         Not making progress/Not appropriate - will d/c POC PLAN: 
Recommendations and Planned Interventions: 
Maximum therapeutic gains met; safest, least restrictive diet achieved. D/C ST intervention at this time. Discharge Recommendations:  Isaiah Yuan and To Be Determined SUBJECTIVE:  
Patient stated Danny Barrera they going to be bringing my food soon? . OBJECTIVE:  
Cognitive and Communication Status: 
Neurologic State: Alert, Confused Orientation Level: Oriented to person, Oriented to place, Disoriented to time, Disoriented to situation Cognition: Follows commands Perception: Appears intact Perseveration: No perseveration noted Safety/Judgement: Fall prevention Dysphagia Treatment: 
Oral Assessment: 
Oral Assessment Labial: No impairment Dentition: Natural, Limited Oral Hygiene: adequate Lingual: No impairment Velum: No impairment Mandible: No impairment P.O. Trials: 
 Patient Position: 64 at [facility] Vocal quality prior to P.O.: No impairment Consistency Presented: Thin liquid, Solid How Presented: Self-fed/presented, Cup/sip, Straw Bolus Acceptance: No impairment Bolus Formation/Control: Impaired Type of Impairment: Mastication Propulsion: No impairment Oral Residue: None Initiation of Swallow: No impairment Laryngeal Elevation: Functional 
 Aspiration Signs/Symptoms: None Pharyngeal Phase Characteristics: No impairment, issues, or problems Effective Modifications: Small sips and bites, Alternate liquids/solids Cues for Modifications: Moderate Oral Phase Severity: Mild Pharyngeal Phase Severity : No impairment PAIN: 
Start of Tx: 0 End of Tx: 0 After treatment: []              Patient left in no apparent distress sitting up in chair 
[x]              Patient left in no apparent distress in bed 
[x]              Call bell left within reach [x]              Nursing notified 
[]              Caregiver present 
[]              Bed alarm activated COMMUNICATION/EDUCATION:  
[x] Aspiration precautions; swallow safety; compensatory techniques [x]        Patient/family able to participate in training and education  
[]  Patient unable to participate in education; education ongoing with staff 
[] Patient understands goals/intent of therapy; neutral about participation Thank you for this referral. 
 
Ivania Tavarez M.S. CCC-SLP/L Speech-Language Pathologist

## 2020-08-28 NOTE — WOUND CARE
Physical Exam 
Musculoskeletal:  
     Arms: 
 
     Legs: 
 
 
Room 546: wound assess Pt on lift team schedule. Heel prevention boots placed bilaterally. Discussed with primary nurse Kennedi Arnold. Will turn over care to nursing staff at this time. RD consulted for food preferences & evaluation. Purewick & booster incontinence pad placed for urine incontinence.   
Cedric MORRISON, RN, [facility], [address]

## 2020-08-28 NOTE — PROGRESS NOTES
Chart reviewed. 8/26 COVID -,  LTC resident at [facility] plan to return when they are able to take admissions. CM matched them in [place] and inquired about COVID testing requirements. Alex Jimenez RN BSN Care Manager [phone]

## 2020-08-28 NOTE — PROGRESS NOTES
1940 
Patient returned to room#546 via EMS. Spoke with [facility] case management due to [facility] does not have a bed for patient. Discharge cancelled by nursing admin. 1955 Samanta lara spoke with [facility] who verbalized they were not made aware of patient return by their facility representative. Jorge Luis Nieves she will notify patient family of situation. Re Austin RN/charge nurse notified of situation.

## 2020-08-28 NOTE — DIABETES MGMT
Glycemic Control Plan of Care Recommend starting lantus 5 units daily. , 208, 296, 228 Currently receiving corrective lispro, advanced to very insulin resistant, 4 times daily. PTA  DM medication - glipizide 2.5 mg daily A1C is 7.8 % for average blood glucose of 177 mg/dl Lab Results Component Value Date/Time Hemoglobin A1c 7.8 (H) 08/26/2020 03:30 AM  
 
 
Nathan Montero MPH RN CDE 
[phone]

## 2020-08-28 NOTE — DISCHARGE SUMMARY
[address], [address] DISCHARGE SUMMARY Name: Demian Resendiz MRN: [id number] Age / Sex: 71 y.o. / female CSN: [id number] YOB: 1955 Length of Stay: 3 days Admit Date: 8/25/2020 Discharge Date: PRIMARY CARE PHYSICIAN: Familia Baez MD 
 
 
DISCHARGE DIAGNOSES: 
 
Acute on chronic systolic congestive heart failure exacerbation Likely pneumoniaPOA Await 19 test result is negative Dilated cardiomyopathy EF of 25% Chronic kidney disease stage III Paroxysmal atrial fibrillation- on Eliquis Hypothyroidism Type 2 diabetes Hypertension History of CVA Intellectual disability as per the records Dysphagia CONSULTS CALLED: Cardiology PROCEDURES DONE: None COURSE IN THE HOSPITAL: This is a 69-year-old female with known past medical history of dilated cardiomyopathy and chronic kidney disease stage III and paroxysmal atrial fibrillation who was brought to the ED from the care facility with complaints of worsening shortness of breath. Patient was admitted. It was felt that patient had acute on chronic systolic congestive heart failure exacerbation. Patient was started on IV Lasix. I's and O's were monitored. Electrolytes and renal function were monitored. There was also concern that patient had pneumonia and patient was started on a course of antibiotics. COVID-19 testing was done and it is negative. Today patient's renal function has worsened so cardiology has discontinued the IV Lasix. I discussed with cardiology and we will start the patient on Lasix 20 mg p.o. daily starting from Sunday. At this time I recommend that patient have some repeat labs done on August 31, 2020 and results to the supervising physician at the facility immediately. Patient was also noted to have some high sugars and patient has been on a sliding scale insulin here.   I have added a small dose of Lantus at bedtime. Patient's sugars will also need to be monitored closely while at the facility and further titration of the insulin regimen may be needed. I will defer it to the patient's physician at the facility. Patient is still on oxygen 2 L via nasal cannula here and I recommend that this be slowly weaned down at the facility to maintain oxygen saturations greater than 92%. I have discussed with the  and she has advised me that patient is able to transition back to the care facility today. I have also called and discussed with patient's Sister Christian over the phone and she has agreed to the discharge plans. MEDICATIONS ON DISCHARGE: 
 
Current Discharge Medication List  
  
START taking these medications Details  
acetaminophen (TYLENOL) 325 mg tablet Take 2 Tabs by mouth every six (6) hours as needed for Pain or Fever. Qty: 30 Tab, Refills: 0  
  
polyethylene glycol (MIRALAX) 17 gram packet Take 1 Packet by mouth daily as needed for Constipation. Qty: 15 Packet, Refills: 0  
  
insulin lispro (HUMALOG) 100 unit/mL injection Check FSBS AC*HS For sugars between 160 and 200- Give 2 units SQ, For sugars between 201 and 250, Give 3 units SQ, For sugars Between 251 and 300, give 5 units SQ, For Sugars between 301 and 350, give 7 units SQ For sugars between 351 and 400, give 8 units SQ and contact PCP Qty: 1 Vial, Refills: 0  
  
azithromycin (Zithromax) 500 mg tab Take 1 Tab by mouth daily for 2 days. Qty: 2 Tab, Refills: 0 Associated Diagnoses: Community acquired pneumonia, unspecified laterality  
  
cefpodoxime (VANTIN) 100 mg tablet Take 1 Tab by mouth two (2) times a day for 4 days. Qty: 8 Tab, Refills: 0  
  
insulin glargine (Lantus U-100 Insulin) 100 unit/mL injection 4 Units by SubCUTAneous route nightly. Hold if fingerstick blood sugar is less than 140 Qty: 1 Vial, Refills: 0  
  
furosemide (Lasix) 20 mg tablet Take 1 Tab by mouth daily. Start from Sunday, August 30, 2020 Qty: 30 Tab, Refills: 0 CONTINUE these medications which have CHANGED Details  
apixaban (ELIQUIS) 5 mg tablet Take 1 Tab by mouth two (2) times a day. Qty: 60 Tab, Refills: 0 CONTINUE these medications which have NOT CHANGED Details  
senna-docusate (PERICOLACE) 8.6-50 mg per tablet Take 2 Tabs by mouth nightly. Qty: 30 Tab, Refills: 0  
  
ferrous sulfate 325 mg (65 mg iron) tablet Take 1 Tab by mouth daily (with breakfast). Qty: 30 Tab, Refills: 0  
  
atorvastatin (LIPITOR) 80 mg tablet Take 1 Tab by mouth nightly. Qty: 30 Tab, Refills: 0  
  
levothyroxine (SYNTHROID) 100 mcg tablet Take 1 Tab by mouth every morning. Qty: 30 Tab, Refills: 0  
  
pantoprazole (PROTONIX) 40 mg tablet Take 1 tablet by mouth daily. Qty: 30 tablet, Refills: 1  
  
carvedilol (COREG) 6.25 mg tablet Take 1 tablet by mouth two (2) times a day. Qty: 60 tablet, Refills: 1 STOP taking these medications  
  
 thiamine HCL (B-1) 100 mg tablet Comments:  
Reason for Stopping:   
   
 glipiZIDE SR (GLUCOTROL XL) 2.5 mg CR tablet Comments:  
Reason for Stopping:   
   
 amiodarone (CORDARONE) 200 mg tablet Comments:  
Reason for Stopping: COMPOUNDMAX BASE crea Comments:  
Reason for Stopping:   
   
 triamcinolone acetonide (KENALOG) 0.1 % topical cream Comments:  
Reason for Stopping:   
   
 mupirocin (BACTROBAN) 2 % ointment Comments:  
Reason for Stopping:   
   
 Lancets Misc Comments:  
Reason for Stopping:   
   
  
 
 
 
DISCHARGE VITAL SIGNS: 
Visit Vitals /84 (BP 1 Location: Right arm, BP Patient Position: At rest) Pulse 87 Temp 97.9 °F (36.6 °C) Resp 18 Ht 5' 3\" (1.6 m) Wt 61.7 kg (136 lb 1.6 oz) SpO2 96% BMI 24.11 kg/m² DISCHARGE PHYSICAL EXAMINATION: 
General:  Awake, follows simple commands, hard of hearing Cardiovascular:  S1S2+, RRR Pulmonary: Coarse breath sounds bilaterally GI:  Soft, BS+, NT, ND Extremities: + edema CONDITION ON DISCHARGE: Stable. DISPOSITION: Care facility FOLLOW-UP RECOMMENDATIONS:  
Follow-up Information Follow up With Specialties Details Why Contact Info Sterling Moseley MD Internal Medicine   [address] 
[phone] OTHER INSTRUCTIONS: 
Diet- Cardiac, soft solids Activity - as tolerated FALL PRECAUTIONS 
ASPIRATION PRECAUTIONS DECUBITUS PRECAUTIONS Head of bed greater than 30 degrees at all times Incentive Spirometry Q30 minutes when awake PT, OT Evaluate and Treat Speech therapy evaluate and treat Check CBC, CMP, Mg on August 31, 2020 and results to supervising physician at the facility immediately. Notify supervising physician at the facility immediately if patient has no bowel movement for 2 consecutive days. F/u with PCP in 1 week F/u with cardiologist in 2 weeks TIME SPENT ON DISCHARGE ACTIVITIES: More than 35 minutes. Dragon medical dictation software was used for portions of this report. Unintended errors may occur. Signed:  Tremayne Brasher MD 
 
  8/28/2020

## 2020-08-28 NOTE — PROGRESS NOTES
Transition of Care Plan to LTC 
 
LTC Transition: 
Patient has been accepted to [facility] health and rehab and meets criteria for admission. Patient will  be transported by [facility] stretcher transport and expected to leave at 530-6pm. [facility] transport [phone] Confirmation # [id number] Communication to Patient/Family: Met with patient and called Salome Siddiqi (identified care giver) and they are agreeable to the transition plan. Communication to SNF/Rehab: 
Bedside RN, Paige Peacock, has been notified of the transition plan to the facility and to call report (phone number [phone]). Discharge information has been updated on the AVS. And communicated to facility via [facility], or CC link. SNF/Rehab Transition: PCP/Specialist: [facility] Please include all hard scripts for controlled substances, med rec and dc summary, and AVS in packet. Reviewed and confirmed with facility representative, Indira Richardson  at [facility] they can manage the patient care needs for the following:  
 
Berta Armendariz with (X) only those applicable: 
 
Medication: 
[x]  Medications will be available at the facility []  IV Antibiotics []  Controlled Substance - hard copy to be sent with patient  
[]  Weekly Labs Documents: 
[] Hard RX  Number sent 
[] MAR 
[] Kardex [x] AVS 
[] Wound care note [x]Transfer Summary/Discharge Summary Equipment: 
[]  CPAP/BiPAP []  Wound Vacuum 
[]  Marlow or Urinary Device 
[]  PICC/Central Line 
[]  Nebulizer 
[]  Ventilator Treatment: 
[]Isolation (for MRSA, VRE, etc.) []Surgical Drain Management []Tracheostomy Care 
[]Dressing Changes []Dialysis with transportation and chair time  Center Mode of tranpsort []PEG Care 
[x]Oxygen []Daily Weights for Heart Failure Dietary: 
[]Any diet limitations []Tube Feedings []Total Parenteral Management (TPN) Eligible for Medicaid Long Term Services and Supports Yes: 
 [] Eligible for medical assistance or will become eligible within 180 days and LTSS completed. [] Provider/Patient and/or support system has requested screening. [x] LTSS copy provided to patient or responsible party, on file at [address] [] LTSS unavailable at discharge will send once processed to SNF provider. 
[] LTSS  unavailable at discharged mailed to patient 
[] LTSS performed by outside agency  on  with tracking number No:  
[] Private pay and is not financially eligible for Medicaid within the next 180 days. [] Reside out-of-state. [] A residents of a state owned/operated facility that is licensed 
by [address] [employer] or [facility] 
[] Enrollment in [facility] hospice services 
[] [address] [place] [address] [facility] 
[] Patient /Family declines to have screening completed or provide financial information for screening Financial Resources: 
Medicaid   
[] Initiated and application pending  
[x] Full coverage Advanced Care Plan: 
[]Surrogate Decision Maker of Care 
[]POA [x]Communicated Code Status/ sent FULL code Other Belinda Cornell RN BSN Care Manager [phone]

## 2020-08-28 NOTE — PROGRESS NOTES
conducted an initial consultation and Spiritual Assessment for oSn Thayer, who is a 71 y.o.,female. Patient's Primary Language is: [place]. According to the patient's EMR Congregation Affiliation is: [facility].  
 
The reason the Patient came to the hospital is:  
Patient Active Problem List  
 Diagnosis Date Noted  Acute exacerbation of CHF (congestive heart failure) ([address] 08/25/2020  Acute hypoxemic respiratory failure ([address] 08/25/2020  CHF (congestive heart failure) ([address] 08/25/2020  Paroxysmal atrial fibrillation with RVR ([address] 07/08/2020  CVA (cerebral vascular accident) ([address] 07/08/2020  VALENTINA (acute kidney injury) ([facility] 07/08/2020  Fall 07/08/2020  Conjunctivitis of both eyes 07/08/2020  Hypothyroid 07/08/2020  Cellulitis 07/08/2020  Labia enlarged 07/08/2020  Elevated troponin 07/08/2020  DDD (degenerative disc disease), lumbar 09/10/2016  Advance directive declined by patient 11/03/2015  Iron deficiency anemia, severe requiring transfusions 1/2015 01/21/2015  Hypothyroidism 11/19/2013  Hypertension 02/28/2012  Diabetes mellitus with kidney complication ([address] 90/58/4183  Paroxysmal atrial fibrillation ([address] 02/06/2012  Congestive heart failure ([address] 02/06/2012  Mental retardation 02/06/2012  Chronic kidney disease, stage III (moderate) ([address] 02/06/2012  Dilated cardiomyopathy ([facility] 02/06/2012  Scoliosis 02/06/2012 The  provided the following Interventions: 
Initiated a relationship of care and support. Provided chaplaincy education. Provided information about Spiritual Care Services. Offered prayer and assurance of continued prayers on patient's behalf. Chart reviewed. The following outcomes where achieved: 
Patient processed feeling about current hospitalization. Patient expressed gratitude for 's visit.  
 
Assessment: 
Patient does not have any Confucianist/cultural needs that will affect patient's preferences in health care. There are no spiritual or Congregation issues which require intervention at this time. Plan: 
Chaplains will continue to follow and will provide pastoral care on an as needed/requested basis.  recommends bedside caregivers page  on duty if patient shows signs of acute spiritual or emotional distress. [address] Spiritual Care  
[phone]

## 2020-08-28 NOTE — ROUTINE PROCESS
Bedside and Verbal shift change report given to Stacy Reynolds RN (oncoming nurse) by Andrea Walton RN 
 (offgoing nurse). Report included the following information SBAR and Kardex.

## 2020-08-28 NOTE — PROGRESS NOTES
[facility]  -  Progress Note Patient: Delphine Neil MRN: [id number]  SSN: [id number] YOB: 1955  Age: 71 y.o. Sex: female Admit Date: 8/25/2020 Assessment:  
 
-Respiratory distress requiring oxygen. Presented from nursing home with SOB.  Suspect more heart failure rather than pneumonia. 
-Acute on chronic HFrEF. 
-Possible pneumonia, on abx, covid testing pending. 
-Moderate bilateral pleural effusions by CTA. -Dilated CMY presumed due to hypertensive heart disease. Ejection fraction in the 25% range on echocardiogram 7/2020 with four-chamber dilatation conservatively managed in setting of comorbidities. (EF 30% 2012 improved to 64% 2015. Nuclear stress 2015 without significant ischemia). -Paroxysmal atrial fibrillation/flutter.  which now appears persistent. Rates mildly elevated initially, now better. Recently resumed Coreg for rate control and started on Eliquis for anticoagulation. -H/o recent CVA. -H/o GIB while on Xarelto. -Hypertension, stable. -Diabetes mellitus, hgbA1c 7.8. 
-Chronic kidney disease, stable. -Hypoalbuminemia, continue nutritional support. 
-Hypothyroidism, on synthroid. -Mental retardation. Recently placed in SNF. 
  
Remotely followed by Dr. Muriel Emery 2015. Recently evaluated by [facility] during admission for CVA 7/2020. Plan:  
 
-Will stop IV Lasix, Cr up to 1.44 today from 1.04 yesterday. 
-Continued on Coreg.  
-Will hold off on ACEi/ARB for now given increase in Cr today but would plan to add if renal function stabilizes. Subjective: No new complaints. Objective:  
  
Patient Vitals for the past 8 hrs: 
 Temp Pulse Resp BP SpO2  
08/28/20 0734 97.7 °F (36.5 °C) 88 18 124/84 96 % 08/28/20 0400 97 °F (36.1 °C) 93 18 129/85 98 % Patient Vitals for the past 96 hrs: 
 Weight 08/28/20 0400 136 lb 1.6 oz (61.7 kg) Intake/Output Summary (Last 24 hours) at 8/28/2020 0900 Last data filed at 8/28/2020 0602 Gross per 24 hour Intake 720 ml Output 1300 ml Net -580 ml Physical Exam: 
General:  alert, cooperative, no distress, appears stated age Neck:  supple Lungs:  clear to auscultation bilaterally Heart:  Irregularly irregular rhythm Abdomen:  abdomen is soft without significant tenderness, masses, organomegaly or guarding Extremities:  Atraumatic, trace edema Data Review:  
 
Labs: Results:  
   
Chemistry Recent Labs  
  08/28/20 
0328 08/27/20 
0543 08/26/20 
0330 * 64* 204*  141 139  
K 4.0 4.2 4.1  105 105 CO2 31 33* 29 BUN 31* 27* 29* CREA 1.44* 1.04 1.33* CA 8.7 8.8 8.2* MG  --  1.6 1.7 AGAP 5 3 5 BUCR 22* 26* 22* AP  --   --  138* TP  --   --  5.7* ALB  --   --  2.5*  
GLOB  --   --  3.2 AGRAT  --   --  0.8 CBC w/Diff Recent Labs  
  08/26/20 
0330 08/25/20 
1243 WBC 8.1 7.1 RBC 4.08* 4.25  
HGB 13.4 14.2 HCT 40.7 42.4  254 GRANS 76* 77* LYMPH 14* 14* EOS 0 1 Coagulation Recent Labs  
  08/26/20 
0330 08/25/20 
1243 PTP 15.4* 15.6* INR 1.2 1.3* APTT  --  32.7 Lipid Panel Lab Results Component Value Date/Time Cholesterol, total 145 07/08/2020 04:20 AM  
 HDL Cholesterol 56 07/08/2020 04:20 AM  
 LDL, calculated 69 07/08/2020 04:20 AM  
 VLDL, calculated 20 07/08/2020 04:20 AM  
 Triglyceride 100 07/08/2020 04:20 AM  
 CHOL/HDL Ratio 2.6 07/08/2020 04:20 AM  
  
Liver Enzymes Recent Labs  
  08/26/20 
0330 TP 5.7* ALB 2.5* * Thyroid Studies Lab Results Component Value Date/Time  TSH 4.90 (H) 07/07/2020 11:50 PM

## 2020-08-28 NOTE — PROGRESS NOTES
0759 
Bedside and Verbal shift change report received from [facility] (offgoing nurse). Report included the following information SBAR, Kardex, MAR and Recent Results. 307 [facility] Wound care nurse in room. All wds/drsg addressed. 37[facility] Attempted to call report to [address] [place] [address] at [phone] without success(number busy) [facility] Attempted #2 to call report to [address] [place] [address] at [phone] without success(number busy) [address] Attempted #3 to call report to [address] [place] [address] at [phone] without success(number busy) [address] Telemetry discontinued for discharge. 3534 [facility] Attempted #4 to call report to [address] [place] [address] at [phone] without success(number busy) [address] Discharged via stretcher with )[email or web address] [email or web address] accompanied by EMS.

## 2020-08-29 NOTE — PROGRESS NOTES
Roxanne Leblanc at [address] [facility] is checking with building/admin to ensure patient can return, awaiting call back.

## 2020-08-29 NOTE — PROGRESS NOTES
0720 Received report on pt.from off going RN. Resting quietly in bed on rounds. Denies c/o pain or SOB at this time. Oriented to person and place. Reoriented to date and time. Follows commands. Gets agitated easily at times. No acute distress noted. Call bell at side. Bed alarm on. Will cont to monitor for any changes in status. 0830 sitting up in bed feeding self. No swallowing difficulty noted. 1000 cleaned of inct urine. Call bell at side. Bed alarms on. 
 
1200 sitting up in bed eating. 1545 report called to Sandy Huitron at [address]. 1745  pt ate dinner and then transported per [facility] transport to [facility]. No distress noted at time of transport.

## 2020-08-29 NOTE — DISCHARGE SUMMARY
Addendum to discharge summary (original discharge summary was done on August 28, 2020) Patient has remained stable overnight. I have discussed with the  and patient will be transitioned to care facility today. Visit Vitals /87 (BP 1 Location: Right arm, BP Patient Position: At rest) Pulse 88 Temp 96.9 °F (36.1 °C) Resp 20 Ht 5' 3\" (1.6 m) Wt 64.2 kg (141 lb 9.6 oz) SpO2 96% BMI 25.08 kg/m² In addition to previous discharge summary instructions- 
 
Please also check TSH and free T4 at the facility in 1 weekresults to supervising physician at the facility immediately.  
 
Sheri Curry MD

## 2020-08-29 NOTE — ROUTINE PROCESS
Bedside and Verbal shift change report given to Ruthie Quan RN (oncoming nurse) by Roxana Badillo RN 
 (offgoing nurse). Report included the following information SBAR.

## 2020-08-29 NOTE — PROGRESS NOTES
Transition of Care Plan to SNF/Rehab 
 
SNF/Rehab Transition: 
Patient has been accepted to [facility] and rehab and meets criteria for admission. Patient will  be transported by [facility] trans [phone] () and expected to leave at 2pm. 
 
Communication to Patient/Family: Met with patient and sister, Shannan Madrigal and they are agreeable to the transition plan. Communication to SNF/Rehab: 
Bedside RN, ike, has been notified of the transition plan to the facility and to call report (phone number [phone]). Discharge information has been updated on the AVS. And communicated to facility via [facility], or CC link. SNF/Rehab Transition: PCP/Specialist: Jarrod Hoyos Please include all hard scripts for controlled substances, med rec and dc summary, and AVS in packet. Reviewed and confirmed with facility representative, Tamara Jose  at [phone] and rehab  they can manage the patient care needs for the following:  
 
Carol Goodman with (X) only those applicable: 
 
Medication: 
[x]  Medications will be available at the facility []  IV Antibiotics []  Controlled Substance - hard copy to be sent with patient  
[]  Weekly Labs Documents: 
[] Hard RX  Number sent  
[] MAR 
[] Kardex [] AVS 
[] Wound care note [x]Transfer Summary/Discharge Summary Equipment: 
[]  CPAP/BiPAP []  Wound Vacuum 
[]  Marlow or Urinary Device 
[]  PICC/Central Line 
[]  Nebulizer 
[]  Ventilator Treatment: 
[]Isolation (for MRSA, VRE, etc.) []Surgical Drain Management []Tracheostomy Care 
[]Dressing Changes []Dialysis with transportation and chair time  Center Mode of tranpsort []PEG Care 
[x]Oxygen []Daily Weights for Heart Failure Dietary: 
[]Any diet limitations []Tube Feedings []Total Parenteral Management (TPN) Eligible for Medicaid Long Term Services and Supports Yes: 
[] Eligible for medical assistance or will become eligible within 180 days and LTSS completed. [] Provider/Patient and/or support system has requested screening. [x] LTSS copy provided to patient or responsible party, . 
[] LTSS unavailable at discharge will send once processed to SNF provider. 
[] LTSS  unavailable at discharged mailed to patient 
[] LTSS performed by outside agency  on  with tracking number No:  
[] Private pay and is not financially eligible for Medicaid within the next 180 days. [] Reside out-of-state. [] A residents of a state owned/operated facility that is licensed 
by [address] [facility] or [facility] 
[] Enrollment in [facility] hospice services 
[] [address] [place] [address] [address] 
[] Patient /Family declines to have screening completed or provide financial information for screening Financial Resources: 
Medicaid   
[] Initiated and application pending  
[x] Full coverage Advanced Care Plan: 
[]Surrogate Decision Maker of Care 
[][facility]  [facility] [x]Communicated Code Status/ sent  FULL Other KANDI Vergara Case Management [phone]

## 2020-09-04 NOTE — PROGRESS NOTES
Yolanda Veliz presents today for a post-hospital follow-up. She was hospitalized from 8/25/20 through 8/28/20. She was brought from her living facility to the hospital due to complaints of shortness of breath which was felt to be due to acute on chronic CHF and likely pneumonia. She was started on IV lasix and was also given antibiotics. Her renal function worsened and the IV lasix was discontinued and she was transitioned to oral lasix at 20mg daily. Her last echo was done during her other hospitalization in July 2020. It showed an EF of 20-25%, severe global systolic function, abnormal LV wall motion, mild pulmonary hypertension with PASP of 45 mmHg, severely dilated left atrium. She is a 69-year-old female with past medical history of dilated cardiomyopathy, chronic kidney disease stage III, paroxysmal atrial fibrillation, intellectual disability, diabetes, hypertension, hypothyroidism, and history of TIAs and recent CVA in early July 2020. Ora Milligan She was last seen by Dr. Yuli Reno in Jan. 2015. She was also hospitalized in early July 2020 for a CVA. She was living alone at the time and was found on the floor by her family. At that time, she was off of her medications including amiodarone, coreg, was not anticoagulated due to remote history of GI bleed. She has history of significant GI bleed while on Xarelto in the past.  She was discharged to [facility]. She was accompanied to the office today by a staff member from [facility]. Ms. Guadalupe Herndon is alert and answers some questions appropriately. When asked about symptoms, she was somewhat vague. Denies chest pain, tightness, heaviness, and palpitations. Admits to some shortness of breath at rest, dyspnea on exertion, denies orthopnea and PND. Denies abdominal bloating. Denies lightheadedness, dizziness, and syncope.    She is unaware that she has lower extremity edema and denies claudication. Denies nausea, vomiting, diarrhea, melena, hematochezia. Denies hematuria, urgency, frequency. Denies fever, chills. She is wheelchair bound and cannot stand up to weigh. She tells me that she does not walk around the facility. PMH: 
Past Medical History:  
Diagnosis Date  Cardiac echocardiogram 02/09/2015 EF 64%. No WMA. Mild LVH. Gr 2 DDfx. RVSP 30 mmHg. Mod-severe LAE. No significant valvular heart disease.  Cardiac nuclear imaging test, abnormal 02/09/2015 LV apical defect likely c/w prior infarction w/sm area of distal anterior romulo-infarct ischemia. EF 57%. Apical WMA, likely from prior infarct. Nondiagnostic EKG on pharm stress test.  
 Chronic kidney disease, stage III (moderate) ([address] 2/6/2012  Chronic renal disease, stage III ([address]  Congestive heart failure ([address] 2/6/2012  Congestive heart failure, unspecified 01/2012 Echocardiogram with severe global hypokinesis and EF 30% consistent with dilated cardiomyopathy.  DDD (degenerative disc disease), lumbar 9/10/2016  Diabetes mellitus ([address] 01/2015 Presented with blood sugar of 637 and HbA1c 13.4  Diabetes mellitus with kidney complication ([address] 3/9/1392  Dilated cardiomyopathy ([address] 2/6/2012  Hypertension  Hypothyroidism  Iron deficiency anemia 01/2015 Presented with Hb 5.1/ Hct 18.0. On Xarelto. No obvious source of bleeding. Did not follow-up for outpatient GI evaluation.  Low back pain 01/21/2015 Abdominal CT scan with thoracolumbar rotoscoliosis with severe multilevel degenerative disc disease and facet arthropathy; central canal stenosis at L4-L5.  Mental retardation 2/6/2012  Paroxysmal atrial fibrillation ([address] 01/2012 Presented with atrial fibrillation with RVR; converted spontaneously to sinus rhythm during hospitalization and subsequently started on amiodarone and Xarelto. PSH: 
No past surgical history on file.  
 
MEDS: 
 Current Outpatient Medications Medication Sig  
 apixaban (ELIQUIS) 5 mg tablet Take 1 Tab by mouth two (2) times a day.  acetaminophen (TYLENOL) 325 mg tablet Take 2 Tabs by mouth every six (6) hours as needed for Pain or Fever.  polyethylene glycol (MIRALAX) 17 gram packet Take 1 Packet by mouth daily as needed for Constipation.  insulin lispro (HUMALOG) 100 unit/mL injection Check FSBS AC*HS For sugars between 160 and 200- Give 2 units SQ, For sugars between 201 and 250, Give 3 units SQ, For sugars Between 251 and 300, give 5 units SQ, For Sugars between 301 and 350, give 7 units SQ For sugars between 351 and 400, give 8 units SQ and contact PCP  insulin glargine (Lantus U-100 Insulin) 100 unit/mL injection 4 Units by SubCUTAneous route nightly. Hold if fingerstick blood sugar is less than 140  furosemide (Lasix) 20 mg tablet Take 1 Tab by mouth daily. Start from Sunday, August 30, 2020  senna-docusate (PERICOLACE) 8.6-50 mg per tablet Take 2 Tabs by mouth nightly.  atorvastatin (LIPITOR) 80 mg tablet Take 1 Tab by mouth nightly.  levothyroxine (SYNTHROID) 100 mcg tablet Take 1 Tab by mouth every morning.  pantoprazole (PROTONIX) 40 mg tablet Take 1 tablet by mouth daily.  carvedilol (COREG) 6.25 mg tablet Take 1 tablet by mouth two (2) times a day.  ferrous sulfate 325 mg (65 mg iron) tablet Take 1 Tab by mouth daily (with breakfast). No current facility-administered medications for this visit. Allergies and Sensitivities: 
No Known Allergies Family History: No family history on file. Social History: She  reports that she has never smoked. She has never used smokeless tobacco.  She  reports no history of alcohol use. Review of Systems: As above, otherwise 10 point review negative. Physical: 
Visit Vitals /85 Pulse 92 SpO2 92% Exam: 
Neck:  Supple, no JVD, no carotid bruits CV:  Normal S1 and  S2, no murmurs, rubs, or gallops noted. Irregularly, irregular rhythm Lungs:  Clear to ausculation throughout, no wheezes or rales Abd:  Soft, non-tender, non-distended with good bowel sounds. No hepatosplenomegaly Extremities:  1+/2+ softly pitting lower extremity edema Data: 
EKG:  Read by Dequan Luke MD. Maria Eugenia Washburn fibrillation.  Prior anterior infarct.  Non-specific ST abnormality LABS: 
Lab Results Component Value Date/Time Sodium 142 08/31/2020 01:45 PM  
 Potassium 4.6 08/31/2020 01:45 PM  
 Chloride 103 08/31/2020 01:45 PM  
 CO2 28 08/31/2020 01:45 PM  
 Glucose 196 (H) 08/31/2020 01:45 PM  
 BUN 29 (H) 08/31/2020 01:45 PM  
 Creatinine 1.31 (H) 08/31/2020 01:45 PM  
 
Lab Results Component Value Date/Time Cholesterol, total 145 07/08/2020 04:20 AM  
 HDL Cholesterol 56 07/08/2020 04:20 AM  
 LDL, calculated 69 07/08/2020 04:20 AM  
 Triglyceride 100 07/08/2020 04:20 AM  
 CHOL/HDL Ratio 2.6 07/08/2020 04:20 AM  
 
Lab Results Component Value Date/Time ALT (SGPT) 42 08/31/2020 01:45 PM  
 
 
 
Impression/Plan: 1. Chronic systolic heart failure with some volume overload noted today 2. Dilated cardiomyopathy, EF 25% 3. Chronic kidney disease, stage 3 
4. Paroxysmal atrial fibrillation, rate controlled and anticoagulated with Eliquis 5. Diabetes mellitus, recommend Hgb A1c less than 7% from cardiac standpoint 6. Essential hypertension, blood pressure stable 7. History of CVA Ms. Alina Hartley was seen today for a post-hospital follow-up. She was accompanied to the office by a staff member from [facility]. She is wheelchair bound and is unable to stand to weigh. Staff member states that she is up in the wheelchair several hours a day with legs in dependent position and not elevated. She has 1+ to 2+ softly pitting lower extremity edema bilaterally.   She is not in any respiratory distress however, her oxygen saturation is 92% on room air. Breath sounds are essentially clear. She uses oxygen at the facility when she is in bed. Will have her take Lasix 40mg daily for 3 days only and then back to 20mg daily. She was given a lab slip for a BMP and NT pro-BNP to be done in one week. Her EKG shows atrial fibrillation, rate is controlled and she is anticoagulated with Eliquis. No other medication changes were made. Staff member was informed that Ms. Fatmata Gandara should not be allowed to drink excessive amounts of fluids. Low sodium diet difficult to maintain in the facility. She will follow-up with Dr. Jane Piña as scheduled and as needed. She has been lost to follow-up in our office since 2015. Alexandra Moreau MSN, FNP-BC Please note:  Portions of this chart were created with Dragon medical speech to text program.  Unrecognized errors may be present.

## 2020-09-17 NOTE — PATIENT INSTRUCTIONS
Lasix 40mg daily for 3 days only and then back to 20mg once a day BMP and NT pro-BNP in one week Follow-up with Dr. Michele Bear as scheduled and as needed

## 2020-09-22 NOTE — PROGRESS NOTES
Attempt to contact patient via telephone on 9/22/20 for follow up. Unable to reach. Left message on voicemail with office contact information. No Patient medical information left on message.

## 2020-10-07 NOTE — DISCHARGE INSTRUCTIONS
Patient Education        Fluid Restriction: Care Instructions  Your Care Instructions     A buildup of fluid in the body can cause low sodium levels in the blood. It may also cause symptoms such as swelling and pain. Your doctor may suggest that you limit liquids, including foods that contain a lot of liquid. Limiting liquids is called fluid restriction. Keeping track of the amount of fluids you take in may help you feel better. Your doctor will tell you how much fluid you can have in a day. Follow-up care is a key part of your treatment and safety. Be sure to make and go to all appointments, and call your doctor if you are having problems. It's also a good idea to know your test results and keep a list of the medicines you take. How can you care for yourself at home? · Find a way of tracking the fluids you take in that works for you. Here are two methods you can try:  ? Write down how much you drink throughout the day. ? Keep a container filled with the amount of liquid allowed for the day. As you drink liquids during the day, such as a 6-ounce cup of coffee, pour that same amount out of the container. When the container is empty, you've had your liquid for the day. · Count any foods that will melt (such as ice cream, gelatin, or flavored ice treats) or liquid foods (such as soup) as part of your fluids for the day. Also count the liquid in canned fruits and vegetables as part of your daily intake, or drain them well before serving. · Space your liquids throughout the day. Then you won't be tempted to drink more than the amount your doctor recommends. · To relieve thirst without taking in extra water, try chewing gum, sucking on hard candy (sugarless if you have diabetes), or rinsing your mouth with water and spitting it out. Where can you learn more? Go to http://www.gray.com/  Enter Z528 in the search box to learn more about \"Fluid Restriction: Care Instructions. \"  Current as of: December 16, 2019               Content Version: 12.6  © 0900-0691 [employer]. Care instructions adapted under license by [employer] (which disclaims liability or warranty for this information). If you have questions about a medical condition or this instruction, always ask your healthcare professional. [address] any warranty or liability for your use of this information. Patient Education        Heart Failure: Care Instructions  Your Care Instructions     Heart failure occurs when your heart does not pump as much blood as the body needs. Failure does not mean that the heart has stopped pumping but rather that it is not pumping as well as it should. Over time, this causes fluid buildup in your lungs and other parts of your body. Fluid buildup can cause shortness of breath, fatigue, swollen ankles, and other problems. By taking medicines regularly, reducing sodium (salt) in your diet, checking your weight every day, and making lifestyle changes, you can feel better and live longer. Follow-up care is a key part of your treatment and safety. Be sure to make and go to all appointments, and call your doctor if you are having problems. It's also a good idea to know your test results and keep a list of the medicines you take. How can you care for yourself at home? Medicines    · Be safe with medicines. Take your medicines exactly as prescribed. Call your doctor if you think you are having a problem with your medicine.     · Do not take any vitamins, over-the-counter medicine, or herbal products without talking to your doctor first. Luis Ramirez not take ibuprofen (Advil or Motrin) and naproxen (Aleve) without talking to your doctor first. They could make your heart failure worse.     · You may take some of the following medicine. ?  Angiotensin-converting enzyme inhibitors (ACEIs) or angiotensin II receptor blockers (ARBs) reduce the heart's workload, lower blood pressure, and reduce swelling. Taking an ACEI or ARB may lower your chance of needing to be hospitalized. ? Beta-blockers can slow heart rate, decrease blood pressure, and improve your condition. Taking a beta-blocker may lower your chance of needing to be hospitalized. ? Diuretics, also called water pills, reduce swelling. You will get more details on the specific medicines your doctor prescribes. Diet    · Your doctor may suggest that you limit sodium. Your doctor can tell you how much sodium is right for you. An example is less than 3,000 mg a day. This includes all the salt you eat in cooking or in packaged foods. People get most of their sodium from processed foods. Fast food and restaurant meals also tend to be very high in sodium.     · Ask your doctor how much liquid you can drink each day. You may have to limit liquids. Weight    · Weigh yourself without clothing at the same time each day. Record your weight. Call your doctor if you have a sudden weight gain, such as more than 2 to 3 pounds in a day or 5 pounds in a week. (Your doctor may suggest a different range of weight gain.) A sudden weight gain may mean that your heart failure is getting worse. Activity level    · Start light exercise (if your doctor says it is okay). Even if you can only do a small amount, exercise will help you get stronger, have more energy, and manage your weight and your stress. Walking is an easy way to get exercise. Start out by walking a little more than you did before. Bit by bit, increase the amount you walk.     · When you exercise, watch for signs that your heart is working too hard. You are pushing yourself too hard if you cannot talk while you are exercising. If you become short of breath or dizzy or have chest pain, stop, sit down, and rest.     · If you feel \"wiped out\" the day after you exercise, walk slower or for a shorter distance until you can work up to a better pace.     · Get enough rest at night. Sleeping with 1 or 2 pillows under your upper body and head may help you breathe easier. Lifestyle changes    · Do not smoke. Smoking can make a heart condition worse. If you need help quitting, talk to your doctor about stop-smoking programs and medicines. These can increase your chances of quitting for good. Quitting smoking may be the most important step you can take to protect your heart.     · Limit alcohol to 2 drinks a day for men and 1 drink a day for women. Too much alcohol can cause health problems.     · Avoid getting sick from colds and the flu. Get a pneumococcal vaccine shot. If you have had one before, ask your doctor whether you need another dose. Get a flu shot each year. If you must be around people with colds or the flu, wash your hands often. When should you call for help? Call 911 if you have symptoms of sudden heart failure such as:    · You have severe trouble breathing.     · You cough up pink, foamy mucus.     · You have a new irregular or rapid heartbeat. Call your doctor now or seek immediate medical care if:    · You have new or increased shortness of breath.     · You are dizzy or lightheaded, or you feel like you may faint.     · You have sudden weight gain, such as more than 2 to 3 pounds in a day or 5 pounds in a week. (Your doctor may suggest a different range of weight gain.)     · You have increased swelling in your legs, ankles, or feet.     · You are suddenly so tired or weak that you cannot do your usual activities. Watch closely for changes in your health, and be sure to contact your doctor if you develop new symptoms. Where can you learn more? Go to http://www.gray.com/  Enter S874 in the search box to learn more about \"Heart Failure: Care Instructions. \"  Current as of: December 16, 2019               Content Version: 12.6  © 2378-8923 [employer].    Care instructions adapted under license by [employer] (which disclaims liability or warranty for this information). If you have questions about a medical condition or this instruction, always ask your healthcare professional. [address] any warranty or liability for your use of this information.

## 2020-10-07 NOTE — PROGRESS NOTES
Attempt to contact patient via telephone on 10/7/20 for follow up. Unable to reach. Left message on voicemail with office contact information. No Patient medical information left on message. Spoke with Mr. Kwabena Lozano of [address] [place] [address] and rehab on phone and states that Pt. Is for LTC at [address] [place] [address] and rehab. No transition of care outreach indicated due to patient discharge to a [address] [place] [address]. This episode is closed. Post Hospitalization Encounter Resolved.

## 2020-10-07 NOTE — ED PROVIDER NOTES
Sravan Briseno is a 71 y.o. female with past medical history of hypertension, dilated cardiomyopathy with an EF of 25%, paroxysmal A. fib fib on Eliquis, CKD, diabetes, hypothyroidism, and intellectual disability/MR coming from [facility] rehab for abnormal labs. Patient was sent with paperwork that show an elevated BNP and CO2 level. These were on regular screening/maintenance labs. Nursing staff reports the patient is otherwise been at her baseline. Patient is on 4 L of O2 at all times due to her cardiomyopathy. She denies any shortness of breath. She denies any chest pain. She denies any worsening leg swelling. Patient is not sure why she is here and history is limited due to her intellectual disability. Past Medical History:  
Diagnosis Date  Cardiac echocardiogram 02/09/2015 EF 64%. No WMA. Mild LVH. Gr 2 DDfx. RVSP 30 mmHg. Mod-severe LAE. No significant valvular heart disease.  Cardiac nuclear imaging test, abnormal 02/09/2015 LV apical defect likely c/w prior infarction w/sm area of distal anterior romulo-infarct ischemia. EF 57%. Apical WMA, likely from prior infarct. Nondiagnostic EKG on pharm stress test.  
 Chronic kidney disease, stage III (moderate) ([address] 2/6/2012  Chronic renal disease, stage III ([address]  Congestive heart failure ([address] 2/6/2012  Congestive heart failure, unspecified 01/2012 Echocardiogram with severe global hypokinesis and EF 30% consistent with dilated cardiomyopathy.  DDD (degenerative disc disease), lumbar 9/10/2016  Diabetes mellitus ([address] 01/2015 Presented with blood sugar of 637 and HbA1c 13.4  Diabetes mellitus with kidney complication ([address] 4/2/3880  Dilated cardiomyopathy ([address] 2/6/2012  Hypertension  Hypothyroidism  Iron deficiency anemia 01/2015 Presented with Hb 5.1/ Hct 18.0. On Xarelto. No obvious source of bleeding. Did not follow-up for outpatient GI evaluation.  Low back pain 01/21/2015 Abdominal CT scan with thoracolumbar rotoscoliosis with severe multilevel degenerative disc disease and facet arthropathy; central canal stenosis at L4-L5.  Mental retardation 2/6/2012  Paroxysmal atrial fibrillation ([facility] 01/2012 Presented with atrial fibrillation with RVR; converted spontaneously to sinus rhythm during hospitalization and subsequently started on amiodarone and Xarelto. No past surgical history on file. No family history on file. Social History Socioeconomic History  Marital status: SINGLE Spouse name: Not on file  Number of children: Not on file  Years of education: Not on file  Highest education level: Not on file Occupational History  Not on file Social Needs  Financial resource strain: Not on file  Food insecurity Worry: Not on file Inability: Not on file  Transportation needs Medical: Not on file Non-medical: Not on file Tobacco Use  Smoking status: Never Smoker  Smokeless tobacco: Never Used Substance and Sexual Activity  Alcohol use: No  
 Drug use: No  
 Sexual activity: Never Birth control/protection: None Lifestyle  Physical activity Days per week: Not on file Minutes per session: Not on file  Stress: Not on file Relationships  Social connections Talks on phone: Not on file Gets together: Not on file Attends Jainism service: Not on file Active member of club or organization: Not on file Attends meetings of clubs or organizations: Not on file Relationship status: Not on file  Intimate partner violence Fear of current or ex partner: Not on file Emotionally abused: Not on file Physically abused: Not on file Forced sexual activity: Not on file Other Topics Concern  Not on file Social History Narrative  Not on file ALLERGIES: Patient has no known allergies. Review of Systems Unable to perform ROS: Other (MR/intellectual disability) Vitals:  
 10/06/20 2145 10/07/20 0046 BP: (!) 146/103 Pulse: (!) 109 81 Resp: 16 Temp: 97.8 °F (36.6 °C) SpO2: 99% Physical Exam 
Vitals signs reviewed. Constitutional:   
   General: She is not in acute distress. Appearance: Normal appearance. She is well-developed. HENT:  
   Head: Normocephalic and atraumatic. Nose:  
   Comments: Nasal cannula in place. Mouth/Throat:  
   Mouth: Mucous membranes are dry. Eyes:  
   Extraocular Movements: Extraocular movements intact. Conjunctiva/sclera: Conjunctivae normal.  
   Pupils: Pupils are equal, round, and reactive to light. Neck: Musculoskeletal: Normal range of motion and neck supple. Comments: +1 JVD bilaterally. Cardiovascular:  
   Rate and Rhythm: Normal rate. Rhythm irregularly irregular. Heart sounds: S1 normal and S2 normal. No murmur. No friction rub. No gallop. Pulmonary:  
   Effort: Pulmonary effort is normal. No accessory muscle usage or respiratory distress. Comments: Crackles in the lower lung bilaterally. No tachypnea, accessory muscle use, or increased work of breathing. Patient speaks quickly in full sentences without any evidence of distress. Abdominal:  
   General: There is no distension. Tenderness: There is no abdominal tenderness. Musculoskeletal: Normal range of motion. General: No tenderness. Comments: +1 pitting edema in the lower extremities bilaterally. Skin: 
   General: Skin is warm. Findings: No rash. Neurological:  
   General: No focal deficit present. Mental Status: She is alert. Mental status is at baseline. Sensory: No sensory deficit. Motor: No weakness. Psychiatric:     
   Speech: Speech normal.  
 
  
 
MDM Number of Diagnoses or Management Options Acute on chronic systolic congestive heart failure ([facility]):  
 Paroxysmal atrial fibrillation [facility]):  
Diagnosis management comments: Julian Valera is a 71 y.o. female with a history of MR/intellectual disability as well as dilated car myopathy with a depressed EF coming in from rehab for elevated BNP and CO2 on her maintenance blood work. Patient has a history of cardiomyopathy and has trace JVD in pitting edema, however has no evidence of increased work of breathing or respiratory distress. Patient is non-hypoxic on her 4 L of nasal cannula which she is on chronically. May have some mild increased fluid retention from baseline, but no evidence of significant CHF exacerbation. Will get screening EKG, chest x-ray, and blood work. May gently diurese patient here and plan for outpatient follow-up. Patient's troponin is elevated 0.13, she has had equivocal and elevated troponins in the past.  Patient's delta troponin is unchanged, not consistent with ACS. Patient was diuresed here. She has had no hypoxia, tachypnea, or significantly increased work of breathing. I suspect that she has may be minimal acute exacerbation component of her chronic systolic heart failure. Do not feel that she needs inpatient treatment at this time. She can follow-up as an outpatient with her primary physician and have her diuretics adjusted as needed. BNP is actually decreased from when she was last treated inpatient a little over a month ago. No fever or leukocytosis to suggest infectious process. No cough noted while here in the emergency department. Procedures Vitals: 
Patient Vitals for the past 12 hrs: 
 Temp Pulse Resp BP SpO2  
10/07/20 0046  81     
10/06/20 2145 97.8 °F (36.6 °C) (!) 109 16 (!) 146/103 99 % Medications ordered:  
Medications  
furosemide (LASIX) injection 40 mg (40 mg IntraVENous Given 10/6/20 2300) Lab findings: 
Recent Results (from the past 12 hour(s)) CBC WITH AUTOMATED DIFF  Collection Time: 10/06/20  9:55 PM  
 Result Value Ref Range WBC 7.6 4.6 - 13.2 K/uL  
 RBC 3.83 (L) 4.20 - 5.30 M/uL  
 HGB 12.8 12.0 - 16.0 g/dL HCT 39.3 35.0 - 45.0 % .6 (H) 74.0 - 97.0 FL  
 MCH 33.4 24.0 - 34.0 PG  
 MCHC 32.6 31.0 - 37.0 g/dL  
 RDW 15.9 (H) 11.6 - 14.5 % PLATELET 459 646 - 330 K/uL MPV 11.0 9.2 - 11.8 FL  
 NEUTROPHILS 70 40 - 73 % LYMPHOCYTES 18 (L) 21 - 52 % MONOCYTES 10 3 - 10 % EOSINOPHILS 2 0 - 5 % BASOPHILS 0 0 - 2 %  
 ABS. NEUTROPHILS 5.3 1.8 - 8.0 K/UL  
 ABS. LYMPHOCYTES 1.4 0.9 - 3.6 K/UL  
 ABS. MONOCYTES 0.8 0.05 - 1.2 K/UL  
 ABS. EOSINOPHILS 0.2 0.0 - 0.4 K/UL  
 ABS. BASOPHILS 0.0 0.0 - 0.1 K/UL  
 DF AUTOMATED METABOLIC PANEL, BASIC Collection Time: 10/06/20  9:55 PM  
Result Value Ref Range Sodium 143 136 - 145 mmol/L Potassium 3.8 3.5 - 5.5 mmol/L Chloride 104 100 - 111 mmol/L  
 CO2 35 (H) 21 - 32 mmol/L Anion gap 4 3.0 - 18 mmol/L Glucose 234 (H) 74 - 99 mg/dL BUN 35 (H) 7.0 - 18 MG/DL Creatinine 1.58 (H) 0.6 - 1.3 MG/DL  
 BUN/Creatinine ratio 22 (H) 12 - 20 GFR est AA 40 (L) >60 ml/min/1.73m2 GFR est non-AA 33 (L) >60 ml/min/1.73m2 Calcium 9.0 8.5 - 10.1 MG/DL  
CARDIAC PANEL,(CK, CKMB & TROPONIN) Collection Time: 10/06/20  9:55 PM  
Result Value Ref Range CK - MB 1.8 <3.6 ng/ml CK-MB Index 4.4 (H) 0.0 - 4.0 % CK 41 26 - 192 U/L Troponin-I, QT 0.13 (H) 0.0 - 0.045 NG/ML  
NT-PRO BNP Collection Time: 10/06/20  9:55 PM  
Result Value Ref Range NT pro-BNP 24,484 (H) 0 - 900 PG/ML  
EKG, 12 LEAD, INITIAL Collection Time: 10/07/20 12:42 AM  
Result Value Ref Range Ventricular Rate 81 BPM  
 Atrial Rate 81 BPM  
 QRS Duration 96 ms  
 Q-T Interval 402 ms QTC Calculation (Bezet) 466 ms Calculated R Axis -8 degrees Calculated T Axis -125 degrees Diagnosis Atrial fibrillation RSR' or QR pattern in V1 suggests right ventricular conduction delay Anterior infarct (cited on or before 07-JUL-2020) Abnormal ECG When compared with ECG of 25-AUG-2020 13:21, 
QRS axis shifted right QT has lengthened CARDIAC PANEL,(CK, CKMB & TROPONIN) Collection Time: 10/07/20  1:06 AM  
Result Value Ref Range CK - MB 1.7 <3.6 ng/ml CK-MB Index 3.3 0.0 - 4.0 % CK 52 26 - 192 U/L Troponin-I, QT 0.13 (H) 0.0 - 0.045 NG/ML  
 
 
EKG interpretation by ED Physician: Atrial fibrillation ventricular rate of 81 bpm.  Nonspecific T wave flattening, no acute ischemic changes. X-Ray, CT or other radiology findings or impressions: 
XR CHEST PORT    (Results Pending) Cardiomegaly and pulmonary vascular congestion. Disposition: 
Diagnosis: 1. Acute on chronic systolic congestive heart failure ([facility] 2. Paroxysmal atrial fibrillation ([facility] Disposition: Discharge Follow-up Information Follow up With Specialties Details Why Contact Info Miguel Monk MD Internal Medicine Schedule an appointment as soon as possible for a visit for office follow up [address] [place] [address] [phone] [facility] 
[phone] [facility] EMERGENCY DEPT Emergency Medicine  As needed, If symptoms worsen [address] [place] [id number] 
[phone] Patient's Medications Start Taking No medications on file Continue Taking ACETAMINOPHEN (TYLENOL) 325 MG TABLET    Take 2 Tabs by mouth every six (6) hours as needed for Pain or Fever. APIXABAN (ELIQUIS) 5 MG TABLET    Take 1 Tab by mouth two (2) times a day. ATORVASTATIN (LIPITOR) 80 MG TABLET    Take 1 Tab by mouth nightly. CARVEDILOL (COREG) 6.25 MG TABLET    Take 1 tablet by mouth two (2) times a day. FERROUS SULFATE 325 MG (65 MG IRON) TABLET    Take 1 Tab by mouth daily (with breakfast). FUROSEMIDE (LASIX) 20 MG TABLET    Take 1 Tab by mouth daily. Start from Sunday, August 30, 2020  INSULIN GLARGINE (LANTUS U-100 INSULIN) 100 UNIT/ML INJECTION    4 Units by SubCUTAneous route nightly. Hold if fingerstick blood sugar is less than 140 INSULIN LISPRO (HUMALOG) 100 UNIT/ML INJECTION    Check FSBS AC*HS For sugars between 160 and 200- Give 2 units SQ, For sugars between 201 and 250, Give 3 units SQ, For sugars Between 251 and 300, give 5 units SQ, For Sugars between 301 and 350, give 7 units SQ For sugars between 351 and 400, give 8 units SQ and contact PCP LEVOTHYROXINE (SYNTHROID) 100 MCG TABLET    Take 1 Tab by mouth every morning. PANTOPRAZOLE (PROTONIX) 40 MG TABLET    Take 1 tablet by mouth daily. POLYETHYLENE GLYCOL (MIRALAX) 17 GRAM PACKET    Take 1 Packet by mouth daily as needed for Constipation. SENNA-DOCUSATE (PERICOLACE) 8.6-50 MG PER TABLET    Take 2 Tabs by mouth nightly. These Medications have changed No medications on file Stop Taking No medications on file

## 2020-10-20 PROBLEM — J96.91 RESPIRATORY FAILURE WITH HYPOXIA (HCC): Status: ACTIVE | Noted: 2020-01-01

## 2020-10-20 NOTE — ED TRIAGE NOTES
PT arrived via ems. Per medic, call came out for low blood sugar, altered mental status. Medics found pt hypoxic with a sugar of 27. IV started, D10 given PT arrives blue and screaming. D50 given.

## 2020-10-20 NOTE — ED PROVIDER NOTES
EMERGENCY DEPARTMENT HISTORY AND PHYSICAL EXAM 
This was created with voice recognition software and transcription errors may be present. 8:48 AM 
Date: 10/20/2020 Patient Name: Marlon Whalen History of Presenting Illness Chief Complaint: 
 
History Provided By:  
 
HPI: Marlon Whalen is a 71 y.o. female past medical history of chronic kidney disease CHF diabetes dilated cardiomyopathy hypertension hypothyroidism paroxysmal A. fib who presents with hypoglycemia. EMS was called for blood glucose in the 30s at the nursing home. When they arrived a noted the patient had been given glucagon but the blood glucose was now in the 20s. They administered a D10 drip and the patient had a generalized seizure. She was seen by me immediately upon arrival she was postictal immediately gave her an amp of D50 her mental status increased from fairly unresponsive to agitated although her oxygenation on the monitor appeared to be in the 90s she had periods of apnea she had no gag reflex and she was grossly cyanotic and agitated so she was intubated for airway protection immediately by me. She is limited although she does appear to be a full code. PCP: Kinsey Melvin MD 
 
 
Past History Past Medical History: 
Past Medical History:  
Diagnosis Date  Cardiac echocardiogram 02/09/2015 EF 64%. No WMA. Mild LVH. Gr 2 DDfx. RVSP 30 mmHg. Mod-severe LAE. No significant valvular heart disease.  Cardiac nuclear imaging test, abnormal 02/09/2015 LV apical defect likely c/w prior infarction w/sm area of distal anterior romulo-infarct ischemia. EF 57%. Apical WMA, likely from prior infarct. Nondiagnostic EKG on pharm stress test.  
 Chronic kidney disease, stage III (moderate) ([address] 2/6/2012  Chronic renal disease, stage III ([address]  Congestive heart failure ([address] 2/6/2012  Congestive heart failure, unspecified 01/2012  Echocardiogram with severe global hypokinesis and EF 30% consistent with dilated cardiomyopathy.  DDD (degenerative disc disease), lumbar 9/10/2016  Diabetes mellitus ([facility] 01/2015 Presented with blood sugar of 637 and HbA1c 13.4  Diabetes mellitus with kidney complication ([facility] 8/0/4064  [facility] 2/6/2012  Hypertension  Hypothyroidism  Iron deficiency anemia 01/2015 Presented with Hb 5.1/ Hct 18.0. On Xarelto. No obvious source of bleeding. Did not follow-up for outpatient GI evaluation.  Low back pain 01/21/2015 Abdominal CT scan with thoracolumbar rotoscoliosis with severe multilevel degenerative disc disease and facet arthropathy; central canal stenosis at L4-L5.  Mental retardation 2/6/2012  Paroxysmal atrial fibrillation ([facility] 01/2012 Presented with atrial fibrillation with RVR; converted spontaneously to sinus rhythm during hospitalization and subsequently started on amiodarone and Xarelto. Past Surgical History: No past surgical history on file. Family History: No family history on file. Social History: 
Social History Tobacco Use  Smoking status: Never Smoker  Smokeless tobacco: Never Used Substance Use Topics  Alcohol use: No  
 Drug use: No  
 
 
Allergies: 
No Known Allergies Review of Systems Review of Systems Unable to perform ROS: Acuity of condition 10 point review of systems otherwise negative unless noted in HPI. Physical Exam  
 
 
Physical Exam 
Constitutional:   
   Comments: Initially postictal then became fairly agitated swatting combative grossly cyanotic to her face and lips although her oxygen saturation was in the mid 90s on the pulse oximetry HENT:  
   Head: Normocephalic. Nose: Nose normal.  
   Mouth/Throat:  
   Mouth: Mucous membranes are moist.  
Neck: Musculoskeletal: Normal range of motion. Cardiovascular:  
   Pulses: Normal pulses. Comments: Tachycardic and irregularly irregular Pulmonary:  
   Comments: Periods of apnea Abdominal:  
   General: Abdomen is flat. Musculoskeletal:  
   Comments: Gross deformity Skin: 
   General: Skin is warm. Capillary Refill: Capillary refill takes less than 2 seconds. Comments: Initially cyanotic now improved Neurological:  
   Comments: Generally confused postictal  
Psychiatric:  
   Comments: Untestable Diagnostic Study Results Vital Signs Visit Vitals BP (!) 145/91 Pulse 94 Temp (!) 94.1 °F (34.5 °C) Resp 17 Ht 5' 5\" (1.651 m) Wt 68 kg (150 lb) SpO2 100% BMI 24.96 kg/m² EKG: EKG shows A. fib at 82 with a normal axis normal intervals there is no ST elevation or depression no hypertrophy Labs:  
Imaging:  
 
Medical Decision Making ED Course: Progress Notes, Reevaluation, and Consults: 
 
 
Provider Notes (Medical Decision Making):  
 
Seen by me immediately upon arrival.  Given her hypoglycemia we immediately gave her some D50. As a stated above she became agitated cyanotic and combative. She was intubated for airway protection. She had periods of apnea. Intubate her using MAC 3 video laryngoscopy on my second attempt. The initial attempt used a MAC 4 however her airway was fairly anterior. I pretreated her with ketamine and took a look with the glide scope however I was only able to see the retinoids and not able to see the full cords. I then paralyzed her with rocuronium I then used a MAC 3 and was able to get a 7 5 tube to the cords however I was not able to pass it so swapped out for 7 oh tube and was able to pass out without significant difficulty. At no point did she desaturate during the procedure. Oxygen saturation remained 95 and above. Post procedure sedation we are using fentanyl and we have initiated sepsis protocol along with head CT chest abdomen pelvis Patient is now waking up she is nodding her head yes and no.   I was going to place ultrasound-guided right IJ central line however the patient is on Eliquis I did look in her groin and femoral access is reasonable but the IJ had a better view. Regardless the patient has an 18-gauge access in her arm and per nursing has very good veins some not sure she really needs a central line. She is intubated will discuss the case with ICU prior to placing on Patient did wake up somewhat. She currently became somewhat agitated she is hypothermic antibiotics of been started. Pending CTs for ICU admission He had grossly negative per my read. CT abdomen pelvis right-sided pleural effusion question aspiration. ICU team is down here they have evaluated the patient and are taking over care. Critical Care Time: The services I provided to this patient were to treat and/or prevent clinically significant deterioration that could result in the failure of one or more body systems and/or organ systems due to  resp arrest  
 
Services included the following: 
-reviewing nursing notes and old charts 
-vital sign assessments 
-direct patient care 
-medication orders and management 
-interpreting and reviewing diagnostic studies/labs 
-re-evaluations 
-documentation time Aggregate critical care time was 140  minutes, which includes only time during which I was engaged in work directly related to the patient's care as described above, whether I was at bedside or elsewhere in the Emergency Department. It did not include time spent performing other reported procedures or the services of residents, students, nurses, or advance practice providers. Sabine Devine MD  
3:11 PM 
 
 
 
Diagnosis Clinical Impression: No diagnosis found. Disposition: 
 
Patient's Medications Start Taking No medications on file Continue Taking ACETAMINOPHEN (TYLENOL) 325 MG TABLET    Take 2 Tabs by mouth every six (6) hours as needed for Pain or Fever. APIXABAN (ELIQUIS) 5 MG TABLET    Take 1 Tab by mouth two (2) times a day. ATORVASTATIN (LIPITOR) 80 MG TABLET    Take 1 Tab by mouth nightly. CARVEDILOL (COREG) 6.25 MG TABLET    Take 1 tablet by mouth two (2) times a day. FERROUS SULFATE 325 MG (65 MG IRON) TABLET    Take 1 Tab by mouth daily (with breakfast). FUROSEMIDE (LASIX) 20 MG TABLET    Take 1 Tab by mouth daily. Start from Sunday, August 30, 2020 INSULIN GLARGINE (LANTUS U-100 INSULIN) 100 UNIT/ML INJECTION    4 Units by SubCUTAneous route nightly. Hold if fingerstick blood sugar is less than 140 INSULIN LISPRO (HUMALOG) 100 UNIT/ML INJECTION    Check FSBS AC*HS For sugars between 160 and 200- Give 2 units SQ, For sugars between 201 and 250, Give 3 units SQ, For sugars Between 251 and 300, give 5 units SQ, For Sugars between 301 and 350, give 7 units SQ For sugars between 351 and 400, give 8 units SQ and contact PCP LEVOTHYROXINE (SYNTHROID) 100 MCG TABLET    Take 1 Tab by mouth every morning. PANTOPRAZOLE (PROTONIX) 40 MG TABLET    Take 1 tablet by mouth daily. POLYETHYLENE GLYCOL (MIRALAX) 17 GRAM PACKET    Take 1 Packet by mouth daily as needed for Constipation. SENNA-DOCUSATE (PERICOLACE) 8.6-50 MG PER TABLET    Take 2 Tabs by mouth nightly. These Medications have changed No medications on file Stop Taking No medications on file

## 2020-10-20 NOTE — H&P
[facility] Pulmonary, Critical Care, and Sleep Medicine Name: Gloria Rinaldi MRN: [id number] : 1955 Hospital: [facility] Date: 10/20/2020 Critical Care History & Physical 
 
 
IMPRESSION:  
· Acute hypoxic respiratory failure - intubated in the ED after hypoglycemic seizure for central cyanosis and desaturation · Seizure - suspect related to hypoglycemia, as low as 20 by EMS. Pt with no history of seizure disorder. · Hypoglycemia - Lowest value 20mg/dL, corrected in ED, most recent  mg/dL · Hypothermia - 94 degrees in ED, on esau hugger, consider infection. · Possible Sepsis -  Covid r/o pending. On droplet plus. Suspected sepsis in ED as Pt hypothermic, tachycardic. Lactate q4, Initially negative. WBC count not elevated. Urine negative. CXR w/o evidence of PNA, fluffy infiltrates bilaterally. Empiric Vanc/Zosyn given in the ED. Not currently on antibiotics. · Atrial fibrillation - hx of paroxysmal Afib on eliquis 5mg BID, held here and started on heparin for VTE PPX · Anasarca - Pt grossly volume overloaded today, 40mg IV lasix ordered - pt takes 20mg PO every day at home. · Congestive heart failure, systolic - EF 55-17%, LV not  on last echo (2020), will need echo and Cardiology consult at some point. BNP >30,000 here, volume overloaded. Coreg 6.25 PO daily at home. · Possible NSTEMI - Elevated troponin today,0.83, will trend. Plan echo, Cardiology consult. · Transaminitis - unknown etiology. Alk phos elevated, will get GGT, tylenol level, ammonia. D and T bilirubin not elevated. CT with concern for choledocholithiasis, will get RUQ US. Consider possible ascending cholangitis. · VALENTINA - Hx of CKD3, Baseline BUN/Cr 30/1.5; VALENTINA here at 59/1.94 
· Hx T2DM, initiated basal insulin regimen, 4U lantus QHS, which the patient takes at home. Currently NPO but will need sliding scale with meals/feeds eventually.   
 
Patient Active Problem List  
 Diagnosis Code  Diabetes mellitus with kidney complication ([facility]) O58.59  
 Paroxysmal atrial fibrillation ([facility]) I48.0  Congestive heart failure ([facility]) I50.9  Mental retardation F79  Chronic kidney disease, stage III (moderate) N18.30  Dilated cardiomyopathy ([facility]) I42.0  Scoliosis M41.9  Hypertension I10  
 Hypothyroidism E03.9  Iron deficiency anemia, severe requiring transfusions 1/2015 D50.0  Advance directive declined by patient Z68.5  DDD (degenerative disc disease), lumbar M51.36  
 Paroxysmal atrial fibrillation with RVR ([facility]) I48.0  CVA (cerebral vascular accident) ([facility] I63.9  VALENTINA (acute kidney injury) ([facility] [address] N17.9  Fall W19. Arcelia Captain  Conjunctivitis of both eyes H10.9  Hypothyroid E03.9  Cellulitis L03.90  Labia enlarged N90.60  Elevated troponin R77.8  Acute exacerbation of CHF (congestive heart failure) ([facility]) I50.9  Acute hypoxemic respiratory failure ([facility]) J96.01  
 CHF (congestive heart failure) ([facility]) I50.9 RECOMMENDATIONS:  
· Neuro: Pt sedated, recommend daily sedation holiday to assess mental status. CT head in the ED negative for bleeding. · Resp: Titrate FiO2 for SpO2 >90%; Daily CXR & ABG while intubated. Optimize bronchial hygiene. ·          SBT today attempt to liberate from vent today, aspiration precautions, keep HOB elevated 30' · I/D: q4 lactate, trend temp and WBC count. No obvious PNA on initial CXR, Urine clean. No indication for antibiotics at this time. · Hem/Onc:  Heparin drip for VTE ppx and anticoagulation for Afib. Holding eliquis. Daily coags and CBC · CVS: Atrial fibrillation with normal ventricular rate. Not hypotensive. Hx systolic CHF. Coreg 6.25 BID. Afib on eliquis at home, getting heparin IV here. Not currently on any rhythm control. · Metabolic:  BMP, Mg, Phos daily, replete as necessary per protocol. · Renal: VALENTINA here, CKD3 at baseline. Monitor BUN/Creatinine. Pt currently volume overloaded · Endocrine: Hypoglycemic event resolved. Hx T2DM, Start home basal insulin. Pt currently NPO, will need sliding scale with meals/feeds when appropriate. · GI: SUP with pantoprazole 40mg IV as patient takes this orally at home. · Musc/Skin: multiple skin tears, per report from ED nursing who knows this patient well, typical presentation for her. Skin cleansing, positioning and turning to prevent pressure ulcer formation. · Code Status: Full Best practice: · Sepsis Bundle per Hospital Protocol · Glycemic control · IHI ICU Bundles: ·  Dobson Bundle Followed and Vent Bundle Followed, Vent Day 1   
· [facility]:  
· VAP bundle, Aim to keep peak plateau pressure 20-62OI H2O 
· Daily sedation holiday as indicated · SBT as tolerated/appropriate · Stress ulcer prophylaxis. Pantoprazole 40mg IV · DVT prophylaxis. IV weight based heparin drip · Need for Lines, dobson assessed. · Restraints need. · Palliative care evaluation. Subjective/History: This patient has been seen and evaluated at the request of Dr. Sergio Arthur for Acute hypoxic respiratory failure. 10/20/20 Patient is a 71 y.o. female The patient is critically ill and can not provide additional history due to Ventilated. Per report from ED staff: 
EMS was called for blood glucose in the 30s at the nursing home. When they arrived a noted the patient had been given glucagon but the blood glucose was now in the 20s. They administered a D10 drip and the patient had a generalized seizure. She was seen immediately upon arrival she was postictal. Immediately was given an amp of D50 her mental status increased from fairly unresponsive to agitated although her oxygenation on the monitor appeared to be in the 90s she had periods of apnea she had no gag reflex and she was grossly cyanotic and agitated so she was intubated for airway protection Past Medical History:  
Diagnosis Date  Cardiac echocardiogram 02/09/2015 EF 64%. No WMA. Mild LVH. Gr 2 DDfx. RVSP 30 mmHg. Mod-severe LAE. No significant valvular heart disease.  Cardiac nuclear imaging test, abnormal 02/09/2015 LV apical defect likely c/w prior infarction w/sm area of distal anterior romulo-infarct ischemia. EF 57%. Apical WMA, likely from prior infarct. Nondiagnostic EKG on pharm stress test.  
 Chronic kidney disease, stage III (moderate) ([address] 2/6/2012  Chronic renal disease, stage III ([address]  Congestive heart failure ([address] 2/6/2012  Congestive heart failure, unspecified 01/2012 Echocardiogram with severe global hypokinesis and EF 30% consistent with dilated cardiomyopathy.  DDD (degenerative disc disease), lumbar 9/10/2016  Diabetes mellitus ([address] 01/2015 Presented with blood sugar of 637 and HbA1c 13.4  Diabetes mellitus with kidney complication ([address] 0/8/0561  Dilated cardiomyopathy ([address] 2/6/2012  Hypertension  Hypothyroidism  Iron deficiency anemia 01/2015 Presented with Hb 5.1/ Hct 18.0. On Xarelto. No obvious source of bleeding. Did not follow-up for outpatient GI evaluation.  Low back pain 01/21/2015 Abdominal CT scan with thoracolumbar rotoscoliosis with severe multilevel degenerative disc disease and facet arthropathy; central canal stenosis at L4-L5.  Mental retardation 2/6/2012  Paroxysmal atrial fibrillation ([address] 01/2012 Presented with atrial fibrillation with RVR; converted spontaneously to sinus rhythm during hospitalization and subsequently started on amiodarone and Xarelto. No past surgical history on file. Prior to Admission medications Medication Sig Start Date End Date Taking? Authorizing Provider  
apixaban (ELIQUIS) 5 mg tablet Take 1 Tab by mouth two (2) times a day. 8/28/20   Alissa Garcia MD  
acetaminophen (TYLENOL) 325 mg tablet Take 2 Tabs by mouth every six (6) hours as needed for Pain or Fever.  8/28/20   Alissa Garcia MD  
 polyethylene glycol (MIRALAX) 17 gram packet Take 1 Packet by mouth daily as needed for Constipation. 8/28/20   Ayah Saldaña MD  
insulin lispro (HUMALOG) 100 unit/mL injection Check FSBS AC*HS For sugars between 160 and 200- Give 2 units SQ, For sugars between 201 and 250, Give 3 units SQ, For sugars Between 251 and 300, give 5 units SQ, For Sugars between 301 and 350, give 7 units SQ For sugars between 351 and 400, give 8 units SQ and contact PCP 8/28/20   Ayah Saldaña MD  
insulin glargine (Lantus U-100 Insulin) 100 unit/mL injection 4 Units by SubCUTAneous route nightly. Hold if fingerstick blood sugar is less than 140 8/28/20   Ayah Saldaña MD  
furosemide (Lasix) 20 mg tablet Take 1 Tab by mouth daily. Start from Sunday, August 30, 2020 8/30/20   Ayah Saldaña MD  
senna-docusate (PERICOLACE) 8.6-50 mg per tablet Take 2 Tabs by mouth nightly. 7/20/20   Rehana Rocha MD  
ferrous sulfate 325 mg (65 mg iron) tablet Take 1 Tab by mouth daily (with breakfast). 7/21/20   Rehana Rocha MD  
atorvastatin (LIPITOR) 80 mg tablet Take 1 Tab by mouth nightly. 7/20/20   Rehana Rocha MD  
levothyroxine (SYNTHROID) 100 mcg tablet Take 1 Tab by mouth every morning. 7/21/20   Rehana Rocha MD  
pantoprazole (PROTONIX) 40 mg tablet Take 1 tablet by mouth daily. 1/22/15   Ryan Matias MD  
carvedilol (COREG) 6.25 mg tablet Take 1 tablet by mouth two (2) times a day. 1/22/15   Ryan Matias MD  
 
 
Current Facility-Administered Medications Medication Dose Route Frequency  amiodarone (CORDARONE) injection 150 mg  150 mg IntraVENous NOW  propofol (DIPRIVAN) 10 mg/mL infusion  10 mcg/kg/min IntraVENous TITRATE No Known Allergies Social History Tobacco Use  Smoking status: Never Smoker  Smokeless tobacco: Never Used Substance Use Topics  Alcohol use: No  
  
 
No family history on file. Review of Systems: Review of systems not obtained due to patient factors. Objective:  
Vital Signs:   
Visit Vitals BP (!) 133/91 Pulse 93 Temp (!) 94.5 °F (34.7 °C) Resp 17 Ht 5' 5\" (1.651 m) Wt 68 kg (150 lb) SpO2 100% BMI 24.96 kg/m² O2 Device: Ventilator Temp (24hrs), Av °F (34.4 °C), Min:93 °F (33.9 °C), Max:94.5 °F (34.7 °C) Intake/Output:  
Last shift:      10/20 0701 - 10/20 1900 In: -  
Out: 2250 [Urine:2250] Last 3 shifts: No intake/output data recorded. Intake/Output Summary (Last 24 hours) at 10/20/2020 1412 Last data filed at 10/20/2020 1233 Gross per 24 hour Intake  Output 2250 ml Net -2250 ml Ventilator Settings: 
Mode Rate Tidal Volume Pressure FiO2 PEEP Assist control, VC+   400 ml    35 % 5 cm H20 Peak airway pressure: 21 cm H2O Minute ventilation: 4.9 l/min ARDS network Guidelines:  
Lung protective strategy and Plateau  Pressure goal < 30 cm H2O goals Oxygenation Goals PaO2 55-80 mm Hg or SaO2 88-95% PH goal 7.30-7.45 VAP bundle: 
Reviewed. Mariaelena tube to suction at 20-30 cm Hg. Maintain Anasco tube with 5-10ml air every 4 hours Routine oral care every 4 hours Elevation of head of bed = 30 degree Daily sedation holiday and SBT evaluation starting at 6.00am. 
 
Physical Exam:  
 
General:  Intubated/Sedated Head:  Normocephalic, without obvious abnormality, atraumatic. Eyes:  Conjunctivae/corneas clear. PERRL, Nose: Nares normal. Septum midline. Mucosa normal. No drainage or sinus tenderness. Throat: Lips, mucosa, and tongue normal. Teeth and gums normal.  
Neck: Supple, symmetrical, trachea midline, no adenopathy, no carotid bruit and no JVD. Lungs:   Symmetrical chest rise; good AE bilat; Bilateral rales. Heart:  Irregularly Irregular rhythm, normal rate. Abdomen:   Soft, non-distended. Bowel sounds normal. No masses,  No organomegaly. Extremities: Extremities normal, atraumatic, no cyanosis or edema. Pulses: 2+ and symmetric all extremities. Skin: Skin color, texture, turgor normal. Multiple skin tears to extremities. No erythema or cellulitis. Neurologic: Sedated. Devices: ETT, OGT, 18Ga PIV x2, Marlow Data:  
 
Recent Results (from the past 24 hour(s)) METABOLIC PANEL, COMPREHENSIVE Collection Time: 10/20/20  8:39 AM  
Result Value Ref Range Sodium 145 136 - 145 mmol/L Potassium 3.6 3.5 - 5.5 mmol/L Chloride 105 100 - 111 mmol/L  
 CO2 30 21 - 32 mmol/L Anion gap 10 3.0 - 18 mmol/L Glucose 267 (H) 74 - 99 mg/dL BUN 59 (H) 7.0 - 18 MG/DL Creatinine 1.94 (H) 0.6 - 1.3 MG/DL  
 BUN/Creatinine ratio 30 (H) 12 - 20 GFR est AA 31 (L) >60 ml/min/1.73m2 GFR est non-AA 26 (L) >60 ml/min/1.73m2 Calcium 9.0 8.5 - 10.1 MG/DL Bilirubin, total 0.6 0.2 - 1.0 MG/DL  
 ALT (SGPT) 208 (H) 13 - 56 U/L  
 AST (SGOT) 263 (H) 10 - 38 U/L Alk. phosphatase 223 (H) 45 - 117 U/L Protein, total 6.8 6.4 - 8.2 g/dL Albumin 2.8 (L) 3.4 - 5.0 g/dL Globulin 4.0 2.0 - 4.0 g/dL A-G Ratio 0.7 (L) 0.8 - 1.7    
CBC WITH AUTOMATED DIFF Collection Time: 10/20/20  8:39 AM  
Result Value Ref Range WBC 8.5 4.6 - 13.2 K/uL  
 RBC 4.11 (L) 4.20 - 5.30 M/uL  
 HGB 13.9 12.0 - 16.0 g/dL HCT 43.5 35.0 - 45.0 % .8 (H) 74.0 - 97.0 FL  
 MCH 33.8 24.0 - 34.0 PG  
 MCHC 32.0 31.0 - 37.0 g/dL  
 RDW 15.6 (H) 11.6 - 14.5 % PLATELET 552 453 - 223 K/uL MPV 11.2 9.2 - 11.8 FL  
 NEUTROPHILS 73 40 - 73 % LYMPHOCYTES 19 (L) 21 - 52 % MONOCYTES 7 3 - 10 % EOSINOPHILS 1 0 - 5 % BASOPHILS 0 0 - 2 %  
 ABS. NEUTROPHILS 6.1 1.8 - 8.0 K/UL  
 ABS. LYMPHOCYTES 1.6 0.9 - 3.6 K/UL  
 ABS. MONOCYTES 0.6 0.05 - 1.2 K/UL  
 ABS. EOSINOPHILS 0.1 0.0 - 0.4 K/UL  
 ABS. BASOPHILS 0.0 0.0 - 0.1 K/UL  
 DF AUTOMATED CARDIAC PANEL,(CK, CKMB & TROPONIN) Collection Time: 10/20/20  8:39 AM  
Result Value Ref Range CK - MB 3.0 <3.6 ng/ml  CK-MB Index 4.3 (H) 0.0 - 4.0 %  
 CK 70 26 - 192 U/L Troponin-I, QT 0.86 (H) 0.0 - 0.045 NG/ML  
NT-PRO BNP Collection Time: 10/20/20  8:39 AM  
Result Value Ref Range NT pro-BNP 30,339 (H) 0 - 900 PG/ML  
POC LACTIC ACID Collection Time: 10/20/20  8:55 AM  
Result Value Ref Range Lactic Acid (POC) 1.59 0.40 - 2.00 mmol/L  
POC G3 Collection Time: 10/20/20  9:13 AM  
Result Value Ref Range Device: VENT    
 FIO2 (POC) 50 % pH (POC) 7.36 7.35 - 7.45    
 pCO2 (POC) 49.4 (H) 35.0 - 45.0 MMHG  
 pO2 (POC) 159 (H) 80 - 100 MMHG  
 HCO3 (POC) 28.0 (H) 22 - 26 MMOL/L  
 sO2 (POC) 99 (H) 92 - 97 % Base excess (POC) 2 mmol/L Mode ASSIST CONTROL Tidal volume 400 ml Set Rate 15 bpm  
 PEEP/CPAP (POC) 5 cmH2O  
 PIP (POC) 21 Allens test (POC) N/A Inspiratory Time 0.9 sec Total resp. rate 15 Site RIGHT RADIAL Specimen type (POC) ARTERIAL Performed by Snehal Lawton EKG, 12 LEAD, INITIAL Collection Time: 10/20/20  9:25 AM  
Result Value Ref Range Ventricular Rate 82 BPM  
 Atrial Rate 258 BPM  
 QRS Duration 104 ms Q-T Interval 438 ms QTC Calculation (Bezet) 511 ms Calculated R Axis -20 degrees Calculated T Axis -126 degrees Diagnosis Atrial fibrillation with premature ventricular or aberrantly conducted  
complexes Incomplete right bundle branch block Anterior infarct (cited on or before 07-JUL-2020) Prolonged QT Abnormal ECG When compared with ECG of 07-OCT-2020 00:42, No significant change was found Confirmed by Armen Coffman ([phone]) on 10/20/2020 10:56:02 AM 
  
GLUCOSE, POC Collection Time: 10/20/20 10:10 AM  
Result Value Ref Range Glucose (POC) 87 70 - 110 mg/dL URINALYSIS W/ RFLX MICROSCOPIC Collection Time: 10/20/20 10:37 AM  
Result Value Ref Range Color YELLOW Appearance CLEAR Specific gravity 1.011 1.005 - 1.030    
 pH (UA) 6.5 5.0 - 8.0 Protein 100 (A) NEG mg/dL Glucose Negative NEG mg/dL Ketone Negative NEG mg/dL Bilirubin Negative NEG Blood TRACE (A) NEG Urobilinogen 0.2 0.2 - 1.0 EU/dL Nitrites Negative NEG Leukocyte Esterase Negative NEG    
URINE MICROSCOPIC ONLY Collection Time: 10/20/20 10:37 AM  
Result Value Ref Range WBC 4 to 6 0 - 4 /hpf  
 RBC 6 to 8 0 - 5 /hpf Epithelial cells 1+ 0 - 5 /lpf Bacteria FEW (A) NEG /hpf Mucus 1+ (A) NEG /lpf Hyaline cast 2 to 4 0 - 2 /lpf SARS-COV-2 Collection Time: 10/20/20 12:10 PM  
Result Value Ref Range SARS-CoV-2 PENDING Specimen source Nasopharyngeal    
 Specimen type NP Swab Recent Labs 10/20/20 
0913 FIO2I 50 HCO3I 28.0*  
PCO2I 49.4* PHI 7.36  
PO2I 159* Telemetry:AFIB, rate controlled Imaging: 
I have personally reviewed the patients radiographs and have reviewed the reports: CXR 10/20/2020:  
IMPRESSION: 
  
Somewhat low-lying endotracheal tube which roughly 2 cm retraction is 
recommended. 
  
Suspected interstitial infiltrate/edema and retrocardiac opacity likely due to 
pleural effusion and underlying atelectasis or infiltrate. Mid to lower lung 
streaky densities, atelectasis versus infiltrate. These findings are similar to 
prior. Imaging follow-up recommended. CT HEAD/CHEST/ABD/PELVIS 10/20/2020: 
CT Head: IMPRESSION: 
  
No acute intracranial abnormality. Note: If clinical concern of acute stroke, MRI with diffusion weighted images is 
recommended for better evaluation. 
  
Right frontal encephalomalacia from remote infarct. 
  
Thank you for your referral. 
 
CT Abd/Pelvis: IMPRESSION:  
  
1. Moderate ascites, mesenteric stranding and marked anasarca. This makes CT 
evaluation very limited. 
  
2. Gallbladder hydrops with small layering tiny calculi. Moderate common bile 
duct dilatation with questionable elongated intraluminal hyperdensity in the 
distal common bile duct, concerning choledocholithiasis. Regarding the patient's situation, MRCP probably not able to obtained in both quality. Clinical 
correlation and ERCP could be considered. 
  
3. Cardiomegaly and bilateral pleural effusions. 
  
4. Fullness of uterine fundus with punctated calcifications. Recommend 
ultrasound for possible fibroids. 
  
Thank you for this referral.  
 
 
 
  
  
Total of  60  min critical care time spent at bedside during the course of care providing evaluation,management and care decisions and ordering appropriate treatment related to critical care problems exclusive of procedures. The reason for providing this level of medical care for this critically ill patient was due a critical illness that impaired one or more vital organ systems such that there was a high probability of imminent or life threatening deterioration in the patients condition. This care involved high complexity decision making to assess, manipulate, and support vital system functions, to treat this degree vital organ system failure and to prevent further life threatening deterioration of the patients condition. Meghan Gallardo MD 
[facility] PGY-1, Emergency Medicine

## 2020-10-21 NOTE — ED NOTES
Bedside and verbal shift report given by Gordon Escalera RN (off going nurse) to Geetha Pedro RN (oncoming nurse). Report included the following information SBAR and ED Summary.

## 2020-10-21 NOTE — PROGRESS NOTES
[facility]. Pulmonary, Critical Care, and Sleep Medicine Name: Laverne Edwards MRN: [id number] : 1955 Hospital: [address] [place] [address] Dr Date: 10/21/2020  Admission Date: 10/20/2020 Chart and notes reviewed. Data reviewed. I have evaluated all findings. [x]I have reviewed the flowsheet and previous days notes. [x]The patient is unable to give any meaningful history or review of systems because the patient is: 
[x]Intubated [x]Sedated  
[]Unresponsive [x]The patient is critically ill on     
[x]Mechanical ventilation []Pressors []BiPAP [] Interval HPI: 
70 YO F with PMHx of CKD, CHF (EF 20-25%), Afib on Eliquis and DM (on insulin) who presented via EMS for unconsciousness and undetectable blood glucose. It was reportedly in the 20s and 30s. It is unclear whether she had recently been given insulin. During transit she started having a generalized tonic clonic seizure. On arrival she was post ictal and was given D10 infusion. She was extremely cyanotic per the ED staff despite having 100% O2 saturations. Her mental status remained poor and fluctuated between unconscious and very agitation so she was intubated. CXR showed possible aspiration, she was give broad spectrum ABX. CT head was negative. CT abd/pelvis with ascites and anasarca but no acute pathology. Labs indicated a mild VALENTINA and elevated BNP, mild trop elevation Pt was diuresed overnight, net negative 3549mL. CXR slightly appears better today. Runs of tachycardia yesterday, at one point BP 37F systolic with RVR. Of note did not get her home carvedilol as ordered. EF 7%-9% calculated on Echo today, awaiting final read. D10 added at 25 mL/hr for prevention of hypoglycemia. K and Mg repleted. AST/ALT coming down, awaiting RUQ US Subjective 10/21/20 Hospital Day: 2 Vent Day: 2 Overnight events:D10 at 25mL/hr added, episode pressure dropped into the 22N systolic with RVR. Mentation/Activity: Deeply sedated on 5 versed Respiratory/ Secretions: n/a Hemodynamics: Runs of tachycardia/RVR to the 130s-140s, on carvedilol, however did not get dose yesterday evening. Anticoagulated, no rhythm control. Will consult cardiology today Urine output, bowel: 4100mL total output in last 24 Diet: NPO with tube feeds/meds Need for procedures: n/a 
           
ROS:Review of systems not obtained due to patient factors. Events and notes from last 24 hours reviewed. Care plan discussed on multidisciplinary rounds. Patient Active Problem List  
Diagnosis Code  Diabetes mellitus with kidney complication ([facility]) R90.25  
 Paroxysmal atrial fibrillation ([facility]) I48.0  Congestive heart failure ([facility]) I50.9  Mental retardation F79  Chronic kidney disease, stage III (moderate) N18.30  Dilated cardiomyopathy ([facility]) I42.0  Scoliosis M41.9  Hypertension I10  
 Hypothyroidism E03.9  Iron deficiency anemia, severe requiring transfusions 2015 D50.0  Advance directive declined by patient Z68.5  DDD (degenerative disc disease), lumbar M51.36  
 Paroxysmal atrial fibrillation with RVR ([facility]) I48.0  CVA (cerebral vascular accident) ([facility] [address] I63.9  VALENTINA (acute kidney injury) ([facility] [address] N17.9  Fall W19. Dulcy Cole  Conjunctivitis of both eyes H10.9  Hypothyroid E03.9  Cellulitis L03.90  Labia enlarged N90.60  Elevated troponin R77.8  Acute exacerbation of CHF (congestive heart failure) ([facility]) I50.9  Acute hypoxemic respiratory failure ([facility]) J96.01  
 CHF (congestive heart failure) ([facility]) I50.9  Respiratory failure with hypoxia ([facility]) J96.91 Vital Signs: 
Visit Vitals BP (!) 117/95 Pulse (!) 117 Temp 98.6 °F (37 °C) Resp 18 Ht 5' 5\" (1.651 m) Wt 68 kg (150 lb) SpO2 100% BMI 24.96 kg/m² O2 Device: Ventilator Temp (24hrs), Av °F (36.7 °C), Min:93 °F (33.9 °C), Max:99.7 °F (37.6 °C) Intake/Output: Last shift:      10/21 0701 - 10/21 1900 In: 100 [I.V.:100] Out: - Last 3 shifts: 10/19 1901 - 10/21 0700 In: 550.5 [I.V.:550.5] Out: 4100 [PDBFB:5186] Intake/Output Summary (Last 24 hours) at 10/21/2020 0920 Last data filed at 10/21/2020 5972 Gross per 24 hour Intake 650.5 ml Output 4100 ml Net -3449.5 ml  
  
 
Ventilator Settings: 
Ventilator Mode: Assist control, VC+ Respiratory Rate Back-Up Rate: 12 Insp Time (sec): 1.2 sec I:E Ratio: 1:3.2 Ventilator Volumes Vt Set (ml): 400 ml Vt Exhaled (Machine Breath) (ml): 333 ml Ve Observed (l/min): 4.36 l/min Ventilator Pressures PIP Observed (cm H2O): 18 cm H2O Plateau Pressure (cm H2O): 17 cm H2O 
MAP (cm H2O): 8.4 PEEP/VENT (cm H2O): 6 cm H20 Current Facility-Administered Medications Medication Dose Route Frequency  midazolam in normal saline (VERSED) 1 mg/mL infusion  0-10 mg/hr IntraVENous TITRATE  fentaNYL (PF) 900 mcg/30 ml infusion soln  0-200 mcg/hr IntraVENous TITRATE  sodium chloride (NS) flush 5-40 mL  5-40 mL IntraVENous Q8H  
 chlorhexidine (PERIDEX) 0.12 % mouthwash 10 mL  10 mL Oral Q12H  
 heparin (porcine) 25,000 units in 0.45% saline 250 ml infusion  18-36 Units/kg/hr IntraVENous TITRATE  levothyroxine (SYNTHROID) tablet 100 mcg  100 mcg Oral 6am  
 carvediloL (COREG) tablet 6.25 mg  6.25 mg Oral BID  atorvastatin (LIPITOR) tablet 80 mg  80 mg Oral QHS  pantoprazole (PROTONIX) 40 mg in 0.9% sodium chloride 10 mL injection  40 mg IntraVENous DAILY  insulin glargine (LANTUS) injection 4 Units  4 Units SubCUTAneous QHS  dextrose 10% infusion  25 mL/hr IntraVENous CONTINUOUS Telemetry: []Sinus []A-flutter []Paced [x]A-fib []Multiple PVCs Physical Exam:  
  
General: Intubated/sedated; HEENT:  Anicteric sclerae; pink palpebral conjunctivae; mucosa moist 
Resp:  Symmetrical chest expansion, no accessory muscle use; good airway entry; no rales/ wheezing/ rhonchi noted CV:  S1, S2 present; irregularly irregular rhythm with tachycardia in the 115-125 range GI:  Abdomen soft, non-tender; (+) active bowel sounds Extremities:  +2 pulses on all extremities; no cyanosis/ clubbing noted, 2+ pitting edema bilaterally. B/l wrist restraints Skin:  Warm; no rashes/ lesions noted, normal turgor/cap refill Neurologic:  Non-focal 
Devices:  OGT,  ETT, dobson DATA: 
MAR reviewed and pertinent medications noted or modified as needed Labs: 
Recent Labs 10/21/20 
0440 10/20/20 
2639 WBC 9.0 8.5 HGB 13.3 13.9 HCT 40.0 43.5  216 Recent Labs 10/21/20 
0440 10/20/20 
1336 10/20/20 
8989 * 148* 145  
K 3.2* 3.3* 3.6  109 105 CO2 31 34* 30  
* 41* 267* BUN 53* 53* 59* CREA 1.76* 1.60* 1.94* CA 8.5 8.8 9.0 MG 1.9 1.9  --   
PHOS 3.7 3.6  --   
ALB 2.1*  --  2.8* *  --  208* No results for input(s): PH, PCO2, PO2, HCO3, FIO2 in the last 72 hours. Recent Labs 10/21/20 
9615 10/20/20 
1012 FIO2I 35 50 HCO3I 28.1* 28.0*  
PCO2I 32.1* 49.4* PHI 7.55* 7.36  
PO2I 125* 159* Imaging: 
[x]   I have personally reviewed the patients radiographs and reports XR Results (most recent): CXR Results  (Last 48 hours) 10/21/20 0515  XR CHEST PORT Final result Impression:  Impression: 1. Little overall change. Endotracheal tube in satisfactory position. Ongoing  
multifocal airspace opacities with small pleural effusions, similar to prior. Narrative:     
Examination: Portable AP chest   
   
History: Intubated Comparison: Recent comparison Findings: Endotracheal tube is 2.3 cm above the michele. NG tube terminates  
inferior to the field-of-view. Multifocal airspace opacities which are  
predominantly interstitial are again seen. There is a possible small bilateral  
pleural effusion. Overall, little interval change. No pneumothorax.  Heart heart  
size is enlarged but stable. 10/20/20 0926  XR CHEST PORT Final result Impression:  IMPRESSION:  
   
Somewhat low-lying endotracheal tube which roughly 2 cm retraction is  
recommended. Suspected interstitial infiltrate/edema and retrocardiac opacity likely due to  
pleural effusion and underlying atelectasis or infiltrate. Mid to lower lung  
streaky densities, atelectasis versus infiltrate. These findings are similar to  
prior. Imaging follow-up recommended. Narrative:  EXAMINATION: Chest single view INDICATION: Sepsis COMPARISON: 10/6/2020 FINDINGS: Single frontal view of the chest obtained. ETT tip 1.3 cm above  
michele. Mildly enlarged cardiac silhouette. Prominent perihilar and beyond  
pulmonary interstitium. Bilateral mid to lower lung streaky opacities. Nonspecific retrocardiac opacity. No definite pneumothorax identified. No  
obvious acute osseous findings. CT Results (most recent): 
n/a IMPRESSION:  
· Acute hypoxic respiratory failure - intubated in the ED after hypoglycemic seizure for central cyanosis and desaturation. Likely 2/2 worsening CHF, echo with decreased EF 7-9%. · Anasarca - Pt grossly volume overloaded today, 40mg IV lasix ordered - pt takes 20mg PO every day at home. 10/21 - additional lasix ordered, diuresed 3.5L yesterday. · Congestive heart failure, systolic - EF 84-79%, LV not  on last echo (7/8/2020), will need echo and Cardiology consult at some point. BNP >30,000 here, volume overloaded. Coreg 6.25 PO daily at home. · Possible NSTEMI - Elevated troponin today,0.83, will trend. Plan echo, Cardiology consult. · Seizure - suspect related to hypoglycemia, as low as 20 by EMS. Pt with no history of seizure disorder. · Hypoglycemia - Lowest value 20mg/dL, corrected in ED. On D10 after low in 40s last night. Most recent  mg/dL · Hypothermia - 94 degrees in ED, resolved on esau hugger overnight, no evidence of infection. · Possible Sepsis -  Covid r/o pending. On droplet plus. Suspected sepsis in ED. Empiric Vanc/Zosyn given in the ED. Not currently on antibiotics. 10/21- Sepsis less likely - clean urine, pulm edema on CXR, temp normalized. No WBC count · Atrial fibrillation - hx of paroxysmal Afib on eliquis 5mg BID, held here and started on heparin for VTE PPX · Transaminitis - improving today, likely congestion related to worsening heart failure. · VALENTINA - Hx of CKD3, Baseline BUN/Cr 30/1.5; VALENTINA improving today · Hx T2DM, initiated basal insulin regimen, 4U lantus QHS, which the patient takes at home. Currently NPO but will need sliding scale with meals/feeds eventually. Patient Active Problem List  
Diagnosis Code  Diabetes mellitus with kidney complication ([facility]) K87.97  
 Paroxysmal atrial fibrillation ([facility]) I48.0  Congestive heart failure ([facility]) I50.9  Mental retardation F79  Chronic kidney disease, stage III (moderate) N18.30  Dilated cardiomyopathy ([facility]) I42.0  Scoliosis M41.9  Hypertension I10  
 Hypothyroidism E03.9  Iron deficiency anemia, severe requiring transfusions 1/2015 D50.0  Advance directive declined by patient Z68.5  DDD (degenerative disc disease), lumbar M51.36  
 Paroxysmal atrial fibrillation with RVR ([facility]) I48.0  CVA (cerebral vascular accident) ([facility] [address] I63.9  VALENTINA (acute kidney injury) ([facility] [address] N17.9  Fall W19. Anai Sabot  Conjunctivitis of both eyes H10.9  Hypothyroid E03.9  Cellulitis L03.90  Labia enlarged N90.60  Elevated troponin R77.8  Acute exacerbation of CHF (congestive heart failure) ([facility]) I50.9  Acute hypoxemic respiratory failure ([facility]) J96.01  
 CHF (congestive heart failure) ([facility]) I50.9  Respiratory failure with hypoxia ([facility]) J96.91  
 
  
RECOMMENDATIONS:  
Neuro:Titrate sedation for RASS goal 0 to -1, daily sedation holiday. Restraints wrist b/l Pulm: Aspiration precautions, HOB>30'. VAP Bundle CVS: Give home carvedilol for afib with tachycardia Echo results with EF 7%, uptrending troponin, suspect NSTEMI/worsening CHF, Plan to consult Cards. Monitor HD, MAP goal >65. GI: NPO. Diet tube feeds per nutrition. Renal: Trend Cr, UOP. Dobson draining paddy urine Hem/Onc: aPTT >180, heparin drip off, switched to SQH, holding AM dose. Recheck aPTT this afternoon. Trend H/H, monitor for s/o active bleeding. Daily CBC. I/D: Remains afebrile, without elevated WBC or infectious source here. Trend WBCs and temperature curve. Metabolic: Mg and K repleted. Continue Daily BMP, mag, phos. Trend lytes and replace per protocol. Endocrine:Q6 glucoses. SSI. Avoid hypoglycemia. D10 at 25mL/hr, Most recent glucose 338. Musc/Skin: pressure ulcer prevention, PT/OT/SLP Full Code Discussed in interdisciplinary rounds Best practice : 
 
Glycemic control IHI ICU bundles: Dobosn Bundle Followed and Vent Bundle Followed, Vent Day 2 [facility] Vent patients- VAP bundle, aim to keep peak plateau pressure 00-89RA H2O Sress ulcer prophylaxis. DVT prophylaxis. Need for Lines, dobson assessed. Restraints need. Palliative care evaluation. High complexity decision making was performed during this consultation and evaluation. [x]       Pt is at high risk for further organ failure and dysfunction. Critical care time spent:   40 minutes with patient exclusive of procedures. Lorelei Coronado MD 
[facility] PGY-1, Emergency Medicine 10/21/2020

## 2020-10-21 NOTE — ROUTINE PROCESS
Bedside shift change report given to Louie Villalpando Rn (oncoming nurse) by Emmett Giles Rn (offgoing nurse). Report included the following information SBAR, Kardex, ED Summary, Intake/Output, MAR, Recent Results and Cardiac Rhythm A Fib.

## 2020-10-21 NOTE — PROGRESS NOTES
Reason for Admission:  Respiratory failure with hypoxia ([facility] [address] [J96.91] RUR Score:    25% Plan for utilizing home health:    Patient is LTC at [facility] Likelihood of Readmission:   Moderate Do you (patient/family) have any concerns for transition/discharge?  no 
 
Transition of Care Plan:    
 
Initial assessment completed with sister Joya via phone. Cognitive status of patient: Unable to assess. patient is intubated. Face sheet information confirmed:  yes. sister Joya ([phone]) provided needed information. This patient resides at [facility]. Prior to hospitalization, patient was considered to be independent with ADLs/IADLS : no . If not independent,  patient needs assist with : dressing, bathing, food preparation, cooking, toileting and grooming Patient has a current ACP document on file: no The patient will need medicaid stretcher transport upon discharge. This patient is on dialysis :no 
 
 
Currently, the discharge plan is LTC. Mrs. Rey Zazueta stated \"Yes, we want her to return to [facility]. \" 
 
Writer called Vitaliy Keane with admissions and verified that patient is able to return at discharge when medically stable. CM will continue to assist with transitional needs for a safe discharge. Patient's current insurance: [facility] Medicare/[employer] medicare and Ambreen Dennis Care Management Interventions PCP Verified by CM: Yes Mode of Transport at Discharge: BLS Transition of Care Consult (CM Consult): Discharge Planning, [facility] Physical Therapy Consult: No 
Occupational Therapy Consult: No 
Current Support Network: [address] [place] [address] Discharge Location Discharge Placement: [address] [facility] TAMIKO Mcdaniel, RN Pager # [phone] Care Manager

## 2020-10-21 NOTE — ROUTINE PROCESS
TRANSFER - IN REPORT: 
 
Verbal report received from MILA Navarro on [facility]  being received from ED for urgent transfer Report consisted of patients Situation, Background, Assessment and  
Recommendations(SBAR). Information from the following report(s) SBAR, Kardex, MAR, Recent Results and Cardiac Rhythm A Fib was reviewed with the receiving nurse. Opportunity for questions and clarification was provided. Assessment completed upon patients arrival to unit and care assumed.

## 2020-10-21 NOTE — ED NOTES
PCA syringe of Fentanyl from pt bedside secured in pt specific med bin in case of need at later time.

## 2020-10-22 NOTE — PROGRESS NOTES
PCCM Update: 
Extensive conversation with sister (Karen Pak) regarding poor prognosis. Goals of care discussed earlier in the day with Palliative Care team. She was made DNR. We have discussed that her sister is in acute respiratory failure on the mechanical ventilator with severe biventricular heart failure with EF 7-9%. She is wheel-chair bound and debilitated. Her likelihood of survival on the current clinical course is grim. She is not a candidate for surgical intervention. Her heart rate remains elevated, thus I reached back out to her sister regarding escalation of care vs. comfort measures. At this time, her sister, Jasmin Carter, has decided to make her comfort care. Will initiate comfort measures on her arrival.   
 
 
Tabby Worrell NP 
 
10/22/20 Pulmonary, Critical Care Medicine [facility]

## 2020-10-22 NOTE — ROUTINE PROCESS
Received pt in bed. Pt calm Mobility--bedrest 
Respiratory-- ET vent setting mode A/C R 12  FIO2 28% peep 6 GI--OG clamped Gu--dobson Drips--Heparin 18unit/kg/unit, milrinone 0.375mcg, verse 3mg IV D10 at 10cc/hr

## 2020-10-22 NOTE — PROGRESS NOTES
[facility]. Pulmonary, Critical Care, and Sleep Medicine Name: Mike Rodrigez MRN: [id number] : 1955 Hospital: [facility] Date: 10/22/2020  Admission Date: 10/20/2020 Chart and notes reviewed. Data reviewed. I have evaluated all findings. [x]I have reviewed the flowsheet and previous days notes. [x]The patient is unable to give any meaningful history or review of systems because the patient is: 
[x]Intubated [x]Sedated  
[]Unresponsive [x]The patient is critically ill on     
[x]Mechanical ventilation []Pressors []BiPAP [] Interval HPI: 
72 YO F with PMHx of CKD, CHF (EF <15%), Afib on Eliquis and DM (on insulin) who presented via EMS for unconsciousness and undetectable blood glucose. It was reportedly in the 20s and 30s. It is unclear whether she had recently been given insulin. During transit she started having a generalized tonic clonic seizure. On arrival she was post ictal and was given D10 infusion. She was extremely cyanotic per the ED staff despite having 100% O2 saturations. Her mental status remained poor and fluctuated between unconscious and very agitation so she was intubated. CXR showed possible aspiration, she was give broad spectrum ABX. CT head was negative. CT abd/pelvis with ascites and anasarca but no acute pathology. Labs indicated a mild VALENTINA and elevated BNP, mild trop elevation. Continuing intermittent diuresis, milrinone infusion for inotropic support, and potential for adding Digoxin for rate control. Remains on D10 for low blood glucose. Will initiate feeds and can deescalate D10 gtt once BG normalize. Repleting K. AST/ALT coming down, awaiting RUQ US. She is COVID negative. Palliative care consulted. Now DNR Subjective 10/22/20 Hospital Day: 3 Vent Day: 3 Overnight events: Afib RVR overnight 120's-130's. Received Digoxin 250 mcg x1. Mentation/Activity: sedated on mechanical ventilator. Responds to noxious stimuli. Respiratory/ Secretions: minimal. Vented. Hemodynamics: Afib low 100's. irregularly irregular rhythm. +ectopy. Mult runs of PVC's and infrequent runs of v-tach. 2-3 beats. BP stable. Received Dig overnight Urine output, bowel: 1100 mL total output in last 24 Diet: NPO, will start TF today Need for procedures: n/a 
           
ROS:Review of systems not obtained due to patient factors. Events and notes from last 24 hours reviewed. Care plan discussed on multidisciplinary rounds. Patient Active Problem List  
Diagnosis Code  Diabetes mellitus with kidney complication ([facility]) N07.73  
 Paroxysmal atrial fibrillation ([facility]) I48.0  Congestive heart failure ([facility]) I50.9  Mental retardation F79  Chronic kidney disease, stage III (moderate) N18.30  Dilated cardiomyopathy ([facility]) I42.0  Scoliosis M41.9  Hypertension I10  
 Hypothyroidism E03.9  Iron deficiency anemia, severe requiring transfusions 2015 D50.0  Advance directive declined by patient Z68.5  DDD (degenerative disc disease), lumbar M51.36  
 Paroxysmal atrial fibrillation with RVR ([facility]) I48.0  CVA (cerebral vascular accident) ([facility] [address] I63.9  VALENTINA (acute kidney injury) ([facility] [address] N17.9  Fall W19. Demetria Agent  Conjunctivitis of both eyes H10.9  Hypothyroid E03.9  Cellulitis L03.90  Labia enlarged N90.60  Elevated troponin R77.8  Acute exacerbation of CHF (congestive heart failure) ([facility]) I50.9  Acute hypoxemic respiratory failure ([facility]) J96.01  
 CHF (congestive heart failure) ([facility]) I50.9  Respiratory failure with hypoxia ([facility]) J96.91 Vital Signs: 
Visit Vitals BP (!) 117/58 Pulse (!) 111 Temp 97.3 °F (36.3 °C) Resp 11 Ht 5' 5\" (1.651 m) Wt 68 kg (150 lb) SpO2 100% BMI 24.96 kg/m² O2 Device: Endotracheal tube, Ventilator Temp (24hrs), Av.4 °F (36.9 °C), Min:97.3 °F (36.3 °C), Max:99.3 °F (37.4 °C) Intake/Output:  
Last shift:      No intake/output data recorded. Last 3 shifts: 10/20 1901 - 10/22 0700 In: 1455.3 [I.V.:1365.3] Out: 2350 [Urine:2350] Intake/Output Summary (Last 24 hours) at 10/22/2020 0830 Last data filed at 10/22/2020 0630 Gross per 24 hour Intake 804.75 ml Output 1100 ml Net -295.25 ml Ventilator Settings: 
Ventilator Mode: Assist control, VC+ Respiratory Rate Back-Up Rate: 10 Insp Time (sec): 1 sec I:E Ratio: 1:3.2 Ventilator Volumes Vt Set (ml): 400 ml Vt Exhaled (Machine Breath) (ml): 406 ml Ve Observed (l/min): 4.05 l/min Ventilator Pressures PIP Observed (cm H2O): 19 cm H2O Plateau Pressure (cm H2O): 17 cm H2O 
MAP (cm H2O): 8.2 PEEP/VENT (cm H2O): 6 cm H20 Current Facility-Administered Medications Medication Dose Route Frequency  influenza vaccine 2020-21 (6 mos+)(PF) (FLUARIX/FLULAVAL/FLUZONE QUAD) injection 0.5 mL  0.5 mL IntraMUSCular PRIOR TO DISCHARGE  potassium chloride 10 mEq in 100 ml IVPB  10 mEq IntraVENous Q1H  
 permethrin (ACTICIN) 5 % cream   Topical NOW  midazolam in normal saline (VERSED) 1 mg/mL infusion  0-10 mg/hr IntraVENous TITRATE  fentaNYL (PF) 900 mcg/30 ml infusion soln  0-200 mcg/hr IntraVENous TITRATE  milrinone (PRIMACOR) 20 MG/100 ML D5W infusion  0.375 mcg/kg/min IntraVENous CONTINUOUS  
 carvediloL (COREG) tablet 3.125 mg  3.125 mg Oral BID  heparin (porcine) 25,000 units in 0.45% saline 250 ml infusion  18-36 Units/kg/hr IntraVENous TITRATE  sodium chloride (NS) flush 5-40 mL  5-40 mL IntraVENous Q8H  
 chlorhexidine (PERIDEX) 0.12 % mouthwash 10 mL  10 mL Oral Q12H  levothyroxine (SYNTHROID) tablet 100 mcg  100 mcg Oral 6am  
 atorvastatin (LIPITOR) tablet 80 mg  80 mg Oral QHS  pantoprazole (PROTONIX) 40 mg in 0.9% sodium chloride 10 mL injection  40 mg IntraVENous DAILY  insulin glargine (LANTUS) injection 4 Units  4 Units SubCUTAneous QHS  dextrose 10% infusion  10 mL/hr IntraVENous CONTINUOUS Telemetry: []Sinus []A-flutter []Paced [x]A-fib []Multiple PVCs Physical Exam:  
  
General: Intubated/sedated; HEENT:  Anicteric sclerae; +scleral edema; pink palpebral conjunctivae; mucosa moist 
Resp:  Symmetrical chest expansion, no accessory muscle use; good airway entry; no rales/ wheezing/ rhonchi noted CV:  S1, S2 present; irregularly irregular rhythm. +ectopy. Mult runs of PVC's and infrequent runs of v-tach. 2-3 beats GI:  Abdomen soft, non-tender; (+) active bowel sounds Extremities:  +2 pulses on all extremities; no cyanosis/ clubbing noted, 2+ pitting edema bilaterally. +edema labia majora. B/l wrist restraints Skin:  Multiple scratches noted to b/l thighs; erythema and warmth to B/l shins. +onychauxis b/l. Dry, flaky b/l feet. Mult scattered wounds and abrasions. Neurologic:  Non-focal 
Devices:  OGT,  ETT, dobson DATA: 
MAR reviewed and pertinent medications noted or modified as needed Labs: 
Recent Labs 10/22/20 
0835 10/21/20 
1817 10/21/20 
0440 WBC 9.0 8.4 9.0 HGB 13.4 14.1 13.3 HCT 39.8 42.8 40.0  246 247 Recent Labs 10/22/20 
0039 10/21/20 
1030 10/21/20 
0440 10/20/20 
1336 10/20/20 
4183 * 141 146* 148* 145  
K 3.4* 3.5 3.2* 3.3* 3.6  107 107 109 105 CO2 28 28 31 34* 30  
* 338* 169* 41* 267* BUN 54* 46* 53* 53* 59* CREA 1.81* 1.63* 1.76* 1.60* 1.94* CA 8.8 7.2* 8.5 8.8 9.0 MG 2.0  --  1.9 1.9  --   
PHOS 4.0  --  3.7 3.6  --   
ALB 2.3*  --  2.1*  --  2.8* *  --  149*  --  208* No results for input(s): PH, PCO2, PO2, HCO3, FIO2 in the last 72 hours. Recent Labs 10/22/20 
0450 10/21/20 
1005 10/21/20 
7540 FIO2I 28 28 35 HCO3I 30.1* 28.0* 28.1*  
PCO2I 35.2 39.2 32.1*  
PHI 7.54* 7.46* 7.55* PO2I 91 96 125* Imaging: 
[x]   I have personally reviewed the patients radiographs and reports XR Results (most recent): CXR Results  (Last 48 hours) 10/21/20 0515  XR CHEST PORT Final result Impression:  Impression: 1. Little overall change. Endotracheal tube in satisfactory position. Ongoing  
multifocal airspace opacities with small pleural effusions, similar to prior. Narrative:     
Examination: Portable AP chest   
   
History: Intubated Comparison: Recent comparison Findings: Endotracheal tube is 2.3 cm above the michele. NG tube terminates  
inferior to the field-of-view. Multifocal airspace opacities which are  
predominantly interstitial are again seen. There is a possible small bilateral  
pleural effusion. Overall, little interval change. No pneumothorax. Heart heart  
size is enlarged but stable. 10/20/20 0926  XR CHEST PORT Final result Impression:  IMPRESSION:  
   
Somewhat low-lying endotracheal tube which roughly 2 cm retraction is  
recommended. Suspected interstitial infiltrate/edema and retrocardiac opacity likely due to  
pleural effusion and underlying atelectasis or infiltrate. Mid to lower lung  
streaky densities, atelectasis versus infiltrate. These findings are similar to  
prior. Imaging follow-up recommended. Narrative:  EXAMINATION: Chest single view INDICATION: Sepsis COMPARISON: 10/6/2020 FINDINGS: Single frontal view of the chest obtained. ETT tip 1.3 cm above  
michele. Mildly enlarged cardiac silhouette. Prominent perihilar and beyond  
pulmonary interstitium. Bilateral mid to lower lung streaky opacities. Nonspecific retrocardiac opacity. No definite pneumothorax identified. No  
obvious acute osseous findings. CT Results (most recent): 
n/a IMPRESSION:  
· Acute hypoxic respiratory failure - intubated in the ED after hypoglycemic seizure for central cyanosis and desaturation. Likely 2/2 worsening CHF, echo with decreased EF 7-9%. · Anasarca - Pt grossly volume overloaded. · Congestive heart failure, systolic - EF< 60%%,  BNP >30,000 on admit, volume overloaded. · Possible NSTEMI  
· Seizure - suspect related to hypoglycemia, as low as 20 by EMS. Pt with no history of seizure disorder. · Hypoglycemia - Lowest value 20mg/dL on admission · Hypothermia - 94 degrees in ED, resolved · Covid negative. ·  Atrial fibrillation - hx of paroxysmal Afib on eliquis 5mg BID, held here and started on heparin gtt · Transaminitis - improving today, likely congestion related to worsening heart failure. · VALENTINA - Hx of CKD3, Baseline BUN/Cr 30/1.5; worsening on milrinone and intermittent lasix · Hypernatremia · ? Scabies- signs of itching/scratch marks to thighs, excoriations. Permethrin applied. · Hx T2DM Patient Active Problem List  
Diagnosis Code  Diabetes mellitus with kidney complication ([facility]) V28.88  
 Paroxysmal atrial fibrillation ([facility]) I48.0  Congestive heart failure ([facility]) I50.9  Mental retardation F79  Chronic kidney disease, stage III (moderate) N18.30  Dilated cardiomyopathy ([facility]) I42.0  Scoliosis M41.9  Hypertension I10  
 Hypothyroidism E03.9  Iron deficiency anemia, severe requiring transfusions 1/2015 D50.0  Advance directive declined by patient Z68.5  DDD (degenerative disc disease), lumbar M51.36  
 Paroxysmal atrial fibrillation with RVR ([facility]) I48.0  CVA (cerebral vascular accident) ([facility] [address] I63.9  VALENTINA (acute kidney injury) ([address] N17.9  Fall W19. Madie Mani  Conjunctivitis of both eyes H10.9  Hypothyroid E03.9  Cellulitis L03.90  Labia enlarged N90.60  Elevated troponin R77.8  Acute exacerbation of CHF (congestive heart failure) ([facility]) I50.9  Acute hypoxemic respiratory failure ([facility]) J96.01  
 CHF (congestive heart failure) ([facility]) I50.9  Respiratory failure with hypoxia ([facility] [address] J96.91  
 
  
RECOMMENDATIONS:  
 Neuro: Titrate sedation for RASS goal 0 to -1, daily sedation holiday. Restraints wrist b/l Pulm: Aspiration precautions, HOB>30'. VAP Bundle CVS:  Monitor HD, MAP goal >65, Give home carvedilol for afib with tachycardia,  Echo results with EF 7-9%, suspect NSTEMI/worsening CHF, troponin decreasing. Continuing intermittent diuresis (renal function dependent) and milrinone gtt for inotropic support. May require addition of Digoxin and/or amio gtt for rate control. +Ectopy with mult runs of PVC's on tele. Cont Hep gtt for afib/NSTEMI  
GI: NPO. Diet tube feeds per nutrition--> can initiate feeds today and deescalate D10. Renal: Trend Cr, UOP. Dobson draining paddy urine. Small amount of hematuria noted, monitor. Hem/Onc: Cont heparin drip per protocol. Trend H/H, monitor for s/o active bleeding. Daily CBC. I/D: Remains afebrile, without elevated WBC or infectious source here. Trend WBCs and temperature curve. Metabolic: Replete K. Continue Daily BMP, mag, phos. Trend lytes and replace per protocol. Adding H20 flushes for hypernatremia, Monitor while on intermittent lasix and milrinone, Endocrine:Q6 glucoses. Avoid hypoglycemia. D10 at 10 mL/hr--deescalate once feedings started. Musc/Skin: pressure ulcer prevention, See skin assessment above. Permethrin applied to skin, r/o scabies. Consult Palliative Care today- now DNR Full Code Discussed in interdisciplinary rounds Best practice : 
 
Glycemic control IHI ICU bundles: Dobson Bundle Followed and Vent Bundle Followed, Vent Day 3 [facility] Vent patients- VAP bundle, aim to keep peak plateau pressure 58-63UK H2O Sress ulcer prophylaxis. DVT prophylaxis. Need for Lines, dobson assessed. Restraints need. Palliative care evaluation. High complexity decision making was performed during this consultation and evaluation. [x]       Pt is at high risk for further organ failure and dysfunction. Dana Arreodndo, OSMANY-BC 
10/22/20 Pulmonary, Critical Care Medicine [facility]

## 2020-10-22 NOTE — ACP (ADVANCE CARE PLANNING)
Advanced Steps Advance Care Planning Conversation Date of conversation: 10/22/2020   Location: [facility] Length (minutes): 60 Pt does not have an AMD on file. Pt's mother is still living, resides in SNF due to dementia and not able to participant in medical decisions for pt. Pt has 2 living siblings. Her one sister suffers from servere hearing loss. Pt's sister, Magdalena Hopkins acting a spokesperson and healthcare decision maker per aSde. Participants: 
 [x] Patient unable intubated  
 [x] Healthcare agent (Next of Kin) Name: Magdalena Hopkins Relationship to Patient:sister Phone number: [phone] Advanced Steps® ACP Facilitator: Danny Vargas RN, Riri oRd NP Palliative consult for goals of care. Breanna David is a 71year old with a past history of CKD, CHF (EF now 15%), Afib on Eliquis, intellectual debility and DM. Pt was [facility] from [facility] with a diagnosis of profound hypoglycemia and  poorly responsive. Pt had seizure in transport and required intubation in the ED. Pt is currently intubated in the ICU level of care on heparin, Milrinone and Versed drips. Conversation Topics Patient response:intubated Healthcare Agent/Other Surrogate: \"My  spoke to the medical team last night, he has prepared me\". Call placed to [pt's sister, Magdalena Hopkins, provided medical update. Discussed goals of care in detail, Ms. Zora Sterling stated she understood her sister's poor  quality of life and health. She shared that since she had to go and live at the SNF her condition has declined. She agrees that in the event of cardiac arrest to no chest compressions or shock for Ms. Diaz-DNR. Pt is currently intubated. Alla Castro also agreed to not have the ET tube replaced if it is accidentally dislodged.  Also she stated that if she is removed from the Tube, but declines she does not want her sister, Carolyn Mercado Isa Martinez to be intubated again. Family is hoping she can be removed medically from the ventilator. She stated \"I want to tell my mother she is OK\". Goals of care DNR, do not replace ET if accidentally dislodged, going forward if liberated from ventilator no further intubation. ICU team aware of goals of care change. Cardiopulmonary Resuscitation Order Elected for CPR:  []  Attempt Resuscitation [x]  Do Not Attempt Resuscitation When NOT in Cardiopulmonary Arrest, Order Elected:  
[] Comfort Measures 
[x] Limited Additional Interventions [] Full Interventions Artificially Administered Nutrition, Order Elected: 
 Not addressed at this time. [] No Feeding Tube  
[] Feeding Tube for a defined trial period 
[] Feeding Tube long-term if indicated Meeting Outcomes: 
 [] ACP discussion completed 
 [] Dru form completed 
[] [place] prepared for Provider review and signature 
 [] Original placed on Chart, if in facility (form to be sent with patient at discharge) [] Copy given to healthcare agent  
 [] Copy scanned to electronic medical record If ACP discussion not completed, last interview topic discussed:  Palliative will follow to determine if pt is able to be liberated from the ventilator. Will continue discussion with family as to next steps as pt's conditions changes. Plan to try to complete POST form prior to discharge. Follow-up plan:   
 [x] Schedule follow-up conversation to continue planning 
 [] Referred individual to Provider for additional questions/concerns  
[] Advised patient/agent/surrogate to review completed POST form and update if needed with changes in condition, patient preferences or care setting  
 
[] This note routed to one or more involved healthcare providers Nikolai ALVAREZN, RN, [facility] Palliative Medicine Inpatient RN [facility] Palliative [place] Line: [phone])

## 2020-10-22 NOTE — DIABETES MGMT
GLYCEMIC CONTROL PLAN OF CARE Assessment/Recommendations: 
Noted pt with hypoglycemia 10/20/2020. Lantus discontinued. IVF D10 to be discontinued and Tube feedings to be started. Will continue inpatient monitoring. Most recent blood glucose values: 
 
 
 
Results for Carmen Hartley (MRN [id number]) as of 10/22/2020 10:52 Ref. Range 10/21/2020 07:56 10/21/2020 13:16 10/21/2020 18:11 10/21/2020 23:59 10/22/2020 06:25 GLUCOSE,FAST - POC Latest Ref Range: 70 - 110 mg/dL 118 (H) 121 (H) 92 108 132 (H) Current A1C of 7.8 % is equivalent to average blood glucose of 177 mg/dl over the past 2-3 months. Current hospital diabetes medications:  
None currently. lantus 4 units daily initially ordered, now discontinued Previous day's insulin requirements:  
lantus 4 units given Home diabetes medications: 
Lantus 4 units every bedtime for BG >140 mg/dl Humalog corrective insulin as directed. Diet:   
NPO. TF to start Education:  ____Refer to Diabetes Education Record __x_Education not indicated at this time 
sedated Valentino Holder RN CDE Ext [phone]

## 2020-10-22 NOTE — CONSULTS
Palliative Medicine Consult [facility]: [phone]) [facility]: [phone] [facility]: [phone] Patient Name: Rachel Berg YOB: 1955 Date of Initial Consult: 10/22/2020 Reason for Consult: care decisions Requesting Provider: Ms. Davin [place] 
Primary Care Physician: Darren Garcia MD 
  
 SUMMARY:  
Rachel Berg is a 71y.o. year old with a past history of CKD, CHF (EF 20-25%), Afib on Eliquis, intellectual debility and DM (on insulin) who was admitted on 10/20/2020 from [address] with a diagnosis of profound hypoglycemia undetectable per EMS, poorly responsive. During transport to hospital she had a tonic clonic seizure. Cyanotic on arrival to ED. Zack Sepulveda was intubated in the ER. Transferred to ICU. Noted EF less then 15%. Current medical issues leading to Palliative Medicine involvement include: 71year old who has intellectual debility, lives in a nursing home, now orally intubated, EF of 15% on Milrinone drip. Palliative medicine is consulted for goals of care. PALLIATIVE DIAGNOSES:  
1. Goals of care 2. Acute resp failure 3. Congestive heart failure 4. Debility PLAN:  
1. Goals of care patient seen at bedside along with Ms Sai Flor RN. Patient is orally intubated. She is not able to participate in her medical decisions. There is no AMD on file. We spoke with her sister Analia Anne. Mayra Santoyo's mother is still living but in a nursing facility with dementia and is not able to participate in medical decisions. They have another sister who is deaf and not able to make medical decisions. Legal next of kin and surrogate medical decision maker is her sister Analia Anne. Reviewed overall medical picture with Analia Anne. Goals of care discussed in some detail with Analia Anne, who understands overall Zack Sepulveda has a poor quality of life and health. In the event of cardiac arrest no chest compressions or shock. DNR, patient is currently intubated, if the tube were to be accidentally dislodged do not replace. All are hopeful Ariadne Holt will be able to be liberated from the ventilator and if able going forward Christian would not want her to be re intubated for any reason. Goals of care DNR, do not replace ET if accidentally dislodged, going forward if liberated from ventilator no further intubation. ICU team aware of goals of care change. 2. Acute resp failure PCCM on board, orally intubated 3. Congestive heart failure cards on board, EF less then 15%, on Milrinone drip. Not a candidate for any surgical intervention. Sister Beti Owens is aware of this. We have briefly discussed a more comfort measures approach. Will continue same for now. 4. Debility lives in nursing home, wheel chair bound, prior to March ( last time sister saw patient ) she was able to feed herself. Shared with sister it is likely her functional debilities are now worse if she is able to recover from this event. 5. Initial consult note routed to primary continuity provider 6. Communicated plan of care with: Palliative IDT,  Christian, PCCM team  
 
 
Patient/Health Care Proxy Stated Goals: Prolong life TREATMENT PREFERENCES:  
Code Status: DNR Advance Care Planning: 
[] [facility] Interdisciplinary Team has updated the ACP Navigator with [address] and Patient Capacity Primary Decision [facility] Agent):   Primary Decision Maker: Jorge Maurer -  - [phone] Other: As far as possible, the palliative care team has discussed with patient / health care proxy about goals of care / treatment preferences for patient. HISTORY:  
 
History obtained from: chart and family CHIEF COMPLAINT: profound hypoglycemia HPI/SUBJECTIVE: The patient is:  
[] Verbal and participatory [x] Non-participatory due to:  Orally intubated poorly responsive Please see summary Clinical Pain Assessment (nonverbal scale for nonverbal patients): Clinical Pain Assessment Severity: 0 Activity (Movement): Lying quietly, normal position Duration: for how long has pt been experiencing pain (e.g., 2 days, 1 month, years) Frequency: how often pain is an issue (e.g., several times per day, once every few days, constant) FUNCTIONAL ASSESSMENT:  
 
Palliative Performance Scale (PPS): PPS: 30 
 
ECOG 
ECOG Status : Completely disabled PSYCHOSOCIAL/SPIRITUAL SCREENING:  
  
Any spiritual / Anglican concerns: unable to assess for patient  
[] Yes /  [x] No 
 
Caregiver Burnout: 
[] Yes /  [] No /  [x] No Caregiver Present Anticipatory grief assessment: unable to assess for patient  
[] Normal  / [] Maladaptive REVIEW OF SYSTEMS:  
 
Positive and pertinent negative findings in ROS are noted above in HPI. The following systems were [] reviewed / [x] unable to be reviewed as noted in HPI Other findings are noted below. Systems: constitutional, ears/nose/mouth/throat, respiratory, gastrointestinal, genitourinary, musculoskeletal, integumentary, neurologic, psychiatric, endocrine. Positive findings noted below. Modified ESAS Completed by: provider Pain: 0 Dyspnea: 0 Stool Occurrence(s): 0 PHYSICAL EXAM:  
 
Wt Readings from Last 3 Encounters:  
10/21/20 68 kg (150 lb) 08/29/20 64.2 kg (141 lb 9.6 oz)  
07/20/20 59.3 kg (130 lb 11.7 oz) Blood pressure (!) 148/89, pulse (!) 126, temperature 97.3 °F (36.3 °C), resp. rate 21, height 5' 5\" (1.651 m), weight 68 kg (150 lb), SpO2 100 %. Pain: 
Pain Scale 1: Adult Nonverbal Pain Scale Pain Intensity 1: 0 Last bowel movement: none recorded Constitutional: orally intubated appeared comfortable sedated ENMT: orally intubated Respiratory: ventilator supported Skin: warm, dry Neurologic: sedated did not respond to her name or light touch  HISTORY:  
 
 Active Problems: 
  Respiratory failure with hypoxia ([address] (10/20/2020) Past Medical History:  
Diagnosis Date  Cardiac echocardiogram 02/09/2015 EF 64%. No WMA. Mild LVH. Gr 2 DDfx. RVSP 30 mmHg. Mod-severe LAE. No significant valvular heart disease.  Cardiac nuclear imaging test, abnormal 02/09/2015 LV apical defect likely c/w prior infarction w/sm area of distal anterior romulo-infarct ischemia. EF 57%. Apical WMA, likely from prior infarct. Nondiagnostic EKG on pharm stress test.  
 Chronic kidney disease, stage III (moderate) ([address] 2/6/2012  Chronic renal disease, stage III ([address]  Congestive heart failure ([address] 2/6/2012  Congestive heart failure, unspecified 01/2012 Echocardiogram with severe global hypokinesis and EF 30% consistent with dilated cardiomyopathy.  DDD (degenerative disc disease), lumbar 9/10/2016  Diabetes mellitus ([address] 01/2015 Presented with blood sugar of 637 and HbA1c 13.4  Diabetes mellitus with kidney complication ([address] 9/8/3180  Dilated cardiomyopathy ([address] 2/6/2012  Hypertension  Hypothyroidism  Iron deficiency anemia 01/2015 Presented with Hb 5.1/ Hct 18.0. On Xarelto. No obvious source of bleeding. Did not follow-up for outpatient GI evaluation.  Low back pain 01/21/2015 Abdominal CT scan with thoracolumbar rotoscoliosis with severe multilevel degenerative disc disease and facet arthropathy; central canal stenosis at L4-L5.  Mental retardation 2/6/2012  Paroxysmal atrial fibrillation ([address] 01/2012 Presented with atrial fibrillation with RVR; converted spontaneously to sinus rhythm during hospitalization and subsequently started on amiodarone and Xarelto. No past surgical history on file. No family history on file. History reviewed, no pertinent family history. Social History Tobacco Use  Smoking status: Never Smoker  Smokeless tobacco: Never Used Substance Use Topics  Alcohol use: No  
 
No Known Allergies Current Facility-Administered Medications Medication Dose Route Frequency  influenza vaccine 2020-21 (6 mos+)(PF) (FLUARIX/FLULAVAL/FLUZONE QUAD) injection 0.5 mL  0.5 mL IntraMUSCular PRIOR TO DISCHARGE  digoxin (LANOXIN) injection 250 mcg  250 mcg IntraVENous NOW  midazolam in normal saline (VERSED) 1 mg/mL infusion  0-10 mg/hr IntraVENous TITRATE  fentaNYL (PF) 900 mcg/30 ml infusion soln  0-200 mcg/hr IntraVENous TITRATE  midazolam (VERSED) injection 1-2 mg  1-2 mg IntraVENous Q30MIN PRN  
 fentaNYL citrate (PF) injection  mcg   mcg IntraVENous Q30MIN PRN  
 milrinone (PRIMACOR) 20 MG/100 ML D5W infusion  0.375 mcg/kg/min IntraVENous CONTINUOUS  
 carvediloL (COREG) tablet 3.125 mg  3.125 mg Oral BID  heparin (porcine) 25,000 units in 0.45% saline 250 ml infusion  18-36 Units/kg/hr IntraVENous TITRATE  sodium chloride (NS) flush 5-10 mL  5-10 mL IntraVENous PRN  
 sodium chloride (NS) flush 5-40 mL  5-40 mL IntraVENous Q8H  
 sodium chloride (NS) flush 5-40 mL  5-40 mL IntraVENous PRN  
 acetaminophen (TYLENOL) tablet 650 mg  650 mg Oral Q6H PRN Or  
 acetaminophen (TYLENOL) suppository 650 mg  650 mg Rectal Q6H PRN  polyethylene glycol (MIRALAX) packet 17 g  17 g Oral DAILY PRN  promethazine (PHENERGAN) tablet 12.5 mg  12.5 mg Oral Q6H PRN Or  
 ondansetron (ZOFRAN) injection 4 mg  4 mg IntraVENous Q6H PRN  chlorhexidine (PERIDEX) 0.12 % mouthwash 10 mL  10 mL Oral Q12H  levothyroxine (SYNTHROID) tablet 100 mcg  100 mcg Oral 6am  
 atorvastatin (LIPITOR) tablet 80 mg  80 mg Oral QHS  pantoprazole (PROTONIX) 40 mg in 0.9% sodium chloride 10 mL injection  40 mg IntraVENous DAILY  dextrose 10% infusion  10 mL/hr IntraVENous CONTINUOUS  
 
 
 LAB AND IMAGING FINDINGS:  
 
Lab Results Component Value Date/Time  WBC 9.0 10/22/2020 12:39 AM  
 HGB 13.4 10/22/2020 12:39 AM  
 PLATELET 058 43/55/3414 12:39 AM  
 
Lab Results Component Value Date/Time Sodium 147 (H) 10/22/2020 12:39 AM  
 Potassium 3.4 (L) 10/22/2020 12:39 AM  
 Chloride 110 10/22/2020 12:39 AM  
 CO2 28 10/22/2020 12:39 AM  
 BUN 54 (H) 10/22/2020 12:39 AM  
 Creatinine 1.81 (H) 10/22/2020 12:39 AM  
 Calcium 8.8 10/22/2020 12:39 AM  
 Magnesium 2.0 10/22/2020 12:39 AM  
 Phosphorus 4.0 10/22/2020 12:39 AM  
  
Lab Results Component Value Date/Time Alk. phosphatase 148 (H) 10/22/2020 12:39 AM  
 Protein, total 5.3 (L) 10/22/2020 12:39 AM  
 Albumin 2.3 (L) 10/22/2020 12:39 AM  
 Globulin 3.0 10/22/2020 12:39 AM  
  (H) 10/20/2020 01:36 PM  
 
Lab Results Component Value Date/Time INR 1.2 08/26/2020 03:30 AM  
 Prothrombin time 15.4 (H) 08/26/2020 03:30 AM  
 aPTT 82.0 (H) 10/22/2020 10:35 AM  
  
Lab Results Component Value Date/Time Iron 28 (L) 07/08/2020 04:34 PM  
 TIBC 223 (L) 07/08/2020 04:34 PM  
 Iron % saturation 13 (L) 07/08/2020 04:34 PM  
  
No results found for: PH, PCO2, PO2 No components found for: [place] Lab Results Component Value Date/Time CK 29 10/22/2020 12:39 AM  
 CK - MB 1.5 10/22/2020 12:39 AM  
  
 
   
 
Total time: 50 minutes Counseling / coordination time, spent as noted above: 40 minutes  
> 50% counseling / coordination: yes with sister Blake Maki Prolonged service was provided for  []30 min   []75 min in face to face time in the presence of the patient, spent as noted above. Time Start:  
Time End:  
Note: this can only be billed with 84661 (initial) or 03590 (follow up). If multiple start / stop times, list each separately.

## 2020-10-22 NOTE — PALLIATIVE CARE
Full note to follow Patient seen at bedside she is orally intubated and sedated Spoke with sister Monty Brooks updated on condition. Patient lives in nursing home with intellectual disablility. Goals of care discussed with Monty Brooks DNR / no chest compressions, no shock, no reintubation if accidentally dislodged, if is liberated from ventilator sister would not wish for her to be reintubated for any reason. Code status placed as DNR  
 
ICU team aware of goals of care change.

## 2020-10-22 NOTE — PROGRESS NOTES
[facility] - Progress Note Admit Date: 10/20/2020 Assessment:  
 
Hospital Problems  Date Reviewed: 9/17/2020 Codes Class Noted POA Respiratory failure with hypoxia [facility]) ICD-10-CM: J96.91 
ICD-9-CM: 518.81  10/20/2020 Unknown  
   
  
 
 
-Presented with severe hypoglycemia and seizure from nursing home. 
-Acute respiratory failure, intubated in ED for airway protection. Suspect due to acute on chronic systolic heart failure. Possible aspiration component as well. -Severe biventricular heart failure. Echo this admission with EF <15% with globally reduced systolic function, dilated RV with severely reduced systolic function and dilated LA/RA. 
-H/o dilated CMY presumed due to hypertensive heart disease conservatively managed in setting of comorbidities. EF now <15% with four chamber dilatation (EF 25% 7/2020 with four-chamber dilatation conservatively managed in setting of comorbidities. EF 30% 2012 improved to 64% 2015. Nuclear stress 2015 without significant ischemia). -Indeterminate troponin. ECG with chronic nonspecific ST wave abnormalities. Do not suspect primary ischemic event. Not currently candidate for ischemic work up. 
-Possible aspiration pneumonia. 
-Chronic HFrEF. -Acute on chronic stage 3 kidney disease. 
-Paroxysmal atrial fibrillation which now appears persistent. On coreg and Eliquis as outpatient. Started on heparin for anticoagulation this admission. 
-Hypertension. Low normal this admission. 
-Diabetes mellitus. hgbA1c 7.8. 
-Hypothyroidism. -H/o TIAs/CVA. -H/o GIB. -Intellectual disability. 
-Wheelchair bound. 
  
Remotely followed by Dr Brittany Kwong 2015. Recently followed by [facility] on last admissions, followed up with Robin Mulligan NP post hospital. Plan to follow up with Dr. Robert Dennis. 
  
 
Plan:  
 
BP labile for which scheduled IV lasix was not started.  Appears to be diuresing while on milrinone infusion, diuresed 800 liters this AM, will continue for today. PVCs noted with short runs NSVT, would continue electrolyte replacement as needed, if increased ventricular ectopy will need to decrease milrinone rate. HR elevated 130s at present, will give additional digoxin IV now, will continue BB as Bp allows. No ace or arb given renal function. Patient is continued on heparin infusion. Appreciate palliative care evaluation. Patient is now DNR. Given significant co morbidities with biventricular heart failure, patient not likely candidate for further interventions, recommend ongoing discussions regarding goals of care. Patient is now on isolation for possible scabies. Seen and independently examined. Agree with below with the following comments: Patient appears to be diuresing reasonably well. Minimal vent settings today. Agree with IV digoxin to help with rate control and rapid atrial fibrillation. Will start scheduled digoxin daily. Would continue milrinone infusion for least another 24 hours and then reevaluate. She is not a candidate for advanced heart failure measures. Appreciate palliative care consultation. Subjective:  
 
Remains intubated. Objective:  
  
Patient Vitals for the past 8 hrs: 
 Temp Pulse Resp BP SpO2  
10/22/20 1200  (!) 127 21 (!) 146/77 98 % 10/22/20 1100  (!) 118 21 (!) 143/75 100 % 10/22/20 1000  (!) 114  (!) 143/95 100 % 10/22/20 0902  (!) 126 11  100 % 10/22/20 0900  (!) 106  132/69 100 % 10/22/20 0800  98 11 127/80   
10/22/20 0700 97.3 °F (36.3 °C) (!) 111 11 (!) 117/58 100 % 10/22/20 0600 97.5 °F (36.4 °C) (!) 110 10 (!) 101/58 100 % 10/22/20 0500 97.9 °F (36.6 °C) (!) 122 10 (!) 82/40 98 % Patient Vitals for the past 96 hrs: 
 Weight 10/21/20 0949 68 kg (150 lb) 10/20/20 0951 68 kg (150 lb) Intake/Output Summary (Last 24 hours) at 10/22/2020 1238 Last data filed at 10/22/2020 0630 Gross per 24 hour Intake 639.96 ml Output 1000 ml Net -360.04 ml Physical Exam: 
General:  no distress Lungs:  clear to auscultation bilaterally Heart:  irregularly irregular rhythm Abdomen:  abdomen is soft Extremities:  extremities normal, atraumatic, no cyanosis or edema Data Review:  
 
Labs: Results:  
   
Chemistry Recent Labs 10/22/20 
0039 10/21/20 
1030 10/21/20 
0440 10/20/20 
1336 10/20/20 
1703 * 338* 169* 41* 267* * 141 146* 148* 145  
K 3.4* 3.5 3.2* 3.3* 3.6  107 107 109 105 CO2 28 28 31 34* 30  
BUN 54* 46* 53* 53* 59* CREA 1.81* 1.63* 1.76* 1.60* 1.94* CA 8.8 7.2* 8.5 8.8 9.0 MG 2.0  --  1.9 1.9  --   
PHOS 4.0  --  3.7 3.6  --   
AGAP 9 6 8 5 10 BUCR 30* 28* 30* 33* 30* *  --  172*  --  223* TP 5.3*  --  4.8*  --  6.8 ALB 2.3*  --  2.1*  --  2.8*  
GLOB 3.0  --  2.7  --  4.0 AGRAT 0.8  --  0.8  --  0.7* CBC w/Diff Recent Labs 10/22/20 
9592 10/21/20 
1817 10/21/20 
0440 WBC 9.0 8.4 9.0  
RBC 4.00* 4.24 4.01* HGB 13.4 14.1 13.3 HCT 39.8 42.8 40.0  246 247 GRANS 64 67 70 LYMPH 22 20* 17* EOS 2 1 1 Cardiac Enzymes Lab Results Component Value Date/Time CPK 29 10/22/2020 12:39 AM  
 CKMB 1.5 10/22/2020 12:39 AM  
 CKND1 5.2 (H) 10/22/2020 12:39 AM  
 TROIQ 0.53 (H) 10/22/2020 12:39 AM  
  
Coagulation Recent Labs 10/22/20 
0440 10/22/20 
1153 APTT >180.0* >180.0* Lipid Panel Lab Results Component Value Date/Time Cholesterol, total 145 07/08/2020 04:20 AM  
 HDL Cholesterol 56 07/08/2020 04:20 AM  
 LDL, calculated 69 07/08/2020 04:20 AM  
 VLDL, calculated 20 07/08/2020 04:20 AM  
 Triglyceride 100 07/08/2020 04:20 AM  
 CHOL/HDL Ratio 2.6 07/08/2020 04:20 AM  
  
BNP No results found for: BNP, BNPP, XBNPT Liver Enzymes Recent Labs 10/22/20 
1951 TP 5.3* ALB 2.3* * Digoxin Thyroid Studies Lab Results Component Value Date/Time  TSH 0.90 10/20/2020 01:36 PM  
    
 
Signed By: RONALDO Nicholas   
 October 22, 2020 Ivy Baker MD

## 2020-10-22 NOTE — PROGRESS NOTES
Comprehensive Nutrition Assessment Type and Reason for Visit: Initial 
 
Nutrition Recommendations/Plan: 
- Start tube feeding of Vital High Protein at 20 mL/hr, advancing as tolerated by 10 mL q 4 hours to goal rate of 65 mL/hr x 22 hours (hold 1 hour before and 1 hour after levothyroxine administration) with 150 mL q 4 hour water flushes. 
- IVF to be discontinued once EN starts per MD. 
 
Nutrition Assessment:  Intubated in the ED after hypoglycemic seizure for central cyanosis and desaturation. OGT clamped with plan to start TF today. D10 infusion to stop once TF started. Malnutrition Assessment: 
Malnutrition Status: At risk for malnutrition (specify)(NPO, intubated) Nutrition History and Allergies: PMHx of CKD, CHF (EF 20-25%), mental retardation and DM (on insulin) who presented via EMS for unconsciousness and undetectable blood glucose. Wt gain x 3 months per chart. NKFA Estimated Daily Nutrient Needs: 
Energy (kcal):  7815-0316 Protein (g):   Fluid (ml/day):  6030-0761 Nutrition Related Findings:  BM PTA. Na 147 today with plan to start free water flushes. Po levothyroxine. K replaced. Wounds:   
Stage I, Pressure injury Current Nutrition Therapies: DIET NPO with Tube Feeding, Except Meds Recommended Tube Feeding (TF) Orders: · Feeding Route: Nasogastric · Formula: Vital HighProtein · Schedule:Continuous · Regimen: starting at 20 mL/hr (goal of 65 mL/hr x 22 hours) · Water Flushes: 150 mL q 4 hours (900 mL) · Recommended Goal TF & Flush Orders Provides: x 22 hours- 1430 kcal, 125 gm protein, 1195 mL free water, 100% RDIs Anthropometric Measures: 
· Height:  5' 5\" (165.1 cm) · Current Body Wt:  68 kg (150 lb) · Admission Body Wt:  150 lb · Usual Body Wt:  130 lb(per chart) · Ideal Body Wt:  125 lbs:  120 % · BMI Category:  Normal weight (BMI 18.5-24. 9) Nutrition Diagnosis: · Inadequate oral intake related to impaired respiratory function as evidenced by NPO or clear liquid status due to medical condition, intubation Nutrition Interventions:  
Food and/or Nutrient Delivery: Continue NPO, Start tube feeding, IV fluid delivery Nutrition Education and Counseling: Education not indicated Coordination of Nutrition Care: Continued inpatient monitoring, Interdisciplinary rounds(verbal orders from Dr. Italia Josue) Goals: 
Nutritional needs will be met through adequate oral intake or nutrition support within the next 7 days. Nutrition Monitoring and Evaluation:  
Food/Nutrient Intake Outcomes: Enteral nutrition intake/tolerance, IVF intake Physical Signs/Symptoms Outcomes: Biochemical data, GI status, Hemodynamic status, Nutrition focused physical findings Discharge Planning: Too soon to determine Electronically signed by Dwain Kent RD on 10/22/2020 at 11:14 AM 
 
Contact: [phone]

## 2020-10-22 NOTE — PROGRESS NOTES
attended the interdisciplinary rounds for Ermelinda Hinson, who is a 71 y.o.,female. Patients Primary Language is: [place]. According to the patients EMR Moravian Affiliation is: [facility].  
 
The reason the Patient came to the hospital is:  
Patient Active Problem List  
 Diagnosis Date Noted  Respiratory failure with hypoxia ([address] 10/20/2020  Acute exacerbation of CHF (congestive heart failure) ([address] 08/25/2020  Acute hypoxemic respiratory failure ([address] 08/25/2020  CHF (congestive heart failure) ([address] 08/25/2020  Paroxysmal atrial fibrillation with RVR ([address] 07/08/2020  CVA (cerebral vascular accident) ([address] 07/08/2020  VALENTINA (acute kidney injury) ([address] 07/08/2020  Fall 07/08/2020  Conjunctivitis of both eyes 07/08/2020  Hypothyroid 07/08/2020  Cellulitis 07/08/2020  Labia enlarged 07/08/2020  Elevated troponin 07/08/2020  DDD (degenerative disc disease), lumbar 09/10/2016  Advance directive declined by patient 11/03/2015  Iron deficiency anemia, severe requiring transfusions 1/2015 01/21/2015  Hypothyroidism 11/19/2013  Hypertension 02/28/2012  Diabetes mellitus with kidney complication ([address] 67/33/2280  Paroxysmal atrial fibrillation ([address] 02/06/2012  Congestive heart failure ([address] 02/06/2012  Mental retardation 02/06/2012  Chronic kidney disease, stage III (moderate) 02/06/2012  Dilated cardiomyopathy ([address] 02/06/2012  Scoliosis 02/06/2012 Plan: 
Chaplains will continue to follow and will provide pastoral care on an as needed/requested basis.  recommends bedside caregivers page  on duty if patient shows signs of acute spiritual or emotional distress. [address] [facility] Board Certified [facility]  
[phone]

## 2020-10-23 NOTE — PROGRESS NOTES
Death Pronouncement Note Patient lying in bed, mouth open, eyes closed. Pupils fixed and equal bilaterally. No response to verbal or painful stimuli. No heart or lung sounds auscultated. No carotid or peripheral pulses. Death officially pronounced at 23:00, 10/22/2020 Medical Examiner notified: No, does not meet criteria Family Notified: Corlis Chain Milly Wise PA-C 
10/22/20 Pulmonary Critical Care Medicine [address] [facility]

## 2020-10-23 NOTE — DISCHARGE SUMMARY
Death Summary Patient: Sean Morales               Sex: female          DOA: 10/20/2020 YOB: 1955      Age:  71 y.o.        LOS:  LOS: 2 days Admit Date: 10/20/2020 Discharge Date: 10/22/2020 Admission Diagnoses: Respiratory failure with hypoxia ([facility] [J96.91] Discharge Diagnoses:   
Problem List as of 10/22/2020 Date Reviewed: 9/17/2020 Codes Class Noted - Resolved Respiratory failure with hypoxia [facility]) ICD-10-CM: J96.91 
ICD-9-CM: 518.81  10/20/2020 - Present Acute exacerbation of CHF (congestive heart failure) ([facility]) ICD-10-CM: I50.9 ICD-9-CM: 428.0  8/25/2020 - Present Acute hypoxemic respiratory failure ([place] [address].) ICD-10-CM: J96.01 
ICD-9-CM: 518.81  8/25/2020 - Present CHF (congestive heart failure) ([facility]) ICD-10-CM: I50.9 ICD-9-CM: 428.0  8/25/2020 - Present Paroxysmal atrial fibrillation with RVR ([facility]) ICD-10-CM: I48.0 ICD-9-CM: 427.31  7/8/2020 - Present CVA (cerebral vascular accident) [facility]) ICD-10-CM: I63.9 ICD-9-CM: 434.91  7/8/2020 - Present VALENTINA (acute kidney injury) ([place] [address].) ICD-10-CM: N17.9 ICD-9-CM: 584.9  7/8/2020 - Present Fall ICD-10-CM: W19. Demetria Agent ICD-9-CM: E888.9  7/8/2020 - Present Conjunctivitis of both eyes ICD-10-CM: H10.9 ICD-9-CM: 372.30  7/8/2020 - Present Hypothyroid ICD-10-CM: E03.9 ICD-9-CM: 244.9  7/8/2020 - Present Cellulitis ICD-10-CM: L03.90 ICD-9-CM: 682.9  7/8/2020 - Present Labia enlarged ICD-10-CM: N90.60 ICD-9-CM: 624.3  7/8/2020 - Present Elevated troponin ICD-10-CM: R77.8 ICD-9-CM: 790.6  7/8/2020 - Present DDD (degenerative disc disease), lumbar ICD-10-CM: M51.36 
ICD-9-CM: 722.52  9/10/2016 - Present Advance directive declined by patient ICD-10-CM: Z78.9 ICD-9-CM: V49.89  11/3/2015 - Present Iron deficiency anemia, severe requiring transfusions 1/2015 ICD-10-CM: D50.0 ICD-9-CM: 280.0  1/21/2015 - Present Hypothyroidism ICD-10-CM: E03.9 ICD-9-CM: 244.9  2013 - Present Hypertension ICD-10-CM: I10 
ICD-9-CM: 401.9  2012 - Present Diabetes mellitus with kidney complication (HCC) XZK-12-CI: E11.29 ICD-9-CM: 250.40  2012 - Present Paroxysmal atrial fibrillation (HCC) ICD-10-CM: I48.0 ICD-9-CM: 427.31  2012 - Present Congestive heart failure ([facility] ICD-10-CM: I50.9 ICD-9-CM: 428.0  2012 - Present Mental retardation ICD-10-CM: F79 
ICD-9-CM: 798  2012 - Present Chronic kidney disease, stage III (moderate) ICD-10-CM: N18.30 ICD-9-CM: 585.3  2012 - Present Dilated cardiomyopathy ([facility] ICD-10-CM: I42.0 ICD-9-CM: 425.4  2012 - Present Scoliosis ICD-10-CM: M41.9 ICD-9-CM: 737.30  2012 - Present RESOLVED: Elevated TSH ICD-10-CM: R79.89 ICD-9-CM: 794.5  2012 - 2013 Discharge Condition:   Hospital Course:  
 
 
72 YO F with PMHx of CKD, CHF (EF 20-25%), Afib on Eliquis and DM (on insulin) who presented to [facility] ED via EMS on 10/19/20  for unconsciousness and undetectable blood glucose. BS was reportedly in the 20s and 30s per EMS PTA. It is unclear whether she had recently been given insulin while at the nursing facility. During transit to ED, she started having a generalized tonic clonic seizure, as noted by EMS. Upon arrival to ED, pt was post ictal and was given D10 infusion which resolved her hyperglycemia. She was extremely cyanotic per the ED staff despite having 100% SpO2 saturations. Her mental status remained poor and fluctuated between unconscious and very agitated and ultimately she was intubated for airway protection. Subsequent CXR showed possible aspiration, for which she was give broad spectrum ABX. CT head (-) for acute process. CT abd/pelvis with ascites and anasarca but no acute pathology. Labs indicated a mild VALENTINA and elevated BNP, along with mild trop elevation. Patient was admitted to ICU for further management. Unclear cause of the low glucose, though may have been iatrogenic. Pt did however, appear to be in florid CHF exacerbation, for which she was give Lasix IV. Cardiology was consulted and Echo was obtained, which unfortunately showed EF of<15% and moderate to severe MR. In addition, patient showed evidence of severe biventricular HF, with a dilated left atrium, dilated right ventricle with severely reduced systolic function and dilated right atrium. Course further complicated with persistent A. fib with RVR with HR in the 120s to 130s despite dishing of Coreg for rate control. She was also started on milrinone for inotropic support and eventually was given a dose of digoxin for persistent A. fib with RVR. Despite these measures, patient persisted in A. fib with RVR and had soft BP. She was diuresed as BP allowed. Regarding her mental status, patient remained on Versed drip. On 10/22, patient was made a DNR per family given her overall poor prognosis and options for her stage of CHF being very limited. She remained tachycardic throughout the day and on minimal vent support. After further discussion with family regarding patient's goals of care and long-term prognosis, family decided to transition patient to comfort care with compassionate extubation. Patient was compassionately extubated on the evening of 10/22 and  shortly thereafter. Significant Diagnostic Studies:  
Echo [10/21/20]: 
· LV: Estimated LVEF is <15%. Visually measured ejection fraction. Normal cavity size. Severely and globally reduced systolic function. · MV: Mitral valve non-specific thickening. Moderate to severe mitral valve regurgitation is present. · TV: Mild to moderate tricuspid valve regurgitation is present. · PV: Mild pulmonic valve regurgitation is present. · Pericardium: Trivial pericardial effusion. · LA: Dilated left atrium. · RV: Dilated right ventricle. Severely reduced systolic function. · RA: Dilated right atrium CXR [10/22/20]: 
HISTORY: Intubation 
  
ET tube tip 3 cm from michele. NG tube extends into the stomach. Stable 
cardiomegaly. Interstitial edema, bilateral basilar infiltrate/atelectasis with 
pleural effusion, unchanged 
  
IMPRESSION: Stable CXR 
 
CXR [10/21/20]: History: Intubated 
  
Comparison: Recent comparison 
  
Findings: Endotracheal tube is 2.3 cm above the michele. NG tube terminates inferior to the field-of-view. Multifocal airspace opacities which are predominantly interstitial are again seen. There is a possible small bilateral pleural effusion. Overall, little interval change. No pneumothorax. Heart heart size is enlarged but stable. 
  
IMPRESSION: 
1. Little overall change. Endotracheal tube in satisfactory position. Ongoing multifocal airspace opacities with small pleural effusions, similar to prior CXR [10/20/20]: FINDINGS: Single frontal view of the chest obtained. ETT tip 1.3 cm above 
michele. Mildly enlarged cardiac silhouette. Prominent perihilar and beyond 
pulmonary interstitium. Bilateral mid to lower lung streaky opacities. Nonspecific retrocardiac opacity. No definite pneumothorax identified. No 
obvious acute osseous findings. 
  
IMPRESSION: 
  
Somewhat low-lying endotracheal tube which roughly 2 cm retraction is 
recommended. 
  
Suspected interstitial infiltrate/edema and retrocardiac opacity likely due to 
pleural effusion and underlying atelectasis or infiltrate. Mid to lower lung 
streaky densities, atelectasis versus infiltrate. These findings are similar to 
prior. Imaging follow-up recommended. CT ABD PELV WO CONT [10/20/20]: FINDINGS: The study is suboptimal due to lack of IV contrast.  Subtle 
abnormality can be under detected. 
  
Lung Bases: Shows bilateral pleural effusions, moderate to large on the right and small on the left with atelectasis in anterior and posterior lung bases. The 
heart is moderately enlarged. Significant chest wall edema. Extensive coronary 
artery calcifications. 
  
Marked anasarca in abdominal and pelvic wall as well as motion artifact makes 
the detail evaluation very difficult. Moderate ascites and diffuse mesenteric 
stranding also contribute. 
  
The gallbladder is moderately distended with small layering tiny gallstones. Moderate common bile duct dilatation is poorly seen. There is questionable 
elongated hyperdensity in the distal common bile duct and measures 8 x 8 x 23 
mm, axial #38 and coronal #23. 
  
The liver, spleen, pancreas, bilateral kidneys and right adrenal appear grossly 
unremarkable. The left adrenal is poorly seen. NG tube seen in the stomach. The 
small and large bowel are nondilated. Scattered diverticula in the distal colon. There is a mild stool burden in the colon seen as well. No bowel dilatation, 
obstruction seen. 
  
The bladder is suboptimally distended with a Marlow catheter present. Large air 
component at anterior aspect of the bladder lumen. No definite bladder wall 
thickening. The uterus is mildly prominent in the fundus for patient's age and 
containing multiple tiny punctated calcifications, uncertain representing 
partially calcified fibroid. Recommend ultrasound for better evaluation. Moderate ascites in the pelvis. 
  
Scoliosis with multilevel spondylosis. 
  
 
IMPRESSION:  
  
1. Moderate ascites, mesenteric stranding and marked anasarca. This makes CT evaluation very limited. 
  
2. Gallbladder hydrops with small layering tiny calculi. Moderate common bile duct dilatation with questionable elongated intraluminal hyperdensity in the distal common bile duct, concerning choledocholithiasis. Regarding the patient's situation, MRCP probably not able to obtained in both quality.  Clinical correlation and ERCP could be considered. 
  
 3. Cardiomegaly and bilateral pleural effusions. 
  
4. Fullness of uterine fundus with punctated calcifications. Recommend ultrasound for possible fibroids. 
  
Thank you for this referral. 
 
CT HEAD WO CONT [10/20/20]: FINDINGS: 
  
Borderline global atrophy again noted. No ventricular dilatation. Encephalomalacia from remote infarct at posterior right frontal lobe again 
noted. There is normal gray-white matter differentiation. There is no evidence 
of acute intracranial hemorrhage,  mass effect or midline shift identified. No 
skull fracture or extra axial fluid collections identified. Visualized sinuses 
and mastoid air cells appear unremarkable.  
  
IMPRESSION: 
  
No acute intracranial abnormality. Note: If clinical concern of acute stroke, MRI with diffusion weighted images is 
recommended for better evaluation. 
  
Right frontal encephalomalacia from remote infarct. 
  
Thank you for your referral 
 
 
 
Consults:  
 Treatment Team: Attending Provider: Vira Rodríguez MD; Primary Nurse: Tatum Vaughan NP; Nurse Practitioner: Tatum Vaughan NP; Utilization Review: Opal Perry RN; Consulting Provider: RONALDO Escobedo; Care Manager: Mary Pruitt RN; Dietitian: Timbo Bose RD; Primary Nurse: Adelita Cowden, RN Time of Death: 23:00, 10/22/20 Rob Rudd PA-C 
10/22/2020 Pulmonary Critical Care Medicine [facility]

## 2020-10-23 NOTE — PROGRESS NOTES
visited with the family of Sean Morales, who is a 71 y.o.,female. The  provided the following Interventions: 
Initiated a relationship of care and support. I expressed pastoral care and compassion. Offered prayer and assurance of continued prayers on patients behalf. Plan: 
Chaplains will continue to follow and will provide pastoral care on an as needed/requested basis.  recommends bedside caregivers page  on duty if patient shows signs of acute spiritual or emotional distress. Chaplain RAISSA Foley [facility]  
[phone]

## 2020-10-26 LAB
BACTERIA SPEC CULT: NORMAL
BACTERIA SPEC CULT: NORMAL
SERVICE CMNT-IMP: NORMAL
SERVICE CMNT-IMP: NORMAL

## 2020-11-03 NOTE — PROGRESS NOTES
Physician Progress Note Nii Macias [facility] #:                  [id number] :                       1955 ADMIT DATE:       10/20/2020 8:06 AM 
DISCH DATE:        10/22/2020 11:00 PM 
RESPONDING 
PROVIDER #:        Yaritza Garner MD 
 
 
 
 
QUERY TEXT: 
 
Pt admitted with acute CHF with EF now <15%. Pt noted to have tachycardia, hypotension, VALENTINA and placed on milrinone gtt. The medical record reflects the following: 
Risk Factors: acute CHF, VALENTINA, EF now <15% with hypotension Clinical Indicators: 
10/22 PN:  10/22 Remains tachycardic. On milrinone per cards. Good urine output, Overall very poor prognosis and options for her stage of CHF are very limited. No ace or arb given renal function Treatment: Lasix, milrione gtt, Thank you. Talat Mooney RN BSN CCDS Options provided: 
-- Cardiogenic Shock -- Hypotension without Shock 
-- Other - I will add my own diagnosis -- Disagree - Not applicable / Not valid -- Disagree - Clinically unable to determine / Unknown 
-- Refer to Clinical Documentation Reviewer PROVIDER RESPONSE TEXT: 
 
This patient has Cardiogenic Shock. Query created by:  Ellie Alicia on 10/28/2020 10:17 AM 
 
 
Electronically signed by:  Yaritza Garner MD 11/3/2020 4:38 PM

## 2021-09-10 NOTE — TELEPHONE ENCOUNTER
Called patient.  Explained that protocol is to schedule procedure, after which rendering facility will run benefits and if authorization is required, they will either submit authorization to authorizing entity ([facility]) or request us to do so.  Patient said she has scheduled for 9/17/21 and she informed  of this.  Reiterated protocol and advised patient to follow-up with [facility] by 9/15 to confirm if authorization had been obtained.  Patient expressed understanding.   Last ov 9/6/2016    Pharmacy requesting 90 day supply.

## 2021-12-14 NOTE — TELEPHONE ENCOUNTER
No meds til seen.
SHIRA    Received a fax from the patient's pharmacy requesting a refill of Ultram. Patient last seen in the office on 09/28/2016 by Dr. Haile Mays and was advised to f/u in the office in 6 months. The patient no showed the March 29th appointment. Called and spoke with the patient and advised her that she must be evaluated prior to refills. Patient wished to schedule an appointment. Made an attempt to transfer the call to [facility] and the patient disconnected the call. Patient needs an appointment.
Him/He

## 2023-02-12 NOTE — CONSULTS
[facility] - Consult Note Consultation request by Vane Hughes MD for advice/opinion related to evaluating Respiratory failure with hypoxia ([address] [J96.91] Date of  Admission: 10/20/2020  8:06 AM  
Primary Care Physician:  Miguel Monk MD 
 
 Assessment:  
 
Patient Active Problem List  
Diagnosis Code  Diabetes mellitus with kidney complication ([facility]) T20.35  
 Paroxysmal atrial fibrillation ([facility]) I48.0  Congestive heart failure ([facility]) I50.9  Mental retardation F79  Chronic kidney disease, stage III (moderate) N18.30  Dilated cardiomyopathy ([facility]) I42.0  Scoliosis M41.9  Hypertension I10  
 Hypothyroidism E03.9  Iron deficiency anemia, severe requiring transfusions 1/2015 D50.0  Advance directive declined by patient Z68.5  DDD (degenerative disc disease), lumbar M51.36  
 Paroxysmal atrial fibrillation with RVR ([facility]) I48.0  CVA (cerebral vascular accident) ([address] I63.9  VALENTINA (acute kidney injury) ([facility] [address] N17.9  Fall W19. Marinus Plaster  Conjunctivitis of both eyes H10.9  Hypothyroid E03.9  Cellulitis L03.90  Labia enlarged N90.60  Elevated troponin R77.8  Acute exacerbation of CHF (congestive heart failure) ([facility]) I50.9  Acute hypoxemic respiratory failure ([facility]) J96.01  
 CHF (congestive heart failure) ([facility]) I50.9  Respiratory failure with hypoxia ([address] J96.91  
 
 
-Presented with severe hypoglycemia and seizure from nursing home. 
-Acute respiratory failure, intubated in ED for airway protection. Suspect due to acute on chronic systolic heart failure. Possible aspiration component as well. -Severe biventricular heart failure.  Echo this admission with EF <15% with globally reduced systolic function, dilated RV with severely reduced systolic function and dilated LA/RA. 
-H/o dilated CMY presumed due to hypertensive heart disease conservatively managed in setting of comorbidities. EF now <15% with four chamber dilatation (EF 25% 7/2020 with four-chamber dilatation conservatively managed in setting of comorbidities. EF 30% 2012 improved to 64% 2015. Nuclear stress 2015 without significant ischemia). -Indeterminate troponin. ECG with chronic nonspecific ST wave abnormalities. Do not suspect primary ischemic event. Not currently candidate for ischemic work up. 
-Possible aspiration pneumonia. 
-Chronic HFrEF. -Acute on chronic stage 3 kidney disease. 
-Paroxysmal atrial fibrillation which now appears persistent. On coreg and Eliquis as outpatient. 
-Hypertension. Low normal this admission. 
-Diabetes mellitus. hgbA1c 7.8. 
-Hypothyroidism. -H/o TIAs/CVA. -H/o GIB. -Intellectual disability. 
-Wheelchair bound. Remotely followed by Dr Luther Topete 2015. Recently followed by [facility] on last admissions, followed up with Aidan Hammonds NP post hospital. Plan to follow up with Dr. Berger. Plan:  
 
Trial of IV lasix as renal function and hemodynamics allow, had dose 40 IV 11AM, noted great response to IV lasix yesterday. We will start a milrinone infusion for inotropic support. Continued supportive care with vent support as needed. Continue antibiotic coverage and aspiration precautions. Agree with coverage with heparin infusion while Eliquis on hold. Continue coreg as BP allows. Digoxin if elevated HR and hypotension become an issue. No ace or arb given renal function and low BP. Would recommend addressing goals of care and code status with family. History of Present Illness: This is a 71 y.o. female admitted for Respiratory failure with hypoxia ([facility] [address] [J96.91]. Patient complains of:  Hypoglycemia and seizure. EMS was called to nursing home for severe hypoglycemia with BS 20. Patient was treated for hypoglycemia and then was noted to have tonic clonic seizure activity. Patient had episodes of apnea in ER. Patient could not protect airway. Patient was intubated. Patient is noted to have concerns of heart failure with bilateral pleural effusions and anasarca by CT. Patient has known chronic HFrEF but now with worsening LV function. Patient is being treated with IV lasix and has had good diuresis thus far. BP remains low normal and HR remains mildly elevated. Cardiac risk factors: diabetes mellitus, hypertension Review of Symptoms:  Intubated and sedated Past Medical History:  
 
Past Medical History:  
Diagnosis Date  Cardiac echocardiogram 02/09/2015 EF 64%. No WMA. Mild LVH. Gr 2 DDfx. RVSP 30 mmHg. Mod-severe LAE. No significant valvular heart disease.  Cardiac nuclear imaging test, abnormal 02/09/2015 LV apical defect likely c/w prior infarction w/sm area of distal anterior romulo-infarct ischemia. EF 57%. Apical WMA, likely from prior infarct. Nondiagnostic EKG on pharm stress test.  
 Chronic kidney disease, stage III (moderate) ([address] 2/6/2012  Chronic renal disease, stage III ([address]  Congestive heart failure ([address] 2/6/2012  Congestive heart failure, unspecified 01/2012 Echocardiogram with severe global hypokinesis and EF 30% consistent with dilated cardiomyopathy.  DDD (degenerative disc disease), lumbar 9/10/2016  Diabetes mellitus ([address] 01/2015 Presented with blood sugar of 637 and HbA1c 13.4  Diabetes mellitus with kidney complication ([address] 5/2/6677  Dilated cardiomyopathy ([address] 2/6/2012  Hypertension  Hypothyroidism  Iron deficiency anemia 01/2015 Presented with Hb 5.1/ Hct 18.0. On Xarelto. No obvious source of bleeding. Did not follow-up for outpatient GI evaluation.  Low back pain 01/21/2015 Abdominal CT scan with thoracolumbar rotoscoliosis with severe multilevel degenerative disc disease and facet arthropathy; central canal stenosis at L4-L5.  Mental retardation 2/6/2012  Paroxysmal atrial fibrillation ([address] 01/2012 Presented with atrial fibrillation with RVR; converted spontaneously to sinus rhythm during hospitalization and subsequently started on amiodarone and Xarelto. Social History:  
 
Social History Socioeconomic History  Marital status: SINGLE Spouse name: Not on file  Number of children: Not on file  Years of education: Not on file  Highest education level: Not on file Tobacco Use  Smoking status: Never Smoker  Smokeless tobacco: Never Used Substance and Sexual Activity  Alcohol use: No  
 Drug use: No  
 Sexual activity: Never Birth control/protection: None Family History: No family history on file. Medications:  
No Known Allergies Current Facility-Administered Medications Medication Dose Route Frequency  midazolam in normal saline (VERSED) 1 mg/mL infusion  0-10 mg/hr IntraVENous TITRATE  fentaNYL (PF) 900 mcg/30 ml infusion soln  0-200 mcg/hr IntraVENous TITRATE  midazolam (VERSED) injection 1-2 mg  1-2 mg IntraVENous Q30MIN PRN  
 fentaNYL citrate (PF) injection  mcg   mcg IntraVENous Q30MIN PRN  
 heparin (porcine) injection 5,000 Units  5,000 Units SubCUTAneous Q12H  
 sodium chloride (NS) flush 5-10 mL  5-10 mL IntraVENous PRN  
 sodium chloride (NS) flush 5-40 mL  5-40 mL IntraVENous Q8H  
 sodium chloride (NS) flush 5-40 mL  5-40 mL IntraVENous PRN  
 acetaminophen (TYLENOL) tablet 650 mg  650 mg Oral Q6H PRN Or  
 acetaminophen (TYLENOL) suppository 650 mg  650 mg Rectal Q6H PRN  polyethylene glycol (MIRALAX) packet 17 g  17 g Oral DAILY PRN  promethazine (PHENERGAN) tablet 12.5 mg  12.5 mg Oral Q6H PRN Or  
 ondansetron (ZOFRAN) injection 4 mg  4 mg IntraVENous Q6H PRN  chlorhexidine (PERIDEX) 0.12 % mouthwash 10 mL  10 mL Oral Q12H  levothyroxine (SYNTHROID) tablet 100 mcg  100 mcg Oral 6am  
 carvediloL (COREG) tablet 6.25 mg  6.25 mg Oral BID  
  atorvastatin (LIPITOR) tablet 80 mg  80 mg Oral QHS  pantoprazole (PROTONIX) 40 mg in 0.9% sodium chloride 10 mL injection  40 mg IntraVENous DAILY  insulin glargine (LANTUS) injection 4 Units  4 Units SubCUTAneous QHS  dextrose 10% infusion  10 mL/hr IntraVENous CONTINUOUS Current Outpatient Medications Medication Sig  
 acetaminophen (TYLENOL) 325 mg tablet Take 2 Tabs by mouth every six (6) hours as needed for Pain or Fever.  polyethylene glycol (MIRALAX) 17 gram packet Take 1 Packet by mouth daily as needed for Constipation.  insulin lispro (HUMALOG) 100 unit/mL injection Check FSBS AC*HS For sugars between 160 and 200- Give 2 units SQ, For sugars between 201 and 250, Give 3 units SQ, For sugars Between 251 and 300, give 5 units SQ, For Sugars between 301 and 350, give 7 units SQ For sugars between 351 and 400, give 8 units SQ and contact PCP  insulin glargine (Lantus U-100 Insulin) 100 unit/mL injection 4 Units by SubCUTAneous route nightly. Hold if fingerstick blood sugar is less than 140  furosemide (Lasix) 20 mg tablet Take 1 Tab by mouth daily. Start from Sunday, August 30, 2020  senna-docusate (PERICOLACE) 8.6-50 mg per tablet Take 2 Tabs by mouth nightly.  ferrous sulfate 325 mg (65 mg iron) tablet Take 1 Tab by mouth daily (with breakfast).  atorvastatin (LIPITOR) 80 mg tablet Take 1 Tab by mouth nightly.  levothyroxine (SYNTHROID) 100 mcg tablet Take 1 Tab by mouth every morning.  pantoprazole (PROTONIX) 40 mg tablet Take 1 tablet by mouth daily.  carvedilol (COREG) 6.25 mg tablet Take 1 tablet by mouth two (2) times a day. Physical Exam:  
 
Visit Vitals /68 Pulse (!) 132 Temp 99 °F (37.2 °C) Resp 13 Ht 5' 5\" (1.651 m) Wt 68 kg (150 lb) SpO2 100% BMI 24.96 kg/m² BP Readings from Last 3 Encounters:  
10/21/20 108/68  
10/07/20 121/79  
09/17/20 130/85 Pulse Readings from Last 3 Encounters:  
10/21/20 (!) 132 10/07/20 93  
09/17/20 92 Wt Readings from Last 3 Encounters:  
10/21/20 68 kg (150 lb) 08/29/20 64.2 kg (141 lb 9.6 oz)  
07/20/20 59.3 kg (130 lb 11.7 oz) General:  no distress Lungs:  diminished breath sounds Heart:  regular rate and rhythm Abdomen:  abdomen is soft obese Extremities:  extremities normal, atraumatic, no cyanosis 2+ edema Skin: Warm and dry. no hyperpigmentation, vitiligo, or suspicious lesions Neuro: Intubated and sedated Psych: non focal 
 
 Data Review:  
 
Recent Labs 10/21/20 
0440 10/20/20 
2533 WBC 9.0 8.5 HGB 13.3 13.9 HCT 40.0 43.5  216 Recent Labs 10/21/20 
1030 10/21/20 
0440 10/20/20 
1336 10/20/20 
4756  146* 148* 145  
K 3.5 3.2* 3.3* 3.6  107 109 105 CO2 28 31 34* 30  
* 169* 41* 267* BUN 46* 53* 53* 59* CREA 1.63* 1.76* 1.60* 1.94* CA 7.2* 8.5 8.8 9.0 MG  --  1.9 1.9  --   
PHOS  --  3.7 3.6  --   
ALB  --  2.1*  --  2.8* ALT  --  149*  --  208* Results for orders placed or performed during the hospital encounter of 10/20/20 EKG, 12 LEAD, INITIAL Result Value Ref Range Ventricular Rate 82 BPM  
 Atrial Rate 258 BPM  
 QRS Duration 104 ms Q-T Interval 438 ms QTC Calculation (Bezet) 511 ms Calculated R Axis -20 degrees Calculated T Axis -126 degrees Diagnosis Atrial fibrillation with premature ventricular or aberrantly conducted  
complexes Incomplete right bundle branch block Anterior infarct (cited on or before 07-JUL-2020) Prolonged QT Abnormal ECG When compared with ECG of 07-OCT-2020 00:42, No significant change was found Confirmed by Tramaine Maravilla ([phone]) on 10/20/2020 10:56:02 AM 
  
Results for orders placed or performed in visit on 09/17/20 AMB POC EKG ROUTINE W/ 12 LEADS, INTER & REP Narrative Read by Sebastian Jose MD.  Atrial fibrillation. Prior anterior infarct. Non-specific ST abnormality. All Cardiac Markers in the last 24 hours: Lab Results Component Value Date/Time CPK 43 10/21/2020 04:40 AM  
 CKMB 2.7 10/21/2020 04:40 AM  
 CKND1 6.3 (H) 10/21/2020 04:40 AM  
 TROIQ 0.80 (H) 10/21/2020 04:40 AM  
 TROIQ 0.72 (H) 10/20/2020 08:50 PM  
 TROIQ 0.62 (H) 10/20/2020 01:36 PM  
 
 
Last Lipid:   
Lab Results Component Value Date/Time Cholesterol, total 145 07/08/2020 04:20 AM  
 HDL Cholesterol 56 07/08/2020 04:20 AM  
 LDL, calculated 69 07/08/2020 04:20 AM  
 Triglyceride 100 07/08/2020 04:20 AM  
 CHOL/HDL Ratio 2.6 07/08/2020 04:20 AM  
 
 
Signed By: RONALDO Pryor October 21, 2020 René Cook MD 
 13-Feb-2023

## 2025-08-03 NOTE — PROGRESS NOTES
Family Medicine Progress Note      PCP: Domingo Jerome MD    CHIEF COMPLAINT: Chest pain, atrial fibrillation    HISTORY OF PRESENT ILLNESS  75 year old female with past medical history of recent thoracic aortic dissection status postrepair on 6/20/2025, nonischemic cardiomyopathy, hypertension, pulmonary embolus, atrial fibrillation presents with fatigue, chest pain, shortness of breath for the last several days.  Per chart review patient has had persistent atrial fibrillation after postsurgical recovery and was started on amiodarone several days ago.  Patient reports chest pain is pleuritic chest pain on the anterior chest around the incision.    8/3: Reports feeling okay this AM. No acute events overnight. Still having anterior chest wall pain. Echo with cardiomyopathy.     History obtained from chart review and the patient      MEDICATIONS  Prior to Admission medications    Medication Sig Start Date End Date Taking? Authorizing Provider   bumetanide (BUMEX) 1 MG tablet Take 1 tablet by mouth 2 times daily. 8/1/25 9/30/25 Yes Domingo Jerome MD   AMIODarone (PACERONE) 200 MG tablet Take 1 tablet by mouth in the morning and 1 tablet in the evening. 7/30/25  Yes Joao Cleary MD   warfarin (COUMADIN) 1 MG tablet (warfarin) Take 3 tablets by mouth every evening. 7/10/25  Yes Mack Kamara MD   dilTIAZem (Cardizem CD) 120 MG 24 hr capsule Take 1 capsule by mouth daily. 7/9/25 8/8/25 Yes Mack Kamara MD   gabapentin (NEURONTIN) 400 MG capsule Take 1 capsule by mouth every 8 hours. 7/9/25 8/8/25 Yes Mack Kamara MD   lidocaine (LIDOCARE) 4 % patch Place 1 patch onto the skin daily. 7/10/25  Yes Mack Kamara MD   polyethylene glycol (MIRALAX) 17 g packet Take 17 g by mouth daily. Stir and dissolve powder in any 4 to 8 ounces of beverage, then drink.  Patient taking differently: Take 17 g by mouth daily as needed. Indications: Constipation Stir and dissolve powder in any 4 to 8 ounces of  Palliative Medicine Palliative Medicine team Blaise Trevino NP and Danyell Azar RN met at pt's bedside. Pt was alert and oriented x 2 (person, place). She thought the year was 2009. Pt has an intellectual disability and lives at [address] [place] [address]. Pt stated, \"I just don't feel good. I don't want to be at the hospital.  I'd rather be home\". Pt was very perseverative on the times that her lunch and dinner were going to arrive. She denied any pain or SOB. Also asked multiple times when I would be back to visit her. Reassured pt that staff would be checking on her hourly. Telephone call made to pt's legal NOK listed in the chart Jose Guadalupe Bryant (sister) - no answer; VM left asking for return call. Pt remains FULL code with FULL aggressive medical management. Thank you for the Palliative Medicine consult and allowing us to participate in the care of Ms. Janett Mejia. Will continue to monitor and provide support. TAMIKO Reid Palliative Medicine Inpatient RN DR. M[facility]FIELD[facility] Palliative [facility] Line: [phone] ([phone]) beverage, then drink. 7/9/25  Yes Mack Kamara MD   cyanocobalamin 1000 MCG tablet Take 1 tablet by mouth daily. 6/25/25  Yes Ulises Gonzalez PA-C   aspirin (ECOTRIN) 81 MG EC tablet Take 1 tablet by mouth daily. 6/23/25  Yes Claudia Kearney NP   Calcium-Phosphorus-Vitamin D (CALCIUM GUMMIES PO) Take 1,000 mg by mouth daily.   Yes Provider, Outside   traMADol (ULTRAM) 50 MG tablet TAKE 1 TABLET BY MOUTH TWICE A DAY IN THE MORNING AND IN THE EVENING 5/23/25  Yes Devante Woodard DO   DULoxetine (CYMBALTA) 60 MG capsule TAKE 1 CAPSULE BY MOUTH IN THE MORNING AND IN THE EVENING 5/15/25  Yes Domingo Jerome MD   alendronate (FOSAMAX) 70 MG tablet Take 70 mg by mouth every 7 days.   Yes Provider, Outside   Ferrous Sulfate (Iron) 325 (65 Fe) MG Tab Take 325 mg by mouth daily.   Yes Provider, Outside   acetaminophen (TYLENOL) 500 MG tablet Take 2 tablets by mouth every 8 hours.  Patient taking differently: Take 1,000 mg by mouth every 8 hours as needed. Indications: Pain, for mild pain 10/11/24  Yes Beryl Faulkner APNP   Cholecalciferol (Vitamin D3) 1000 units Cap Take 5,000 Units by mouth daily.   Yes Provider, Outside   zoledronic acid (RECLAST) 5 MG/100ML injectable solution Inject into the vein 1 time. Patient gets infusions annually.    Provider, Outside   fluticasone (FLONASE) 50 MCG/ACT nasal spray Spray 1 spray in each nostril daily.  Patient taking differently: Spray 1 spray in each nostril daily as needed. 5/25/23   Madelyn Galvan MD   magnesium hydroxide (Milk of Magnesia) 400 MG/5ML suspension Take 10-15 mLs by mouth daily as needed for Constipation. 12/20/21   Domingo Jerome MD         PHYSICAL EXAMINATION  Vital Signs: Visit Vitals  /89   Pulse (!) 117   Temp 97.7 °F (36.5 °C) (Oral)   Resp 18   Ht 5' 10\" (1.778 m)   Wt 98.4 kg (217 lb)   LMP  (LMP Unknown)   SpO2 96%   BMI 31.14 kg/m²    on room air    General: alert, no acute distress  HEENT: normocephalic, atraumatic  Heart: RRR no murmurs,  rubs, gallops. Radial pulses 2+ b/l  Lungs: CTAB no wheezes, rales, rhonchi. Normal respiratory effort.  Abdominal: abdomen soft, nontender, nondistended  Neurological: CN 2-12 grossly intact bilaterally, no focal deficits, speech normal  MSK: muscle strength grossly 5/5 in upper and lower extremities bilaterally  Skin: no rashes or lesions noted  Psych: normal affect      LABORATORY  Labs Reviewed   COMPREHENSIVE METABOLIC PANEL - Abnormal; Notable for the following components:       Result Value    Glucose 116 (*)     Albumin 3.0 (*)     A/G Ratio 0.8 (*)     All other components within normal limits   PROTHROMBIN TIME (INR/PT) - Abnormal; Notable for the following components:    Protime- PT 36.5 (*)     All other components within normal limits   PARTIAL THROMBOPLASTIN TIME (PTT) - Abnormal; Notable for the following components:    PTT 58 (*)     All other components within normal limits    Narrative:     PTT  Therapeutic Range:  45-65 seconds.       NT PROBNP - Abnormal; Notable for the following components:    NT-proBNP 2,418 (*)     All other components within normal limits   CBC WITH AUTOMATED DIFFERENTIAL (PERFORMABLE ONLY) - Abnormal; Notable for the following components:    WBC 13.2 (*)     HGB 11.3 (*)     MCHC 31.4 (*)     Absolute Neutrophils 9.7 (*)     Absolute Monocytes 1.2 (*)     All other components within normal limits    Narrative:     This is an appended report.  These results have been appended to a previously verified report.   COMPREHENSIVE METABOLIC PANEL - Abnormal; Notable for the following components:    Glucose 107 (*)     Albumin 2.7 (*)     A/G Ratio 0.7 (*)     All other components within normal limits   CBC WITH AUTOMATED DIFFERENTIAL (PERFORMABLE ONLY) - Abnormal; Notable for the following components:    RBC 3.91 (*)     HGB 10.7 (*)     HCT 34.3 (*)     MCHC 31.2 (*)     Absolute Monocytes 1.0 (*)     All other components within normal limits   PROTHROMBIN TIME (INR/PT) -  Abnormal; Notable for the following components:    Protime- PT 35.5 (*)     All other components within normal limits   COMPREHENSIVE METABOLIC PANEL - Abnormal; Notable for the following components:    Glucose 101 (*)     Albumin 2.7 (*)     A/G Ratio 0.7 (*)     All other components within normal limits   PROTHROMBIN TIME (INR/PT) - Abnormal; Notable for the following components:    Protime- PT 32.6 (*)     All other components within normal limits   CBC WITH AUTOMATED DIFFERENTIAL (PERFORMABLE ONLY) - Abnormal; Notable for the following components:    HGB 11.3 (*)     MCHC 30.5 (*)     All other components within normal limits   TROPONIN I, HIGH SENSITIVITY - Normal   MAGNESIUM - Normal   POTASSIUM - Normal   THYROID STIMULATING HORMONE REFLEX - Normal   TROPONIN I, HIGH SENSITIVITY - Normal   CBC WITH DIFFERENTIAL    Narrative:     The following orders were created for panel order CBC with Automated Differential.  Procedure                               Abnormality         Status                     ---------                               -----------         ------                     CBC with Automated Dif...[[id number]]  Abnormal            Final result                 Please view results for these tests on the individual orders.   COVID/FLU/RSV PANEL   CBC WITH DIFFERENTIAL    Narrative:     The following orders were created for panel order CBC with Automated Differential.  Procedure                               Abnormality         Status                     ---------                               -----------         ------                     CBC with Automated Dif...[[id number]]  Abnormal            Final result                 Please view results for these tests on the individual orders.   EXTRA TUBES    Narrative:     The following orders were created for panel order Extra Tubes.  Procedure                               Abnormality         Status                     ---------                                -----------         ------                     Gold Top[[id number]]                                       Final result                 Please view results for these tests on the individual orders.   GOLD TOP   CBC WITH DIFFERENTIAL    Narrative:     The following orders were created for panel order CBC with Automated Differential.  Procedure                               Abnormality         Status                     ---------                               -----------         ------                     CBC with Automated Dif...[[id number]]  Abnormal            Final result                 Please view results for these tests on the individual orders.   GOLD TOP         IMAGING  CTA CHEST ABDOMEN PELVIS   Final Result   IMPRESSION:   1.  Relatively recent interval surgical repair of ascending thoracic aorta.   Small volume surrounding circumferential strandy high density surrounding   the repair likely reflects some postoperative minimal hematoma, edema, or   granulation, but no evidence for active bleeding/extravasation otherwise.   2.  No other/new acute aortic pathology otherwise.   3.  Some degree of callus/healing involving a nondisplaced right lateral   second rib fracture, potentially accounting for described pain (apparently   new from recent preoperative chest CT).   4.  Massive hiatus hernia with essentially completely intrathoracic stomach   and, partial/early organoaxial positioning. No evidence for obstructive   volvulus.   5.  Cholelithiasis without CT findings of acute cholecystitis.      Electronically Signed by: Luke A Falesch, MD   Signed on: 8/1/2025 10:19 PM   Created on Workstation ID: [id number]   Signed on Workstation ID: [id number]      CT HEAD WO CONTRAST   Final Result   IMPRESSION:      No acute intracranial abnormality.      Electronically Signed by: Luke A Falesch, MD   Signed on: 8/1/2025 9:46 PM   Created on Workstation ID: [id number]   Signed on Workstation ID: [id number]      XR CHEST  AP OR PA   Final Result   IMPRESSION:      No new/acute cardiopulmonary findings.            Electronically Signed by: Luke A Falesch, MD   Signed on: 8/1/2025 8:54 PM   Created on Workstation ID: [id number]   Signed on Workstation ID: [id number]            STUDIES  EKG with afib w RVR    ASSESSMENT & PLAN  Stacy Simpson is a 75 year old female being admitted for:    # Atrial fibrillation with RVR  # Hypertension  Improving this morning status post digoxin bolus  - Cardiology consulted  - Discontinue amiodarone  - Metoprolol started  - Continue diltiazem  - Warfarin per pharmacy    # Cardiomyopathy  LVEF 31% yesterday compared to prior EF 55% on 12/30/24.  - Jardiance  - Entresto  - Stress test tomorrow    # Chest pain  # Right lateral 2nd rib fracture   - Lidocaine patch  - Flexeril prn  - Scheduled tylenol     # Aortic dissection s/p repair  Appears intact on CT     # Anemia  Chronic, stable    # Chronic Conditions  - Chronic pain: home duloxetine, gabapentin     # FEN: cardiac    # Prophylaxis:   VTE Risk Assessment is moderate  - Coumadin with INR goal 2-3      # Disposition: Admit to inpatient as anticipate this patient will require 2 midnights of medically necessary care for atrial fibrillation with RVR    Verónica Rosales DO   8/3/2025 9:09 AM